# Patient Record
Sex: FEMALE | Race: WHITE | NOT HISPANIC OR LATINO | URBAN - NONMETROPOLITAN AREA
[De-identification: names, ages, dates, MRNs, and addresses within clinical notes are randomized per-mention and may not be internally consistent; named-entity substitution may affect disease eponyms.]

---

## 2017-10-09 ENCOUNTER — HOSPITAL ENCOUNTER (OUTPATIENT)
Dept: RADIOLOGY | Facility: HOSPITAL | Age: 62
Discharge: HOME | End: 2017-10-09
Payer: COMMERCIAL

## 2017-10-09 ENCOUNTER — TRANSCRIBE ORDERS (OUTPATIENT)
Dept: ADMINISTRATIVE | Facility: HOSPITAL | Age: 62
End: 2017-10-09

## 2017-10-09 DIAGNOSIS — M19.90 OSTEOARTHRITIS, UNSPECIFIED OSTEOARTHRITIS TYPE, UNSPECIFIED SITE: Primary | ICD-10-CM

## 2017-10-09 DIAGNOSIS — M19.90 OSTEOARTHRITIS, UNSPECIFIED OSTEOARTHRITIS TYPE, UNSPECIFIED SITE: ICD-10-CM

## 2017-10-09 PROCEDURE — 73630 X-RAY EXAM OF FOOT: CPT

## 2017-10-09 PROCEDURE — 73610 X-RAY EXAM OF ANKLE: CPT

## 2017-10-09 PROCEDURE — 73502 X-RAY EXAM HIP UNI 2-3 VIEWS: CPT

## 2017-12-12 ENCOUNTER — HOSPITAL ENCOUNTER (OUTPATIENT)
Dept: MAMMOGRAPHY | Facility: HOSPITAL | Age: 62
Discharge: HOME | End: 2017-12-12
Payer: COMMERCIAL

## 2017-12-12 ENCOUNTER — GENERIC CONVERSION - ENCOUNTER (OUTPATIENT)
Dept: OTHER | Facility: OTHER | Age: 62
End: 2017-12-12

## 2017-12-12 DIAGNOSIS — Z12.31 OTHER SCREENING MAMMOGRAM: ICD-10-CM

## 2017-12-12 PROCEDURE — G0202 SCR MAMMO BI INCL CAD: HCPCS

## 2017-12-12 PROCEDURE — 77063 BREAST TOMOSYNTHESIS BI: CPT

## 2018-09-11 ENCOUNTER — HOSPITAL ENCOUNTER (OUTPATIENT)
Dept: RADIOLOGY | Facility: HOSPITAL | Age: 63
Discharge: HOME | End: 2018-09-11
Payer: COMMERCIAL

## 2018-09-11 ENCOUNTER — TRANSCRIBE ORDERS (OUTPATIENT)
Dept: ADMINISTRATIVE | Facility: HOSPITAL | Age: 63
End: 2018-09-11

## 2018-09-11 DIAGNOSIS — G89.11 ACUTE TRAUMATIC PAIN: Primary | ICD-10-CM

## 2018-09-11 DIAGNOSIS — G89.11 ACUTE TRAUMATIC PAIN: ICD-10-CM

## 2018-09-11 PROCEDURE — 73630 X-RAY EXAM OF FOOT: CPT

## 2018-11-19 ENCOUNTER — TRANSCRIBE ORDERS (OUTPATIENT)
Dept: ADMINISTRATIVE | Facility: HOSPITAL | Age: 63
End: 2018-11-19

## 2018-11-19 DIAGNOSIS — Z12.39 SCREENING BREAST EXAMINATION: Primary | ICD-10-CM

## 2018-12-13 ENCOUNTER — HOSPITAL ENCOUNTER (OUTPATIENT)
Dept: MAMMOGRAPHY | Facility: HOSPITAL | Age: 63
Discharge: HOME | End: 2018-12-13
Payer: COMMERCIAL

## 2018-12-13 VITALS — HEIGHT: 65 IN | WEIGHT: 280 LBS | BODY MASS INDEX: 46.65 KG/M2

## 2018-12-13 DIAGNOSIS — Z12.39 SCREENING BREAST EXAMINATION: ICD-10-CM

## 2018-12-13 PROCEDURE — 77063 BREAST TOMOSYNTHESIS BI: CPT

## 2018-12-13 PROCEDURE — 77067 SCR MAMMO BI INCL CAD: CPT

## 2019-11-21 ENCOUNTER — TRANSCRIBE ORDERS (OUTPATIENT)
Dept: ADMINISTRATIVE | Facility: HOSPITAL | Age: 64
End: 2019-11-21

## 2019-11-21 DIAGNOSIS — Z12.31 ENCOUNTER FOR SCREENING MAMMOGRAM FOR MALIGNANT NEOPLASM OF BREAST: Primary | ICD-10-CM

## 2019-12-23 ENCOUNTER — HOSPITAL ENCOUNTER (OUTPATIENT)
Dept: MAMMOGRAPHY | Facility: HOSPITAL | Age: 64
Discharge: HOME | End: 2019-12-23
Payer: COMMERCIAL

## 2019-12-23 VITALS — BODY MASS INDEX: 46.65 KG/M2 | HEIGHT: 65 IN | WEIGHT: 280 LBS

## 2019-12-23 DIAGNOSIS — Z12.31 ENCOUNTER FOR SCREENING MAMMOGRAM FOR MALIGNANT NEOPLASM OF BREAST: ICD-10-CM

## 2019-12-23 PROCEDURE — 77063 BREAST TOMOSYNTHESIS BI: CPT

## 2019-12-23 PROCEDURE — 77067 SCR MAMMO BI INCL CAD: CPT

## 2020-07-11 DIAGNOSIS — E55.9 VITAMIN D DEFICIENCY: Primary | ICD-10-CM

## 2020-07-13 RX ORDER — ERGOCALCIFEROL 1.25 MG/1
CAPSULE ORAL
Qty: 12 CAPSULE | Refills: 0 | Status: SHIPPED | OUTPATIENT
Start: 2020-07-13 | End: 2020-09-17 | Stop reason: SDUPTHER

## 2020-09-09 LAB — HBA1C MFR BLD HPLC: 7.8 %

## 2020-09-09 PROCEDURE — 3051F HG A1C>EQUAL 7.0%<8.0%: CPT | Performed by: FAMILY MEDICINE

## 2020-09-17 ENCOUNTER — OFFICE VISIT (OUTPATIENT)
Dept: FAMILY MEDICINE CLINIC | Facility: CLINIC | Age: 65
End: 2020-09-17
Payer: COMMERCIAL

## 2020-09-17 VITALS
HEART RATE: 68 BPM | RESPIRATION RATE: 20 BRPM | HEIGHT: 64 IN | BODY MASS INDEX: 44.22 KG/M2 | DIASTOLIC BLOOD PRESSURE: 84 MMHG | SYSTOLIC BLOOD PRESSURE: 136 MMHG | WEIGHT: 259 LBS

## 2020-09-17 DIAGNOSIS — E78.5 DYSLIPIDEMIA: ICD-10-CM

## 2020-09-17 DIAGNOSIS — E03.9 ACQUIRED HYPOTHYROIDISM: ICD-10-CM

## 2020-09-17 DIAGNOSIS — M19.90 ARTHRITIS: ICD-10-CM

## 2020-09-17 DIAGNOSIS — E66.9 DIABETES MELLITUS TYPE 2 IN OBESE (HCC): ICD-10-CM

## 2020-09-17 DIAGNOSIS — E55.9 VITAMIN D DEFICIENCY: ICD-10-CM

## 2020-09-17 DIAGNOSIS — I10 ESSENTIAL HYPERTENSION: Primary | ICD-10-CM

## 2020-09-17 DIAGNOSIS — E11.69 DIABETES MELLITUS TYPE 2 IN OBESE (HCC): ICD-10-CM

## 2020-09-17 PROCEDURE — 99214 OFFICE O/P EST MOD 30 MIN: CPT | Performed by: FAMILY MEDICINE

## 2020-09-17 PROCEDURE — 1036F TOBACCO NON-USER: CPT | Performed by: FAMILY MEDICINE

## 2020-09-17 PROCEDURE — 3725F SCREEN DEPRESSION PERFORMED: CPT | Performed by: FAMILY MEDICINE

## 2020-09-17 PROCEDURE — 3079F DIAST BP 80-89 MM HG: CPT | Performed by: FAMILY MEDICINE

## 2020-09-17 PROCEDURE — 3075F SYST BP GE 130 - 139MM HG: CPT | Performed by: FAMILY MEDICINE

## 2020-09-17 RX ORDER — EZETIMIBE 10 MG/1
10 TABLET ORAL DAILY
COMMUNITY
Start: 2020-08-01 | End: 2020-09-17 | Stop reason: SDUPTHER

## 2020-09-17 RX ORDER — HYDROCHLOROTHIAZIDE 25 MG/1
25 TABLET ORAL DAILY
Qty: 90 TABLET | Refills: 1 | Status: SHIPPED | OUTPATIENT
Start: 2020-09-17 | End: 2021-06-29 | Stop reason: SDUPTHER

## 2020-09-17 RX ORDER — HYDROCHLOROTHIAZIDE 25 MG/1
25 TABLET ORAL DAILY
COMMUNITY
Start: 2020-07-27 | End: 2020-09-17 | Stop reason: SDUPTHER

## 2020-09-17 RX ORDER — AMLODIPINE BESYLATE 5 MG/1
5 TABLET ORAL DAILY
COMMUNITY
Start: 2020-07-17 | End: 2020-09-17 | Stop reason: SDUPTHER

## 2020-09-17 RX ORDER — LEVOTHYROXINE SODIUM 0.12 MG/1
125 TABLET ORAL DAILY
Qty: 90 TABLET | Refills: 1 | Status: SHIPPED | OUTPATIENT
Start: 2020-09-17 | End: 2021-03-29 | Stop reason: SDUPTHER

## 2020-09-17 RX ORDER — LEVOTHYROXINE SODIUM 0.12 MG/1
125 TABLET ORAL DAILY
COMMUNITY
Start: 2020-08-23 | End: 2020-09-17 | Stop reason: SDUPTHER

## 2020-09-17 RX ORDER — CARVEDILOL 6.25 MG/1
6.25 TABLET ORAL 2 TIMES DAILY
Qty: 180 TABLET | Refills: 1 | Status: SHIPPED | OUTPATIENT
Start: 2020-09-17 | End: 2021-03-29 | Stop reason: SDUPTHER

## 2020-09-17 RX ORDER — AMLODIPINE BESYLATE 5 MG/1
5 TABLET ORAL DAILY
Qty: 90 TABLET | Refills: 1 | Status: SHIPPED | OUTPATIENT
Start: 2020-09-17 | End: 2021-03-19

## 2020-09-17 RX ORDER — CARVEDILOL 6.25 MG/1
6.25 TABLET ORAL 2 TIMES DAILY
COMMUNITY
Start: 2020-08-01 | End: 2020-09-17 | Stop reason: SDUPTHER

## 2020-09-17 RX ORDER — EZETIMIBE 10 MG/1
10 TABLET ORAL DAILY
Qty: 90 TABLET | Refills: 1 | Status: SHIPPED | OUTPATIENT
Start: 2020-09-17 | End: 2021-03-29 | Stop reason: SDUPTHER

## 2020-09-17 RX ORDER — ERGOCALCIFEROL 1.25 MG/1
50000 CAPSULE ORAL WEEKLY
Qty: 12 CAPSULE | Refills: 2 | Status: SHIPPED | OUTPATIENT
Start: 2020-09-17

## 2020-09-17 NOTE — PROGRESS NOTES
Assessment/Plan:  Diabetes mellitus type 2 with fair control continue current therapy  Left carotid bruit plan carotid Doppler  Morbid obesity with hypertension and diabetes discussed diet and exercise  Hypothyroidism euthyroid on replacement    Problem List Items Addressed This Visit     None      Visit Diagnoses     Essential hypertension    -  Primary    Relevant Medications    amLODIPine (NORVASC) 5 mg tablet    carvedilol (COREG) 6 25 mg tablet    hydrochlorothiazide (HYDRODIURIL) 25 mg tablet    Other Relevant Orders    ECG with rhythm strip    CBC and differential    Comprehensive metabolic panel    Vitamin D deficiency        Relevant Medications    ergocalciferol (VITAMIN D2) 50,000 units    Dyslipidemia        Relevant Medications    ezetimibe (ZETIA) 10 mg tablet    Other Relevant Orders    Lipid Panel with Direct LDL reflex    Acquired hypothyroidism        Relevant Medications    carvedilol (COREG) 6 25 mg tablet    levothyroxine 125 mcg tablet    Diabetes mellitus type 2 in obese (HCC)        Relevant Medications    metFORMIN (GLUCOPHAGE) 500 mg tablet    Other Relevant Orders    Microalbumin / creatinine urine ratio    VAS carotid complete study    Arthritis        Relevant Orders    C-reactive protein           Diagnoses and all orders for this visit:    Essential hypertension  -     amLODIPine (NORVASC) 5 mg tablet; Take 1 tablet (5 mg total) by mouth daily  -     carvedilol (COREG) 6 25 mg tablet; Take 1 tablet (6 25 mg total) by mouth 2 (two) times a day  -     hydrochlorothiazide (HYDRODIURIL) 25 mg tablet; Take 1 tablet (25 mg total) by mouth daily  -     ECG with rhythm strip; Future  -     CBC and differential; Future  -     Comprehensive metabolic panel; Future    Vitamin D deficiency  -     ergocalciferol (VITAMIN D2) 50,000 units; Take 1 capsule (50,000 Units total) by mouth once a week    Dyslipidemia  -     ezetimibe (ZETIA) 10 mg tablet;  Take 1 tablet (10 mg total) by mouth daily  -     Lipid Panel with Direct LDL reflex; Future    Acquired hypothyroidism  -     levothyroxine 125 mcg tablet; Take 1 tablet (125 mcg total) by mouth daily    Diabetes mellitus type 2 in obese (HCC)  -     metFORMIN (GLUCOPHAGE) 500 mg tablet; Take 1 tablet (500 mg total) by mouth 2 (two) times a day  -     Microalbumin / creatinine urine ratio  -     VAS carotid complete study; Future    Arthritis  -     C-reactive protein; Future    Other orders  -     Discontinue: amLODIPine (NORVASC) 5 mg tablet; Take 5 mg by mouth daily  -     Discontinue: carvedilol (COREG) 6 25 mg tablet; Take 6 25 mg by mouth 2 (two) times a day  -     Discontinue: ezetimibe (ZETIA) 10 mg tablet; Take 10 mg by mouth daily  -     Discontinue: hydrochlorothiazide (HYDRODIURIL) 25 mg tablet; Take 25 mg by mouth daily  -     Discontinue: levothyroxine 125 mcg tablet; Take 125 mcg by mouth daily  -     Discontinue: metFORMIN (GLUCOPHAGE) 500 mg tablet; Take 500 mg by mouth 2 (two) times a day        No problem-specific Assessment & Plan notes found for this encounter  PHQ-9 Depression Screening    PHQ-9:    Frequency of the following problems over the past two weeks:       Little interest or pleasure in doing things:  0 - not at all  Feeling down, depressed, or hopeless:  0 - not at all  PHQ-2 Score:  0          Body mass index is 44 46 kg/m²  BMI Counseling: Body mass index is 44 46 kg/m²  The BMI is above normal  Nutrition recommendations include reducing portion sizes, decreasing overall calorie intake, 3-5 servings of fruits/vegetables daily, reducing fast food intake, consuming healthier snacks, decreasing soda and/or juice intake, moderation in carbohydrate intake, increasing intake of lean protein, reducing intake of saturated fat and trans fat and reducing intake of cholesterol  Subjective:      Patient ID: Sinan Ceja is a 60 y o  female      Presents for routine checkup      The following portions of the patient's history were reviewed and updated as appropriate:   She has no past medical history on file  ,  does not have a problem list on file  ,   has a past surgical history that includes Breast excisional biopsy (Left, 2001)  ,  family history includes Breast cancer in her maternal aunt, maternal aunt, and mother; Cancer in her paternal aunt, paternal aunt, paternal aunt, paternal grandfather, and paternal grandmother; Lung cancer in her paternal aunt; No Known Problems in her daughter, sister, sister, sister, sister, and sister  ,   reports that she has never smoked  She has never used smokeless tobacco  She reports previous alcohol use  She reports that she does not use drugs  ,  is allergic to aspirin and nsaids     Current Outpatient Medications   Medication Sig Dispense Refill    amLODIPine (NORVASC) 5 mg tablet Take 1 tablet (5 mg total) by mouth daily 90 tablet 1    carvedilol (COREG) 6 25 mg tablet Take 1 tablet (6 25 mg total) by mouth 2 (two) times a day 180 tablet 1    ergocalciferol (VITAMIN D2) 50,000 units Take 1 capsule (50,000 Units total) by mouth once a week 12 capsule 2    ezetimibe (ZETIA) 10 mg tablet Take 1 tablet (10 mg total) by mouth daily 90 tablet 1    hydrochlorothiazide (HYDRODIURIL) 25 mg tablet Take 1 tablet (25 mg total) by mouth daily 90 tablet 1    levothyroxine 125 mcg tablet Take 1 tablet (125 mcg total) by mouth daily 90 tablet 1    metFORMIN (GLUCOPHAGE) 500 mg tablet Take 1 tablet (500 mg total) by mouth 2 (two) times a day 180 tablet 1     No current facility-administered medications for this visit  Review of Systems   Constitutional: Negative  HENT: Negative  Eyes: Negative  Respiratory: Negative  Cardiovascular: Negative  Gastrointestinal: Negative  Endocrine: Negative  Genitourinary: Negative  Musculoskeletal: Positive for arthralgias  Skin: Negative  Allergic/Immunologic: Negative  Neurological: Negative      Hematological: Negative  Psychiatric/Behavioral: Negative  Objective:    /84   Pulse 68   Resp 20   Ht 5' 4" (1 626 m)   Wt 117 kg (259 lb)   BMI 44 46 kg/m²   Body mass index is 44 46 kg/m²  Physical Exam  Constitutional:       Appearance: She is well-developed  She is obese  HENT:      Head: Normocephalic  Eyes:      Pupils: Pupils are equal, round, and reactive to light  Neck:      Musculoskeletal: Normal range of motion  Vascular: Carotid bruit present  Comments: Left carotid bruit  Cardiovascular:      Rate and Rhythm: Normal rate and regular rhythm  Heart sounds: Normal heart sounds  Pulmonary:      Effort: Pulmonary effort is normal       Breath sounds: Normal breath sounds  Abdominal:      General: Bowel sounds are normal       Palpations: Abdomen is soft  Tenderness: There is no abdominal tenderness  Skin:     General: Skin is warm  Neurological:      Mental Status: She is alert and oriented to person, place, and time

## 2020-12-01 ENCOUNTER — TRANSCRIBE ORDERS (OUTPATIENT)
Dept: ADMINISTRATIVE | Facility: HOSPITAL | Age: 65
End: 2020-12-01

## 2020-12-01 DIAGNOSIS — Z12.31 ENCOUNTER FOR SCREENING MAMMOGRAM FOR MALIGNANT NEOPLASM OF BREAST: Primary | ICD-10-CM

## 2020-12-02 ENCOUNTER — HOSPITAL ENCOUNTER (OUTPATIENT)
Dept: NON INVASIVE DIAGNOSTICS | Facility: HOSPITAL | Age: 65
Discharge: HOME | End: 2020-12-02
Attending: FAMILY MEDICINE
Payer: COMMERCIAL

## 2020-12-02 ENCOUNTER — OFFICE VISIT (OUTPATIENT)
Dept: LAB | Facility: HOSPITAL | Age: 65
End: 2020-12-02
Attending: FAMILY MEDICINE
Payer: COMMERCIAL

## 2020-12-02 DIAGNOSIS — E66.9 DIABETES MELLITUS TYPE 2 IN OBESE (HCC): ICD-10-CM

## 2020-12-02 DIAGNOSIS — I10 ESSENTIAL HYPERTENSION: ICD-10-CM

## 2020-12-02 DIAGNOSIS — E11.69 DIABETES MELLITUS TYPE 2 IN OBESE (HCC): ICD-10-CM

## 2020-12-02 LAB
ATRIAL RATE: 62 BPM
P AXIS: 52 DEGREES
PR INTERVAL: 172 MS
QRS AXIS: -27 DEGREES
QRSD INTERVAL: 96 MS
QT INTERVAL: 438 MS
QTC INTERVAL: 444 MS
T WAVE AXIS: 4 DEGREES
VENTRICULAR RATE: 62 BPM

## 2020-12-02 PROCEDURE — 93880 EXTRACRANIAL BILAT STUDY: CPT | Performed by: SURGERY

## 2020-12-02 PROCEDURE — 93880 EXTRACRANIAL BILAT STUDY: CPT

## 2020-12-24 ENCOUNTER — HOSPITAL ENCOUNTER (OUTPATIENT)
Dept: MAMMOGRAPHY | Facility: HOSPITAL | Age: 65
Discharge: HOME | End: 2020-12-24
Payer: COMMERCIAL

## 2020-12-24 VITALS — WEIGHT: 259 LBS | BODY MASS INDEX: 44.22 KG/M2 | HEIGHT: 64 IN

## 2020-12-24 DIAGNOSIS — Z12.31 ENCOUNTER FOR SCREENING MAMMOGRAM FOR MALIGNANT NEOPLASM OF BREAST: ICD-10-CM

## 2020-12-24 PROCEDURE — 77063 BREAST TOMOSYNTHESIS BI: CPT

## 2020-12-24 PROCEDURE — 77067 SCR MAMMO BI INCL CAD: CPT

## 2020-12-28 ENCOUNTER — OFFICE VISIT (OUTPATIENT)
Dept: FAMILY MEDICINE CLINIC | Facility: CLINIC | Age: 65
End: 2020-12-28
Payer: COMMERCIAL

## 2020-12-28 VITALS
DIASTOLIC BLOOD PRESSURE: 68 MMHG | HEIGHT: 65 IN | TEMPERATURE: 97.2 F | SYSTOLIC BLOOD PRESSURE: 122 MMHG | RESPIRATION RATE: 20 BRPM | HEART RATE: 84 BPM | BODY MASS INDEX: 43.77 KG/M2

## 2020-12-28 DIAGNOSIS — I10 ESSENTIAL HYPERTENSION: ICD-10-CM

## 2020-12-28 DIAGNOSIS — E11.69 DIABETES MELLITUS TYPE 2 IN OBESE (HCC): Primary | ICD-10-CM

## 2020-12-28 DIAGNOSIS — E66.01 CLASS 3 SEVERE OBESITY DUE TO EXCESS CALORIES WITH SERIOUS COMORBIDITY AND BODY MASS INDEX (BMI) OF 40.0 TO 44.9 IN ADULT (HCC): ICD-10-CM

## 2020-12-28 DIAGNOSIS — E55.9 VITAMIN D DEFICIENCY: ICD-10-CM

## 2020-12-28 DIAGNOSIS — E66.9 DIABETES MELLITUS TYPE 2 IN OBESE (HCC): Primary | ICD-10-CM

## 2020-12-28 DIAGNOSIS — E78.5 DYSLIPIDEMIA: ICD-10-CM

## 2020-12-28 DIAGNOSIS — E03.9 ACQUIRED HYPOTHYROIDISM: ICD-10-CM

## 2020-12-28 PROCEDURE — 3074F SYST BP LT 130 MM HG: CPT | Performed by: FAMILY MEDICINE

## 2020-12-28 PROCEDURE — 99214 OFFICE O/P EST MOD 30 MIN: CPT | Performed by: FAMILY MEDICINE

## 2020-12-28 PROCEDURE — 1036F TOBACCO NON-USER: CPT | Performed by: FAMILY MEDICINE

## 2020-12-28 PROCEDURE — 3078F DIAST BP <80 MM HG: CPT | Performed by: FAMILY MEDICINE

## 2020-12-29 ENCOUNTER — TELEPHONE (OUTPATIENT)
Dept: ADMINISTRATIVE | Facility: OTHER | Age: 65
End: 2020-12-29

## 2021-03-01 ENCOUNTER — TELEPHONE (OUTPATIENT)
Dept: FAMILY MEDICINE CLINIC | Facility: CLINIC | Age: 66
End: 2021-03-01

## 2021-03-01 NOTE — TELEPHONE ENCOUNTER
Patient was told from pharmacy that she can get her covid vaccine 2 weeks after receiving her shingles vaccine - she is trying to schedule with [facility] for covid vaccine and they wanted the ok from her PCP - advised if pharmacist said ok we agree with them

## 2021-03-10 DIAGNOSIS — Z23 ENCOUNTER FOR IMMUNIZATION: ICD-10-CM

## 2021-03-10 LAB
CREAT ?TM UR-SCNC: 90 UMOL/L
HBA1C MFR BLD HPLC: 7.9 %

## 2021-03-10 PROCEDURE — 3051F HG A1C>EQUAL 7.0%<8.0%: CPT | Performed by: FAMILY MEDICINE

## 2021-03-19 DIAGNOSIS — I10 ESSENTIAL HYPERTENSION: ICD-10-CM

## 2021-03-19 RX ORDER — AMLODIPINE BESYLATE 5 MG/1
TABLET ORAL
Qty: 90 TABLET | Refills: 1 | Status: SHIPPED | OUTPATIENT
Start: 2021-03-19 | End: 2021-09-20

## 2021-03-29 ENCOUNTER — OFFICE VISIT (OUTPATIENT)
Dept: FAMILY MEDICINE CLINIC | Facility: CLINIC | Age: 66
End: 2021-03-29
Payer: COMMERCIAL

## 2021-03-29 VITALS
RESPIRATION RATE: 20 BRPM | HEART RATE: 84 BPM | DIASTOLIC BLOOD PRESSURE: 74 MMHG | SYSTOLIC BLOOD PRESSURE: 118 MMHG | TEMPERATURE: 97.9 F

## 2021-03-29 DIAGNOSIS — E03.9 ACQUIRED HYPOTHYROIDISM: ICD-10-CM

## 2021-03-29 DIAGNOSIS — L50.8 CHRONIC URTICARIA: ICD-10-CM

## 2021-03-29 DIAGNOSIS — I10 ESSENTIAL HYPERTENSION: ICD-10-CM

## 2021-03-29 DIAGNOSIS — E78.5 DYSLIPIDEMIA: ICD-10-CM

## 2021-03-29 DIAGNOSIS — E11.69 DIABETES MELLITUS TYPE 2 IN OBESE (HCC): Primary | ICD-10-CM

## 2021-03-29 DIAGNOSIS — E66.01 CLASS 3 SEVERE OBESITY DUE TO EXCESS CALORIES WITH SERIOUS COMORBIDITY AND BODY MASS INDEX (BMI) OF 40.0 TO 44.9 IN ADULT (HCC): ICD-10-CM

## 2021-03-29 DIAGNOSIS — E66.9 DIABETES MELLITUS TYPE 2 IN OBESE (HCC): Primary | ICD-10-CM

## 2021-03-29 PROCEDURE — 3074F SYST BP LT 130 MM HG: CPT | Performed by: FAMILY MEDICINE

## 2021-03-29 PROCEDURE — 1036F TOBACCO NON-USER: CPT | Performed by: FAMILY MEDICINE

## 2021-03-29 PROCEDURE — 99214 OFFICE O/P EST MOD 30 MIN: CPT | Performed by: FAMILY MEDICINE

## 2021-03-29 PROCEDURE — 3078F DIAST BP <80 MM HG: CPT | Performed by: FAMILY MEDICINE

## 2021-03-29 RX ORDER — EZETIMIBE 10 MG/1
10 TABLET ORAL DAILY
Qty: 90 TABLET | Refills: 1 | Status: SHIPPED | OUTPATIENT
Start: 2021-03-29 | End: 2021-09-30 | Stop reason: SDUPTHER

## 2021-03-29 RX ORDER — CARVEDILOL 6.25 MG/1
6.25 TABLET ORAL 2 TIMES DAILY
Qty: 180 TABLET | Refills: 1 | Status: SHIPPED | OUTPATIENT
Start: 2021-03-29

## 2021-03-29 RX ORDER — LEVOTHYROXINE SODIUM 0.12 MG/1
125 TABLET ORAL DAILY
Qty: 90 TABLET | Refills: 1 | Status: SHIPPED | OUTPATIENT
Start: 2021-03-29 | End: 2021-09-30 | Stop reason: SDUPTHER

## 2021-05-25 DIAGNOSIS — E55.9 VITAMIN D DEFICIENCY: ICD-10-CM

## 2021-05-25 RX ORDER — ERGOCALCIFEROL 1.25 MG/1
CAPSULE ORAL
Qty: 12 CAPSULE | Refills: 2 | OUTPATIENT
Start: 2021-05-25

## 2021-06-08 DIAGNOSIS — E11.69 DIABETES MELLITUS TYPE 2 IN OBESE (HCC): ICD-10-CM

## 2021-06-08 DIAGNOSIS — E66.9 DIABETES MELLITUS TYPE 2 IN OBESE (HCC): ICD-10-CM

## 2021-06-22 ENCOUNTER — RA CDI HCC (OUTPATIENT)
Dept: OTHER | Facility: HOSPITAL | Age: 66
End: 2021-06-22

## 2021-06-22 NOTE — PROGRESS NOTES
[facility]  coding opportunities          Chart reviewed, no opportunity found: CHART REVIEWED, NO OPPORTUNITY FOUND                     Patients insurance company:  [employer] [facility] (Medicare Advantage and Commercial)

## 2021-06-29 ENCOUNTER — OFFICE VISIT (OUTPATIENT)
Dept: FAMILY MEDICINE CLINIC | Facility: CLINIC | Age: 66
End: 2021-06-29
Payer: COMMERCIAL

## 2021-06-29 ENCOUNTER — TELEPHONE (OUTPATIENT)
Dept: FAMILY MEDICINE CLINIC | Facility: CLINIC | Age: 66
End: 2021-06-29

## 2021-06-29 VITALS
DIASTOLIC BLOOD PRESSURE: 68 MMHG | HEART RATE: 84 BPM | TEMPERATURE: 97.3 F | SYSTOLIC BLOOD PRESSURE: 134 MMHG | RESPIRATION RATE: 20 BRPM

## 2021-06-29 DIAGNOSIS — Z00.00 ANNUAL PHYSICAL EXAM: Primary | ICD-10-CM

## 2021-06-29 DIAGNOSIS — I10 ESSENTIAL HYPERTENSION: ICD-10-CM

## 2021-06-29 DIAGNOSIS — E55.9 VITAMIN D DEFICIENCY: ICD-10-CM

## 2021-06-29 DIAGNOSIS — E78.5 DYSLIPIDEMIA: ICD-10-CM

## 2021-06-29 DIAGNOSIS — L50.8 CHRONIC URTICARIA: ICD-10-CM

## 2021-06-29 DIAGNOSIS — R07.9 CHEST PAIN, UNSPECIFIED TYPE: ICD-10-CM

## 2021-06-29 DIAGNOSIS — E11.69 DIABETES MELLITUS TYPE 2 IN OBESE (HCC): ICD-10-CM

## 2021-06-29 DIAGNOSIS — E03.9 ACQUIRED HYPOTHYROIDISM: ICD-10-CM

## 2021-06-29 DIAGNOSIS — E66.01 CLASS 3 SEVERE OBESITY DUE TO EXCESS CALORIES WITH SERIOUS COMORBIDITY AND BODY MASS INDEX (BMI) OF 40.0 TO 44.9 IN ADULT (HCC): ICD-10-CM

## 2021-06-29 DIAGNOSIS — E66.9 DIABETES MELLITUS TYPE 2 IN OBESE (HCC): ICD-10-CM

## 2021-06-29 PROCEDURE — 99214 OFFICE O/P EST MOD 30 MIN: CPT | Performed by: FAMILY MEDICINE

## 2021-06-29 PROCEDURE — 3075F SYST BP GE 130 - 139MM HG: CPT | Performed by: FAMILY MEDICINE

## 2021-06-29 PROCEDURE — 1036F TOBACCO NON-USER: CPT | Performed by: FAMILY MEDICINE

## 2021-06-29 PROCEDURE — 99396 PREV VISIT EST AGE 40-64: CPT | Performed by: FAMILY MEDICINE

## 2021-06-29 PROCEDURE — 3078F DIAST BP <80 MM HG: CPT | Performed by: FAMILY MEDICINE

## 2021-06-29 RX ORDER — HYDROCHLOROTHIAZIDE 25 MG/1
25 TABLET ORAL DAILY
Qty: 90 TABLET | Refills: 1 | Status: SHIPPED | OUTPATIENT
Start: 2021-06-29 | End: 2021-09-30 | Stop reason: SDUPTHER

## 2021-06-29 NOTE — PATIENT INSTRUCTIONS

## 2021-06-29 NOTE — PROGRESS NOTES
Diabetic Foot Exam    Patient's shoes and socks removed  Right Foot/Ankle   Right Foot Inspection  Skin Exam: skin normal and skin intact no dry skin, no warmth, no callus, no erythema, no maceration, no abnormal color, no pre-ulcer, no ulcer and no callus                          Toe Exam: ROM and strength within normal limits  Sensory   Vibration: intact  Proprioception: intact   Monofilament testing: intact  Vascular  Capillary refills: < 3 seconds  The right DP pulse is 2+  The right PT pulse is 2+  Right Toe  - Comprehensive Exam  Ecchymosis: none  Arch: normal  Hammertoes: absent  Claw Toes: absent  Swelling: none   Tenderness: none         Left Foot/Ankle  Left Foot Inspection  Skin Exam: skin normal and skin intactno dry skin, no warmth, no erythema, no maceration, normal color, no pre-ulcer, no ulcer and no callus                         Toe Exam: ROM and strength within normal limits                   Sensory   Vibration: intact  Proprioception: intact  Monofilament: intact  Vascular  Capillary refills: < 3 seconds  The left DP pulse is 2+  The left PT pulse is 2+  Left Toe  - Comprehensive Exam  Ecchymosis: none  Arch: normal  Hammertoes: absent  Claw toes: absent  Swelling: none   Tenderness: none       Assign Risk Category:  No deformity present; No loss of protective sensation;  No weak pulses       Risk: 0

## 2021-06-29 NOTE — PROGRESS NOTES
Assessment/Plan: diabetes mellitus type 2 with a hemoglobin A1c at 7 2 on current regimen    Chest pain in adult with diabetes plan is to perform a nuclear stress test    Hypothyroidism euthyroid on current regimen    Essential hypertension with blood pressure controlled on current regimen    Dyslipidemia with a desirable cholesterol panel on current therapy    Class 3 severe obesity due to excess calories with serious comorbidity diet discussed        Problem List Items Addressed This Visit     None      Visit Diagnoses     Diabetes mellitus type 2 in obese ([place] [address] )    -  Primary    Relevant Orders    CBC and differential    Comprehensive metabolic panel    Hemoglobin A1c (w/out EAG)    Essential hypertension        Relevant Medications    hydrochlorothiazide (HYDRODIURIL) 25 mg tablet    Acquired hypothyroidism        Dyslipidemia        Relevant Orders    Lipid Panel with Direct LDL reflex    Class 3 severe obesity due to excess calories with serious comorbidity and body mass index (BMI) of 40 0 to 44 9 in [facility])        Chronic urticaria        Chest pain, unspecified type        Relevant Orders    X-ray chest 2 views    NM myocardial perfusion spect (rx stress and/or rest)    Vitamin D deficiency        Relevant Orders    Vitamin D 25 hydroxy           Diagnoses and all orders for this visit:    Diabetes mellitus type 2 in obese ([place] [address] )  -     CBC and differential; Future  -     Comprehensive metabolic panel; Future  -     Hemoglobin A1c (w/out EAG); Future    Essential hypertension  -     hydrochlorothiazide (HYDRODIURIL) 25 mg tablet; Take 1 tablet (25 mg total) by mouth daily    Acquired hypothyroidism    Dyslipidemia  -     Lipid Panel with Direct LDL reflex; Future    Class 3 severe obesity due to excess calories with serious comorbidity and body mass index (BMI) of 40 0 to 44 9 in [facility])    Chronic urticaria    Chest pain, unspecified type  -     X-ray chest 2 views;  Future  -     NM myocardial perfusion spect (rx stress and/or rest); Future    Vitamin D deficiency  -     Vitamin D 25 hydroxy; Future    Other orders  -     Cyanocobalamin (VITAMIN B-12 PO); Take by mouth        No problem-specific Assessment & Plan notes found for this encounter  PHQ-9 Depression Screening    PHQ-9:   Frequency of the following problems over the past two weeks: There is no height or weight on file to calculate BMI  BMI Counseling: There is no height or weight on file to calculate BMI  The BMI is above normal  Nutrition recommendations include reducing portion sizes, decreasing overall calorie intake, 3-5 servings of fruits/vegetables daily, reducing fast food intake, consuming healthier snacks, decreasing soda and/or juice intake, moderation in carbohydrate intake, increasing intake of lean protein, reducing intake of saturated fat and trans fat and reducing intake of cholesterol  Subjective:      Patient ID: Nova Powers is a 63 y o  female  Patient presents for four-month checkup for diabetes mellitus type 2, hypertension, hyperlipidemia, hypothyroidism  The following portions of the patient's history were reviewed and updated as appropriate:   She has a past medical history of Hyperlipidemia, Hypertension, Hypothyroidism, Morbid obesity ([address] ), Pancreatitis (09/1997), Sleep apnea, Type 2 diabetes mellitus ([facility] [address] ), and Vitamin D deficiency  ,  does not have a problem list on file  ,   has a past surgical history that includes Breast excisional biopsy (Left, 2001); Gastric bypass (05/2013); Cholecystectomy (04/2013); Total knee arthroplasty (Left, 2013); Breast excisional biopsy (Left, 07/2001); and Mammo (historical) (Bilateral, 12/2016)  ,  family history includes Brain cancer in her mother; Breast cancer in her maternal aunt, maternal aunt, and mother; Cancer in her paternal aunt, paternal aunt, paternal aunt, paternal grandfather, and paternal grandmother; Heart attack in her father; Lung cancer in her mother and paternal aunt; No Known Problems in her daughter, sister, sister, sister, sister, and sister  ,   reports that she has never smoked  She has never used smokeless tobacco  She reports previous alcohol use  She reports that she does not use drugs  ,  is allergic to aspirin and nsaids     Current Outpatient Medications   Medication Sig Dispense Refill    Cyanocobalamin (VITAMIN B-12 PO) Take by mouth      amLODIPine (NORVASC) 5 mg tablet take 1 tablet by mouth daily 90 tablet 1    carvedilol (COREG) 6 25 mg tablet Take 1 tablet (6 25 mg total) by mouth 2 (two) times a day 180 tablet 1    ergocalciferol (VITAMIN D2) 50,000 units Take 1 capsule (50,000 Units total) by mouth once a week 12 capsule 2    ezetimibe (ZETIA) 10 mg tablet Take 1 tablet (10 mg total) by mouth daily 90 tablet 1    hydrochlorothiazide (HYDRODIURIL) 25 mg tablet Take 1 tablet (25 mg total) by mouth daily 90 tablet 1    levothyroxine 125 mcg tablet Take 1 tablet (125 mcg total) by mouth daily 90 tablet 1    metFORMIN (GLUCOPHAGE) 1000 MG tablet Take 1 tablet (1,000 mg total) by mouth 2 (two) times a day 180 tablet 4     No current facility-administered medications for this visit  Review of Systems   Constitutional: Negative for chills and fever  HENT: Negative for ear pain and sore throat  Eyes: Negative for pain and visual disturbance  Respiratory: Negative for cough and shortness of breath  Cardiovascular: Positive for chest pain  Negative for palpitations  Intermittent chest pain lasting approximately 5 minutes feels like  A elephant is sitting on her chest   Gastrointestinal: Negative for abdominal pain and vomiting  Genitourinary: Negative for dysuria and hematuria  Musculoskeletal: Negative for arthralgias and back pain  Skin: Negative for color change and rash  Neurological: Negative for seizures and syncope  All other systems reviewed and are negative  Objective:    /68   Pulse 84   Temp (!) 97 3 °F (36 3 °C)   Resp 20   There is no height or weight on file to calculate BMI  Physical Exam  Constitutional:       Appearance: She is well-developed  She is obese  HENT:      Head: Normocephalic  Eyes:      Pupils: Pupils are equal, round, and reactive to light  Cardiovascular:      Rate and Rhythm: Normal rate and regular rhythm  Heart sounds: Normal heart sounds  Pulmonary:      Effort: Pulmonary effort is normal       Breath sounds: Normal breath sounds  Abdominal:      General: Bowel sounds are normal       Palpations: Abdomen is soft  Tenderness: There is no abdominal tenderness  Musculoskeletal:      Cervical back: Normal range of motion  Skin:     General: Skin is warm  Neurological:      Mental Status: She is alert and oriented to person, place, and time

## 2021-06-29 NOTE — PROGRESS NOTES
[id number]    NAME: Carlos Santiago  AGE: 63 y o  SEX: female  : 1955     DATE: 2021     Assessment and Plan:Chest pain in adult diabetic will be arranging a nuclear stress test    Essential hypertension with blood pressure well controlled on current regimen    Acquired hypothyroidism euthyroid on current regimen    Class 3 severe obesity due to excess calories with serious comorbidity a BMI of 43 77 diet discussed    Chronic urticaria antihistamines offer some relief     Problem List Items Addressed This Visit     None      Visit Diagnoses     Diabetes mellitus type 2 in obese ([address] )    -  Primary    Relevant Orders    CBC and differential    Comprehensive metabolic panel    Hemoglobin A1c (w/out EAG)    Essential hypertension        Relevant Medications    hydrochlorothiazide (HYDRODIURIL) 25 mg tablet    Acquired hypothyroidism        Dyslipidemia        Relevant Orders    Lipid Panel with Direct LDL reflex    Class 3 severe obesity due to excess calories with serious comorbidity and body mass index (BMI) of 40 0 to 44 9 in adult [facility])        Chronic urticaria        Chest pain, unspecified type        Relevant Orders    X-ray chest 2 views    NM myocardial perfusion spect (rx stress and/or rest)    Vitamin D deficiency        Relevant Orders    Vitamin D 25 hydroxy          Immunizations and preventive care screenings were discussed with patient today  Appropriate education was printed on patient's after visit summary  Counseling:  · Alcohol/drug use: discussed moderation in alcohol intake, the recommendations for healthy alcohol use, and avoidance of illicit drug use  No follow-ups on file  Chief Complaint:     Chief Complaint   Patient presents with    Annual Exam    Follow-up     3 month check up    Chest Pain     random  Patient states lasts no longer than 5 minutes   "feels like she feels like an elephant is sitting on her chest"  Patient request Cardiac Work up    Diabetes    Results     labs from [address] [place] [address]        History of Present Illness:     Adult Annual Physical   Patient here for a comprehensive physical exam  The patient reports no problems  Diet and Physical Activity  · Diet/Nutrition: diabetic diet  · Exercise: no formal exercise  Depression Screening  PHQ-9 Depression Screening    PHQ-9:   Frequency of the following problems over the past two weeks:           General Health  · Sleep: sleeps well  · Hearing: normal - bilateral   · Vision: no vision problems  · Dental: regular dental visits  /GYN Health  · Patient is: postmenopausal  · Last menstrual period: 20years  · Contraceptive method: none  Review of Systems:     Review of Systems   Constitutional: Negative for chills and fever  HENT: Negative for ear pain and sore throat  Eyes: Negative for pain and visual disturbance  Respiratory: Positive for chest tightness  Negative for cough and shortness of breath  Wheezing:  chestPain and chest tightness lasting approximately 5 minutes feels like an elephant is sitting on her chest     Cardiovascular: Positive for chest pain  Negative for palpitations  Gastrointestinal: Negative for abdominal pain and vomiting  Genitourinary: Negative for dysuria and hematuria  Musculoskeletal: Negative for arthralgias and back pain  Skin: Negative for color change and rash  Neurological: Negative for seizures and syncope  All other systems reviewed and are negative       Past Medical History:     Past Medical History:   Diagnosis Date    Hyperlipidemia     Hypertension     Hypothyroidism     Morbid obesity ([facility] )     Pancreatitis 09/1997    Sleep apnea     [facility]     Vitamin D deficiency       Past Surgical History:     Past Surgical History:   Procedure Laterality Date    BREAST EXCISIONAL BIOPSY Left 2001    benign     BREAST EXCISIONAL BIOPSY Left 07/2001    CHOLECYSTECTOMY  04/2013    GASTRIC BYPASS  05/2013    MAMMO (HISTORICAL) Bilateral 12/2016    [facility] [employer]    TOTAL KNEE ARTHROPLASTY Left 2013      Social History:     Social History     Socioeconomic History    Marital status: /Civil Union     Spouse name: None    Number of children: None    Years of education: None    Highest education level: None   Occupational History    None   Tobacco Use    Smoking status: Never Smoker    Smokeless tobacco: Never Used   Substance and Sexual Activity    Alcohol use: Not Currently    Drug use: Never    Sexual activity: None   Other Topics Concern    None   Social History Narrative    None     Social Determinants of Health     Financial Resource Strain:     Difficulty of Paying Living Expenses:    Food Insecurity:     Worried About Running Out of Food in the Last Year:     Ran Out of Food in the Last Year:    Transportation Needs:     Lack of Transportation (Medical):      Lack of Transportation (Non-Medical):    Physical Activity:     Days of Exercise per Week:     Minutes of Exercise per Session:    Stress:     Feeling of Stress :    Social Connections:     Frequency of Communication with Friends and Family:     Frequency of Social Gatherings with Friends and Family:     Attends [employer] Services:     Active Member of Clubs or Organizations:     Attends Club or Organization Meetings:     Marital Status:    Intimate Partner Violence:     Fear of Current or Ex-Partner:     Emotionally Abused:     Physically Abused:     Sexually Abused:       Family History:     Family History   Problem Relation Age of Onset    Breast cancer Mother     Lung cancer Mother     Brain cancer Mother     Breast cancer Maternal Aunt     Breast cancer Maternal Aunt     Heart attack Father     No Known Problems Sister     No Known Problems Daughter     Cancer Paternal Grandmother         unknown origin     Cancer Paternal Grandfather         unknown origin     No Known Problems Sister     No Known Problems Sister     No Known Problems Sister     No Known Problems Sister     Cancer Paternal Aunt         unknown origin    Cancer Paternal Aunt         unknown origin    Cancer Paternal Aunt         unknown origin    Lung cancer Paternal Aunt       Current Medications:     Current Outpatient Medications   Medication Sig Dispense Refill    Cyanocobalamin (VITAMIN B-12 PO) Take by mouth      amLODIPine (NORVASC) 5 mg tablet take 1 tablet by mouth daily 90 tablet 1    carvedilol (COREG) 6 25 mg tablet Take 1 tablet (6 25 mg total) by mouth 2 (two) times a day 180 tablet 1    ergocalciferol (VITAMIN D2) 50,000 units Take 1 capsule (50,000 Units total) by mouth once a week 12 capsule 2    ezetimibe (ZETIA) 10 mg tablet Take 1 tablet (10 mg total) by mouth daily 90 tablet 1    hydrochlorothiazide (HYDRODIURIL) 25 mg tablet Take 1 tablet (25 mg total) by mouth daily 90 tablet 1    levothyroxine 125 mcg tablet Take 1 tablet (125 mcg total) by mouth daily 90 tablet 1    metFORMIN (GLUCOPHAGE) 1000 MG tablet Take 1 tablet (1,000 mg total) by mouth 2 (two) times a day 180 tablet 4     No current facility-administered medications for this visit  Allergies: Allergies   Allergen Reactions    Aspirin     Nsaids       Physical Exam:     /68   Pulse 84   Temp (!) 97 3 °F (36 3 °C)   Resp 20     Physical Exam  Vitals and nursing note reviewed  Constitutional:       General: She is not in acute distress  Appearance: She is well-developed  She is obese  HENT:      Head: Normocephalic and atraumatic  Eyes:      Conjunctiva/sclera: Conjunctivae normal    Cardiovascular:      Rate and Rhythm: Normal rate and regular rhythm  Heart sounds: No murmur heard  Pulmonary:      Effort: Pulmonary effort is normal  No respiratory distress  Breath sounds: Normal breath sounds     Abdominal:      Palpations: Abdomen is soft  Tenderness: There is no abdominal tenderness  Musculoskeletal:      Cervical back: Neck supple  Skin:     General: Skin is warm and dry  Neurological:      Mental Status: She is alert            DO ESTRELLA Solorzano [facility]

## 2021-07-14 ENCOUNTER — HOSPITAL ENCOUNTER (OUTPATIENT)
Dept: NUCLEAR MEDICINE | Facility: HOSPITAL | Age: 66
Discharge: HOME | End: 2021-07-14
Attending: FAMILY MEDICINE
Payer: COMMERCIAL

## 2021-07-14 ENCOUNTER — TELEPHONE (OUTPATIENT)
Dept: FAMILY MEDICINE CLINIC | Facility: CLINIC | Age: 66
End: 2021-07-14

## 2021-07-14 ENCOUNTER — HOSPITAL ENCOUNTER (OUTPATIENT)
Dept: RADIOLOGY | Facility: HOSPITAL | Age: 66
Discharge: HOME | End: 2021-07-14
Attending: FAMILY MEDICINE
Payer: COMMERCIAL

## 2021-07-14 DIAGNOSIS — R07.9 CHEST PAIN, UNSPECIFIED TYPE: ICD-10-CM

## 2021-07-14 PROCEDURE — 78452 HT MUSCLE IMAGE SPECT MULT: CPT | Performed by: INTERNAL MEDICINE

## 2021-07-14 PROCEDURE — 71046 X-RAY EXAM CHEST 2 VIEWS: CPT

## 2021-07-14 PROCEDURE — 93018 CV STRESS TEST I&R ONLY: CPT | Performed by: INTERNAL MEDICINE

## 2021-07-14 PROCEDURE — 78452 HT MUSCLE IMAGE SPECT MULT: CPT

## 2021-07-14 PROCEDURE — A9502 TC99M TETROFOSMIN: HCPCS

## 2021-07-14 PROCEDURE — 93017 CV STRESS TEST TRACING ONLY: CPT

## 2021-07-14 PROCEDURE — G1004 CDSM NDSC: HCPCS

## 2021-07-14 PROCEDURE — 93016 CV STRESS TEST SUPVJ ONLY: CPT | Performed by: INTERNAL MEDICINE

## 2021-07-14 RX ADMIN — REGADENOSON 0.4 MG: 0.08 INJECTION, SOLUTION INTRAVENOUS at 09:44

## 2021-07-21 ENCOUNTER — TELEPHONE (OUTPATIENT)
Dept: FAMILY MEDICINE CLINIC | Facility: CLINIC | Age: 66
End: 2021-07-21

## 2021-07-21 NOTE — TELEPHONE ENCOUNTER
Patient states she has a UTI, urgency and frequency, lower abdomen pain is requesting an antibiotic GEORGES Escobedo

## 2021-09-20 DIAGNOSIS — I10 ESSENTIAL HYPERTENSION: ICD-10-CM

## 2021-09-20 RX ORDER — AMLODIPINE BESYLATE 5 MG/1
TABLET ORAL
Qty: 90 TABLET | Refills: 1 | Status: SHIPPED | OUTPATIENT
Start: 2021-09-20 | End: 2022-03-16

## 2021-09-21 ENCOUNTER — RA CDI HCC (OUTPATIENT)
Dept: OTHER | Facility: HOSPITAL | Age: 66
End: 2021-09-21

## 2021-09-21 NOTE — PROGRESS NOTES
[facility]  coding opportunities       Chart reviewed, no opportunity found: CHART REVIEWED, NO OPPORTUNITY FOUND                        Patients insurance company:  [employer] [facility] (Medicare Advantage and Commercial)

## 2021-09-30 ENCOUNTER — OFFICE VISIT (OUTPATIENT)
Dept: FAMILY MEDICINE CLINIC | Facility: CLINIC | Age: 66
End: 2021-09-30
Payer: COMMERCIAL

## 2021-09-30 VITALS
RESPIRATION RATE: 20 BRPM | SYSTOLIC BLOOD PRESSURE: 118 MMHG | DIASTOLIC BLOOD PRESSURE: 68 MMHG | HEART RATE: 84 BPM | TEMPERATURE: 97.8 F

## 2021-09-30 DIAGNOSIS — E78.5 DYSLIPIDEMIA: ICD-10-CM

## 2021-09-30 DIAGNOSIS — B86 SCABIES: ICD-10-CM

## 2021-09-30 DIAGNOSIS — E11.69 DIABETES MELLITUS TYPE 2 IN OBESE (HCC): Primary | ICD-10-CM

## 2021-09-30 DIAGNOSIS — I10 ESSENTIAL HYPERTENSION: ICD-10-CM

## 2021-09-30 DIAGNOSIS — E03.9 ACQUIRED HYPOTHYROIDISM: ICD-10-CM

## 2021-09-30 DIAGNOSIS — E66.9 DIABETES MELLITUS TYPE 2 IN OBESE (HCC): Primary | ICD-10-CM

## 2021-09-30 PROCEDURE — 3074F SYST BP LT 130 MM HG: CPT | Performed by: FAMILY MEDICINE

## 2021-09-30 PROCEDURE — 3725F SCREEN DEPRESSION PERFORMED: CPT | Performed by: FAMILY MEDICINE

## 2021-09-30 PROCEDURE — 1036F TOBACCO NON-USER: CPT | Performed by: FAMILY MEDICINE

## 2021-09-30 PROCEDURE — 3078F DIAST BP <80 MM HG: CPT | Performed by: FAMILY MEDICINE

## 2021-09-30 PROCEDURE — 99214 OFFICE O/P EST MOD 30 MIN: CPT | Performed by: FAMILY MEDICINE

## 2021-09-30 RX ORDER — HYDROCHLOROTHIAZIDE 25 MG/1
25 TABLET ORAL DAILY
Qty: 90 TABLET | Refills: 1 | Status: SHIPPED | OUTPATIENT
Start: 2021-09-30 | End: 2022-03-31 | Stop reason: SDUPTHER

## 2021-09-30 RX ORDER — PERMETHRIN 50 MG/G
CREAM TOPICAL ONCE
Qty: 60 G | Refills: 1 | Status: SHIPPED | OUTPATIENT
Start: 2021-09-30 | End: 2021-09-30

## 2021-09-30 RX ORDER — LEVOTHYROXINE SODIUM 0.12 MG/1
125 TABLET ORAL DAILY
Qty: 90 TABLET | Refills: 1 | Status: SHIPPED | OUTPATIENT
Start: 2021-09-30 | End: 2022-03-31 | Stop reason: SDUPTHER

## 2021-09-30 RX ORDER — EZETIMIBE 10 MG/1
10 TABLET ORAL DAILY
Qty: 90 TABLET | Refills: 1 | Status: SHIPPED | OUTPATIENT
Start: 2021-09-30 | End: 2022-03-31 | Stop reason: SDUPTHER

## 2021-09-30 NOTE — PROGRESS NOTES
Assessment/Plan: diabetes mellitus type 2 with a hemoglobin A1c at 8 will plan diabetic Education along with adding Januvia 50 mg to the current regimen    Apparent rash of scabies between the toes will add permethrin 5% cream    Hypothyroidism euthyroid on current regimen    Hypertensive cardiovascular disease with blood pressure controlled on the current regimen    Dyslipidemia currently on Zetia to control cholesterol patient defers statin therapy    Problem List Items Addressed This Visit     None      Visit Diagnoses     Diabetes mellitus type 2 in obese ([facility] 75 )    -  Primary    Relevant Orders    Hemoglobin A1C    Comprehensive metabolic panel    Dyslipidemia        Essential hypertension        Acquired hypothyroidism               Diagnoses and all orders for this visit:    Diabetes mellitus type 2 in obese ([facility] )  -     Hemoglobin A1C; Future  -     Comprehensive metabolic panel; Future    Dyslipidemia  -     ezetimibe (ZETIA) 10 mg tablet; Take 1 tablet (10 mg total) by mouth daily    Essential hypertension  -     hydrochlorothiazide (HYDRODIURIL) 25 mg tablet; Take 1 tablet (25 mg total) by mouth daily    Acquired hypothyroidism  -     levothyroxine 125 mcg tablet; Take 1 tablet (125 mcg total) by mouth daily        No problem-specific Assessment & Plan notes found for this encounter  PHQ-9 Depression Screening    PHQ-9:   Frequency of the following problems over the past two weeks:      Little interest or pleasure in doing things: 0 - not at all  Feeling down, depressed, or hopeless: 0 - not at all  PHQ-2 Score: 0          There is no height or weight on file to calculate BMI  BMI Counseling: There is no height or weight on file to calculate BMI   The BMI is above normal  Nutrition recommendations include reducing portion sizes, decreasing overall calorie intake, 3-5 servings of fruits/vegetables daily, reducing fast food intake, consuming healthier snacks, decreasing soda and/or juice intake, moderation in carbohydrate intake, increasing intake of lean protein, reducing intake of saturated fat and trans fat and reducing intake of cholesterol  Subjective:      Patient ID: Maria Esther Sesay is a 63 y o  female  Patient presents for 3 month checkup on diabetes mellitus states that her sugar has been running high even though she has lost weight and is watching her diet      The following portions of the patient's history were reviewed and updated as appropriate:   She has a past medical history of Hyperlipidemia, Hypertension, Hypothyroidism, Morbid obesity ([address] ), Pancreatitis (09/1997), Sleep apnea, Type 2 diabetes mellitus ([facility] 75 ), and Vitamin D deficiency  ,  does not have a problem list on file  ,   has a past surgical history that includes Breast excisional biopsy (Left, 2001); Gastric bypass (05/2013); Cholecystectomy (04/2013); Total knee arthroplasty (Left, 2013); Breast excisional biopsy (Left, 07/2001); and Mammo (historical) (Bilateral, 12/2016)  ,  family history includes Brain cancer in her mother; Breast cancer in her maternal aunt, maternal aunt, and mother; Cancer in her paternal aunt, paternal aunt, paternal aunt, paternal grandfather, and paternal grandmother; Heart attack in her father; Lung cancer in her mother and paternal aunt; No Known Problems in her daughter, sister, sister, sister, sister, and sister  ,   reports that she has never smoked  She has never used smokeless tobacco  She reports previous alcohol use  She reports that she does not use drugs  ,  is allergic to aspirin and nsaids     Current Outpatient Medications   Medication Sig Dispense Refill    amLODIPine (NORVASC) 5 mg tablet take 1 tablet by mouth daily 90 tablet 1    carvedilol (COREG) 6 25 mg tablet Take 1 tablet (6 25 mg total) by mouth 2 (two) times a day 180 tablet 1    Cyanocobalamin (VITAMIN B-12 PO) Take by mouth      ergocalciferol (VITAMIN D2) 50,000 units Take 1 capsule (50,000 Units total) by mouth once a week 12 capsule 2    ezetimibe (ZETIA) 10 mg tablet Take 1 tablet (10 mg total) by mouth daily 90 tablet 1    hydrochlorothiazide (HYDRODIURIL) 25 mg tablet Take 1 tablet (25 mg total) by mouth daily 90 tablet 1    levothyroxine 125 mcg tablet Take 1 tablet (125 mcg total) by mouth daily 90 tablet 1    metFORMIN (GLUCOPHAGE) 1000 MG tablet Take 1 tablet (1,000 mg total) by mouth 2 (two) times a day 180 tablet 4     No current facility-administered medications for this visit  Review of Systems   Constitutional: Negative for chills and fever  HENT: Negative for ear pain and sore throat  Eyes: Negative for pain and visual disturbance  Respiratory: Negative for cough and shortness of breath  Cardiovascular: Negative for chest pain and palpitations  Gastrointestinal: Negative for abdominal pain and vomiting  Genitourinary: Negative for dysuria and hematuria  Musculoskeletal: Negative for arthralgias and back pain  Skin: Negative for color change and rash  Think she has scabies from her nephew states that she is itchy between her toes   Neurological: Negative for seizures and syncope  All other systems reviewed and are negative  Objective:    /68   Pulse 84   Temp 97 8 °F (36 6 °C)   Resp 20   There is no height or weight on file to calculate BMI  Physical Exam  Constitutional:       Appearance: She is well-developed  She is obese  HENT:      Head: Normocephalic  Eyes:      Pupils: Pupils are equal, round, and reactive to light  Cardiovascular:      Rate and Rhythm: Normal rate and regular rhythm  Heart sounds: Normal heart sounds  Pulmonary:      Effort: Pulmonary effort is normal       Breath sounds: Normal breath sounds  Abdominal:      General: Bowel sounds are normal       Palpations: Abdomen is soft  Tenderness: There is no abdominal tenderness  Musculoskeletal:      Cervical back: Normal range of motion     Skin:     General: Skin is warm       Findings: Rash present  Comments: Apparent rash of scabies between toes   Neurological:      Mental Status: She is alert and oriented to person, place, and time

## 2021-10-08 ENCOUNTER — TELEPHONE (OUTPATIENT)
Dept: FAMILY MEDICINE CLINIC | Facility: CLINIC | Age: 66
End: 2021-10-08

## 2021-10-26 ENCOUNTER — VBI (OUTPATIENT)
Dept: ADMINISTRATIVE | Facility: OTHER | Age: 66
End: 2021-10-26

## 2021-12-23 ENCOUNTER — RA CDI HCC (OUTPATIENT)
Dept: OTHER | Facility: HOSPITAL | Age: 66
End: 2021-12-23

## 2021-12-27 ENCOUNTER — HOSPITAL ENCOUNTER (OUTPATIENT)
Dept: MAMMOGRAPHY | Facility: HOSPITAL | Age: 66
Discharge: HOME | End: 2021-12-27
Payer: COMMERCIAL

## 2021-12-27 VITALS — BODY MASS INDEX: 42.98 KG/M2 | WEIGHT: 257.94 LBS | HEIGHT: 65 IN

## 2021-12-27 DIAGNOSIS — Z12.31 OTHER SCREENING MAMMOGRAM: ICD-10-CM

## 2021-12-27 PROCEDURE — 77063 BREAST TOMOSYNTHESIS BI: CPT

## 2021-12-27 PROCEDURE — 77067 SCR MAMMO BI INCL CAD: CPT

## 2021-12-28 PROBLEM — E66.01 MORBID OBESITY (HCC): Status: ACTIVE | Noted: 2021-12-28

## 2021-12-30 ENCOUNTER — OFFICE VISIT (OUTPATIENT)
Dept: FAMILY MEDICINE CLINIC | Facility: CLINIC | Age: 66
End: 2021-12-30
Payer: COMMERCIAL

## 2021-12-30 VITALS
RESPIRATION RATE: 20 BRPM | TEMPERATURE: 97.8 F | DIASTOLIC BLOOD PRESSURE: 68 MMHG | SYSTOLIC BLOOD PRESSURE: 134 MMHG | HEART RATE: 84 BPM

## 2021-12-30 DIAGNOSIS — E66.9 DIABETES MELLITUS TYPE 2 IN OBESE (HCC): Primary | ICD-10-CM

## 2021-12-30 DIAGNOSIS — E11.69 DIABETES MELLITUS TYPE 2 IN OBESE (HCC): Primary | ICD-10-CM

## 2021-12-30 DIAGNOSIS — E55.9 VITAMIN D DEFICIENCY: ICD-10-CM

## 2021-12-30 DIAGNOSIS — E66.01 CLASS 3 SEVERE OBESITY DUE TO EXCESS CALORIES WITH SERIOUS COMORBIDITY AND BODY MASS INDEX (BMI) OF 40.0 TO 44.9 IN ADULT (HCC): ICD-10-CM

## 2021-12-30 DIAGNOSIS — E03.9 ACQUIRED HYPOTHYROIDISM: ICD-10-CM

## 2021-12-30 DIAGNOSIS — I10 ESSENTIAL HYPERTENSION: ICD-10-CM

## 2021-12-30 PROCEDURE — 1036F TOBACCO NON-USER: CPT | Performed by: FAMILY MEDICINE

## 2021-12-30 PROCEDURE — 3075F SYST BP GE 130 - 139MM HG: CPT | Performed by: FAMILY MEDICINE

## 2021-12-30 PROCEDURE — 3078F DIAST BP <80 MM HG: CPT | Performed by: FAMILY MEDICINE

## 2021-12-30 PROCEDURE — 99214 OFFICE O/P EST MOD 30 MIN: CPT | Performed by: FAMILY MEDICINE

## 2022-01-21 ENCOUNTER — VBI (OUTPATIENT)
Dept: ADMINISTRATIVE | Facility: OTHER | Age: 67
End: 2022-01-21

## 2022-01-26 ENCOUNTER — VBI (OUTPATIENT)
Dept: ADMINISTRATIVE | Facility: OTHER | Age: 67
End: 2022-01-26

## 2022-03-07 ENCOUNTER — TELEPHONE (OUTPATIENT)
Dept: FAMILY MEDICINE CLINIC | Facility: CLINIC | Age: 67
End: 2022-03-07

## 2022-03-07 DIAGNOSIS — N30.90 CYSTITIS: Primary | ICD-10-CM

## 2022-03-07 RX ORDER — SULFAMETHOXAZOLE AND TRIMETHOPRIM 800; 160 MG/1; MG/1
1 TABLET ORAL 2 TIMES DAILY
Qty: 6 TABLET | Refills: 0 | Status: SHIPPED | OUTPATIENT
Start: 2022-03-07 | End: 2022-03-10

## 2022-03-07 NOTE — TELEPHONE ENCOUNTER
Patient requests medication for a UTI, she states she has frequency to go with little output, some pressure, little burning

## 2022-03-16 DIAGNOSIS — I10 ESSENTIAL HYPERTENSION: ICD-10-CM

## 2022-03-16 RX ORDER — AMLODIPINE BESYLATE 5 MG/1
TABLET ORAL
Qty: 90 TABLET | Refills: 1 | Status: SHIPPED | OUTPATIENT
Start: 2022-03-16 | End: 2022-07-14 | Stop reason: SDUPTHER

## 2022-03-24 ENCOUNTER — RA CDI HCC (OUTPATIENT)
Dept: OTHER | Facility: HOSPITAL | Age: 67
End: 2022-03-24

## 2022-03-24 NOTE — PROGRESS NOTES
[facility] 75  coding opportunities          Chart Reviewed number of suggestions sent to Provider: 1       E66 01 Morbid obesity ([facility] 75 )  *BMI:43 59 last assessed in 2021     If this is correct, please document and assess at your next visit,3/31/2022    Patients Insurance        Commercial Insurance: [employer]

## 2022-03-31 ENCOUNTER — OFFICE VISIT (OUTPATIENT)
Dept: FAMILY MEDICINE CLINIC | Facility: CLINIC | Age: 67
End: 2022-03-31
Payer: COMMERCIAL

## 2022-03-31 VITALS
RESPIRATION RATE: 20 BRPM | TEMPERATURE: 98.2 F | HEART RATE: 84 BPM | DIASTOLIC BLOOD PRESSURE: 74 MMHG | SYSTOLIC BLOOD PRESSURE: 126 MMHG

## 2022-03-31 DIAGNOSIS — Z53.20 SCREENING FOR HEPATITIS C DECLINED: ICD-10-CM

## 2022-03-31 DIAGNOSIS — E03.9 ACQUIRED HYPOTHYROIDISM: ICD-10-CM

## 2022-03-31 DIAGNOSIS — I10 ESSENTIAL HYPERTENSION: ICD-10-CM

## 2022-03-31 DIAGNOSIS — E66.9 DIABETES MELLITUS TYPE 2 IN OBESE (HCC): Primary | ICD-10-CM

## 2022-03-31 DIAGNOSIS — E11.69 DIABETES MELLITUS TYPE 2 IN OBESE (HCC): Primary | ICD-10-CM

## 2022-03-31 DIAGNOSIS — E78.5 DYSLIPIDEMIA: ICD-10-CM

## 2022-03-31 DIAGNOSIS — E66.01 CLASS 3 SEVERE OBESITY DUE TO EXCESS CALORIES WITH SERIOUS COMORBIDITY AND BODY MASS INDEX (BMI) OF 40.0 TO 44.9 IN ADULT (HCC): ICD-10-CM

## 2022-03-31 PROCEDURE — 99214 OFFICE O/P EST MOD 30 MIN: CPT | Performed by: FAMILY MEDICINE

## 2022-03-31 PROCEDURE — 3078F DIAST BP <80 MM HG: CPT | Performed by: FAMILY MEDICINE

## 2022-03-31 PROCEDURE — 3725F SCREEN DEPRESSION PERFORMED: CPT | Performed by: FAMILY MEDICINE

## 2022-03-31 PROCEDURE — 3288F FALL RISK ASSESSMENT DOCD: CPT | Performed by: FAMILY MEDICINE

## 2022-03-31 PROCEDURE — 1036F TOBACCO NON-USER: CPT | Performed by: FAMILY MEDICINE

## 2022-03-31 PROCEDURE — 1101F PT FALLS ASSESS-DOCD LE1/YR: CPT | Performed by: FAMILY MEDICINE

## 2022-03-31 PROCEDURE — 3074F SYST BP LT 130 MM HG: CPT | Performed by: FAMILY MEDICINE

## 2022-03-31 RX ORDER — LEVOTHYROXINE SODIUM 0.12 MG/1
125 TABLET ORAL DAILY
Qty: 90 TABLET | Refills: 1 | Status: SHIPPED | OUTPATIENT
Start: 2022-03-31 | End: 2022-07-14 | Stop reason: SDUPTHER

## 2022-03-31 RX ORDER — HYDROCHLOROTHIAZIDE 25 MG/1
25 TABLET ORAL DAILY
Qty: 90 TABLET | Refills: 1 | Status: SHIPPED | OUTPATIENT
Start: 2022-03-31 | End: 2022-07-14 | Stop reason: SDUPTHER

## 2022-03-31 RX ORDER — EZETIMIBE 10 MG/1
10 TABLET ORAL DAILY
Qty: 90 TABLET | Refills: 1 | Status: SHIPPED | OUTPATIENT
Start: 2022-03-31

## 2022-03-31 NOTE — PROGRESS NOTES
Assessment/Plan:  Diabetes mellitus under excellent control hemoglobin A1c of 6 4 since the addition of Januvia    Hypothyroidism euthyroid on current regimen of levothyroxine 125 mcg daily    Dyslipidemia on Zetia 10 mg lipid panel is pending    Essential hypertension with blood pressure controlled on the current regimen    Class 3 obesity with serious comorbidity and a basal metabolic index of 79 86    Problem List Items Addressed This Visit     None      Visit Diagnoses     Dyslipidemia        Acquired hypothyroidism        Essential hypertension               Diagnoses and all orders for this visit:    Dyslipidemia    Acquired hypothyroidism    Essential hypertension        No problem-specific Assessment & Plan notes found for this encounter  PHQ-2/9 Depression Screening    Little interest or pleasure in doing things: 0 - not at all  Feeling down, depressed, or hopeless: 0 - not at all  PHQ-2 Score: 0  PHQ-2 Interpretation: Negative depression screen          There is no height or weight on file to calculate BMI  BMI Counseling: There is no height or weight on file to calculate BMI  The BMI     Subjective:      Patient ID: Michael Franco is a 61 y o  female  HPI    The following portions of the patient's history were reviewed and updated as appropriate:   She has a past medical history of Hyperlipidemia, Hypertension, Hypothyroidism, Morbid obesity ([address] ), Pancreatitis (09/1997), Sleep apnea, Type 2 diabetes mellitus ([facility] ), and Vitamin D deficiency  ,  does not have any pertinent problems on file  ,   has a past surgical history that includes Gastric bypass (05/2013); Cholecystectomy (04/2013); Total knee arthroplasty (Left, 2013); Mammo (historical) (Bilateral, 12/2016); Breast excisional biopsy (Left, 2001); and Breast excisional biopsy (Left, 07/2001)  ,  family history includes Brain cancer in her mother; Breast cancer in her maternal aunt, maternal aunt, and mother; Cancer in her paternal aunt, paternal aunt, paternal aunt, paternal grandfather, and paternal grandmother; Heart attack in her father; Lung cancer in her mother and paternal aunt; No Known Problems in her daughter, sister, sister, sister, and sister; Stroke in her sister  ,   reports that she has never smoked  She has never used smokeless tobacco  She reports previous alcohol use  She reports that she does not use drugs  ,  is allergic to aspirin and nsaids     Current Outpatient Medications   Medication Sig Dispense Refill    amLODIPine (NORVASC) 5 mg tablet take 1 tablet by mouth once daily 90 tablet 1    carvedilol (COREG) 6 25 mg tablet Take 1 tablet (6 25 mg total) by mouth 2 (two) times a day 180 tablet 1    Cyanocobalamin (VITAMIN B-12 PO) Take by mouth      ergocalciferol (VITAMIN D2) 50,000 units Take 1 capsule (50,000 Units total) by mouth once a week 12 capsule 2    ezetimibe (ZETIA) 10 mg tablet Take 1 tablet (10 mg total) by mouth daily 90 tablet 1    hydrochlorothiazide (HYDRODIURIL) 25 mg tablet Take 1 tablet (25 mg total) by mouth daily 90 tablet 1    levothyroxine 125 mcg tablet Take 1 tablet (125 mcg total) by mouth daily 90 tablet 1    metFORMIN (GLUCOPHAGE) 1000 MG tablet Take 1 tablet (1,000 mg total) by mouth 2 (two) times a day 180 tablet 4    sitaGLIPtin (JANUVIA) 50 mg tablet Take 1 tablet (50 mg total) by mouth daily 90 tablet 3     No current facility-administered medications for this visit  Review of Systems   Constitutional: Negative for chills and fever  HENT: Negative for ear pain and sore throat  Eyes: Negative for pain and visual disturbance  Respiratory: Negative for cough and shortness of breath  Cardiovascular: Negative for chest pain and palpitations  Gastrointestinal: Negative for abdominal pain and vomiting  Genitourinary: Negative for dysuria and hematuria  Musculoskeletal: Negative for arthralgias and back pain  Skin: Negative for color change and rash     Neurological: Negative for seizures and syncope  All other systems reviewed and are negative  Objective:    /74   Pulse 84   Temp 98 2 °F (36 8 °C)   Resp 20   There is no height or weight on file to calculate BMI  Physical Exam  Constitutional:       Appearance: She is well-developed  She is obese  HENT:      Head: Normocephalic  Eyes:      Pupils: Pupils are equal, round, and reactive to light  Cardiovascular:      Rate and Rhythm: Normal rate and regular rhythm  Heart sounds: Normal heart sounds  Pulmonary:      Effort: Pulmonary effort is normal       Breath sounds: Normal breath sounds  Abdominal:      General: Bowel sounds are normal       Palpations: Abdomen is soft  Tenderness: There is no abdominal tenderness  Musculoskeletal:      Cervical back: Normal range of motion  Skin:     General: Skin is warm  Neurological:      Mental Status: She is alert and oriented to person, place, and time

## 2022-04-21 ENCOUNTER — VBI (OUTPATIENT)
Dept: ADMINISTRATIVE | Facility: OTHER | Age: 67
End: 2022-04-21

## 2022-04-22 LAB
LEFT EYE DIABETIC RETINOPATHY: NORMAL
RIGHT EYE DIABETIC RETINOPATHY: NORMAL
SEVERITY (EYE EXAM): NORMAL

## 2022-05-02 ENCOUNTER — NURSE TRIAGE (OUTPATIENT)
Dept: OTHER | Facility: OTHER | Age: 67
End: 2022-05-02

## 2022-05-02 ENCOUNTER — OFFICE VISIT (OUTPATIENT)
Dept: FAMILY MEDICINE CLINIC | Facility: CLINIC | Age: 67
End: 2022-05-02
Payer: COMMERCIAL

## 2022-05-02 ENCOUNTER — APPOINTMENT (OUTPATIENT)
Dept: LAB | Facility: HOSPITAL | Age: 67
End: 2022-05-02
Attending: FAMILY MEDICINE
Payer: COMMERCIAL

## 2022-05-02 VITALS
RESPIRATION RATE: 20 BRPM | DIASTOLIC BLOOD PRESSURE: 84 MMHG | SYSTOLIC BLOOD PRESSURE: 134 MMHG | HEIGHT: 65 IN | BODY MASS INDEX: 36.99 KG/M2 | WEIGHT: 222 LBS | HEART RATE: 84 BPM | TEMPERATURE: 97.6 F

## 2022-05-02 DIAGNOSIS — E55.9 VITAMIN D DEFICIENCY: ICD-10-CM

## 2022-05-02 DIAGNOSIS — E66.01 CLASS 3 SEVERE OBESITY DUE TO EXCESS CALORIES WITH SERIOUS COMORBIDITY AND BODY MASS INDEX (BMI) OF 40.0 TO 44.9 IN ADULT (HCC): ICD-10-CM

## 2022-05-02 DIAGNOSIS — E11.69 DIABETES MELLITUS TYPE 2 IN OBESE (HCC): ICD-10-CM

## 2022-05-02 DIAGNOSIS — K52.9 GASTROENTERITIS: Primary | ICD-10-CM

## 2022-05-02 DIAGNOSIS — E66.9 DIABETES MELLITUS TYPE 2 IN OBESE (HCC): ICD-10-CM

## 2022-05-02 DIAGNOSIS — E03.9 ACQUIRED HYPOTHYROIDISM: ICD-10-CM

## 2022-05-02 DIAGNOSIS — I10 ESSENTIAL HYPERTENSION: ICD-10-CM

## 2022-05-02 DIAGNOSIS — Z53.20 SCREENING FOR HEPATITIS C DECLINED: ICD-10-CM

## 2022-05-02 LAB
25(OH)D3 SERPL-MCNC: 47.2 NG/ML (ref 30–100)
CHOLEST SERPL-MCNC: 154 MG/DL
HCV AB SER QL: NORMAL
HDLC SERPL-MCNC: 55 MG/DL
LDLC SERPL CALC-MCNC: 72 MG/DL (ref 0–100)
TRIGL SERPL-MCNC: 135 MG/DL
TSH SERPL DL<=0.05 MIU/L-ACNC: 1.85 UIU/ML (ref 0.45–4.5)

## 2022-05-02 PROCEDURE — 1036F TOBACCO NON-USER: CPT | Performed by: FAMILY MEDICINE

## 2022-05-02 PROCEDURE — 3075F SYST BP GE 130 - 139MM HG: CPT | Performed by: FAMILY MEDICINE

## 2022-05-02 PROCEDURE — 36415 COLL VENOUS BLD VENIPUNCTURE: CPT

## 2022-05-02 PROCEDURE — 80061 LIPID PANEL: CPT

## 2022-05-02 PROCEDURE — 84443 ASSAY THYROID STIM HORMONE: CPT

## 2022-05-02 PROCEDURE — 82306 VITAMIN D 25 HYDROXY: CPT

## 2022-05-02 PROCEDURE — 99214 OFFICE O/P EST MOD 30 MIN: CPT | Performed by: FAMILY MEDICINE

## 2022-05-02 PROCEDURE — 86803 HEPATITIS C AB TEST: CPT

## 2022-05-02 PROCEDURE — 3079F DIAST BP 80-89 MM HG: CPT | Performed by: FAMILY MEDICINE

## 2022-05-02 PROCEDURE — 3008F BODY MASS INDEX DOCD: CPT | Performed by: FAMILY MEDICINE

## 2022-05-02 RX ORDER — SACCHAROMYCES BOULARDII 250 MG
250 CAPSULE ORAL 2 TIMES DAILY
Qty: 60 CAPSULE | Refills: 1 | Status: SHIPPED | OUTPATIENT
Start: 2022-05-02

## 2022-05-02 RX ORDER — BLOOD SUGAR DIAGNOSTIC
STRIP MISCELLANEOUS
Qty: 100 EACH | Refills: 3 | Status: SHIPPED | OUTPATIENT
Start: 2022-05-02 | End: 2022-05-31

## 2022-05-02 RX ORDER — BLOOD-GLUCOSE METER
KIT MISCELLANEOUS
Qty: 1 KIT | Refills: 0 | Status: SHIPPED | OUTPATIENT
Start: 2022-05-02 | End: 2022-05-05

## 2022-05-02 RX ORDER — METFORMIN HYDROCHLORIDE 500 MG/1
2000 TABLET, EXTENDED RELEASE ORAL
Qty: 360 TABLET | Refills: 1 | Status: SHIPPED | OUTPATIENT
Start: 2022-05-02 | End: 2022-10-29

## 2022-05-02 RX ORDER — DIPHENOXYLATE HYDROCHLORIDE AND ATROPINE SULFATE 2.5; .025 MG/1; MG/1
1 TABLET ORAL 4 TIMES DAILY PRN
Qty: 30 TABLET | Refills: 0 | Status: SHIPPED | OUTPATIENT
Start: 2022-05-02

## 2022-05-02 RX ORDER — LANCETS 33 GAUGE
EACH MISCELLANEOUS
Qty: 100 EACH | Refills: 3 | Status: SHIPPED | OUTPATIENT
Start: 2022-05-02 | End: 2022-05-31

## 2022-05-02 NOTE — TELEPHONE ENCOUNTER
Regarding: Diarrhea  ----- Message from Kennedy Kwan sent at 5/2/2022  9:40 AM EDT -----  "I've had diarrhea since last Tuesday "

## 2022-05-02 NOTE — PROGRESS NOTES
Assessment/Plan:  Gastroenteritis in a diabetic  The patient was told to discontinue metformin to check her glucose daily and to stay with a bland diet of bananas applesauce toast yogurt and bouillon to work her way to quit vegetables, took nothing fried fatty or greasy for 5 days  Patient was informed that laboratory has been ordered along with a stool culture and a urinalysis  The patient will begin a probiotic twice daily and will treat the diarrhea symptomatically with Lomotil  The patient has not been testing her blood glucose because of lack of a machine a machine kit has been ordered  The patient will have metformin on hold metformin XR has been ordered if we need to return to metformin due to elevations of the glucose  Hypothyroidism euthyroid on current regimen of levothyroxine 125 mcg    Hypertensive cardiovascular disease on Norvasc 5 mg and Coreg 6 25 b i d  Diuresed with HydroDIURIL 25 mg    Problem List Items Addressed This Visit        Endocrine    Diabetes mellitus type 2 in obese (HCC)    Relevant Medications    metFORMIN (GLUCOPHAGE-XR) 500 mg 24 hr tablet    Blood Glucose Monitoring Suppl (OneTouch Verio Reflect) w/Device KIT    glucose blood (OneTouch Verio) test strip    OneTouch Delica Lancets 09D MISC    Other Relevant Orders    Comprehensive metabolic panel    CBC and Platelet    Lipid Panel with Direct LDL reflex           Diagnoses and all orders for this visit:    Diabetes mellitus type 2 in obese (HCC)  -     metFORMIN (GLUCOPHAGE-XR) 500 mg 24 hr tablet; Take 4 tablets (2,000 mg total) by mouth daily with breakfast  -     Comprehensive metabolic panel; Future  -     CBC and Platelet; Future  -     Lipid Panel with Direct LDL reflex; Future  -     Blood Glucose Monitoring Suppl (OneTouch Verio Reflect) w/Device KIT; Check blood sugars once daily  Please substitute with appropriate alternative as covered by patient's insurance   Dx: E11 65  -     glucose blood (OneTouch Verio) test strip; Check blood sugars once daily  Please substitute with appropriate alternative as covered by patient's insurance  Dx: E11 65  -     OneTouch Delica Lancets 50B MISC; Check blood sugars once daily  Please substitute with appropriate alternative as covered by patient's insurance  Dx: E11 65        No problem-specific Assessment & Plan notes found for this encounter  PHQ-2/9 Depression Screening            Body mass index is 37 52 kg/m²  BMI Counseling: Body mass index is 37 52 kg/m²  The BMI   Subjective:      Patient ID: Elisa Fletcher is a 61 y o  female  Patient presents with a chief complaint of 6 days of diarrhea      The following portions of the patient's history were reviewed and updated as appropriate:   She has a past medical history of Hyperlipidemia, Hypertension, Hypothyroidism, Morbid obesity ([address] ), Pancreatitis (09/1997), Sleep apnea, Type 2 diabetes mellitus ([facility] ), and Vitamin D deficiency  ,  does not have any pertinent problems on file  ,   has a past surgical history that includes Gastric bypass (05/2013); Cholecystectomy (04/2013); Total knee arthroplasty (Left, 2013); Mammo (historical) (Bilateral, 12/2016); Breast excisional biopsy (Left, 2001); and Breast excisional biopsy (Left, 07/2001)  ,  family history includes Brain cancer in her mother; Breast cancer in her maternal aunt, maternal aunt, and mother; Cancer in her paternal aunt, paternal aunt, paternal aunt, paternal grandfather, and paternal grandmother; Heart attack in her father; Lung cancer in her mother and paternal aunt; No Known Problems in her daughter, sister, sister, sister, and sister; Stroke in her sister  ,   reports that she has never smoked  She has never used smokeless tobacco  She reports previous alcohol use  She reports that she does not use drugs  ,  is allergic to aspirin and nsaids     Current Outpatient Medications   Medication Sig Dispense Refill    amLODIPine (NORVASC) 5 mg tablet take 1 tablet by mouth once daily 90 tablet 1    Blood Glucose Monitoring Suppl (OneTouch Verio Reflect) w/Device KIT Check blood sugars once daily  Please substitute with appropriate alternative as covered by patient's insurance  Dx: E11 65 1 kit 0    carvedilol (COREG) 6 25 mg tablet Take 1 tablet (6 25 mg total) by mouth 2 (two) times a day 180 tablet 1    Cyanocobalamin (VITAMIN B-12 PO) Take by mouth      ergocalciferol (VITAMIN D2) 50,000 units Take 1 capsule (50,000 Units total) by mouth once a week 12 capsule 2    ezetimibe (ZETIA) 10 mg tablet Take 1 tablet (10 mg total) by mouth daily 90 tablet 1    glucose blood (OneTouch Verio) test strip Check blood sugars once daily  Please substitute with appropriate alternative as covered by patient's insurance  Dx: E11 65 100 each 3    hydrochlorothiazide (HYDRODIURIL) 25 mg tablet Take 1 tablet (25 mg total) by mouth daily 90 tablet 1    levothyroxine 125 mcg tablet Take 1 tablet (125 mcg total) by mouth daily 90 tablet 1    metFORMIN (GLUCOPHAGE-XR) 500 mg 24 hr tablet Take 4 tablets (2,000 mg total) by mouth daily with breakfast 360 tablet 1    OneTouch Delica Lancets 34R MISC Check blood sugars once daily  Please substitute with appropriate alternative as covered by patient's insurance  Dx: E11 65 100 each 3    sitaGLIPtin (JANUVIA) 50 mg tablet Take 1 tablet (50 mg total) by mouth daily 90 tablet 3     No current facility-administered medications for this visit  Review of Systems   Constitutional: Negative for chills and fever  HENT: Negative for ear pain and sore throat  Eyes: Negative for pain and visual disturbance  Respiratory: Negative for cough and shortness of breath  Cardiovascular: Negative for chest pain and palpitations  Gastrointestinal: Positive for diarrhea  Negative for abdominal pain and vomiting  Genitourinary: Negative for dysuria and hematuria  Musculoskeletal: Negative for arthralgias and back pain     Skin: Negative for color change and rash  Neurological: Negative for seizures and syncope  All other systems reviewed and are negative  Objective:    /84   Pulse 84   Temp 97 6 °F (36 4 °C)   Resp 20   Ht 5' 4 5" (1 638 m)   Wt 101 kg (222 lb)   BMI 37 52 kg/m²   Body mass index is 37 52 kg/m²  Physical Exam  Constitutional:       Appearance: She is well-developed  She is obese  HENT:      Head: Normocephalic  Eyes:      Pupils: Pupils are equal, round, and reactive to light  Cardiovascular:      Rate and Rhythm: Normal rate and regular rhythm  Heart sounds: Normal heart sounds  Pulmonary:      Effort: Pulmonary effort is normal       Breath sounds: Normal breath sounds  Abdominal:      General: Bowel sounds are normal       Palpations: Abdomen is soft  Tenderness: There is no abdominal tenderness  Musculoskeletal:      Cervical back: Normal range of motion  Skin:     General: Skin is warm  Neurological:      Mental Status: She is alert and oriented to person, place, and time

## 2022-05-02 NOTE — TELEPHONE ENCOUNTER
Reason for Disposition   SEVERE diarrhea (e g , 7 or more times / day more than normal) and present > 24 hours (1 day)    Answer Assessment - Initial Assessment Questions  1  DIARRHEA SEVERITY: "How bad is the diarrhea?" "How many extra stools have you had in the past 24 hours than normal?"     - NO DIARRHEA (SCALE 0)    - MILD (SCALE 1-3): Few loose or mushy BMs; increase of 1-3 stools over normal daily number of stools; mild increase in ostomy output  -  MODERATE (SCALE 4-7): Increase of 4-6 stools daily over normal; moderate increase in ostomy output  * SEVERE (SCALE 8-10; OR 'WORST POSSIBLE'): Increase of 7 or more stools daily over normal; moderate increase in ostomy output; incontinence  denies  2  ONSET: "When did the diarrhea begin?"       Tuesday last week  3  BM CONSISTENCY: "How loose or watery is the diarrhea?"       watery  4  VOMITING: "Are you also vomiting?" If Yes, ask: "How many times in the past 24 hours?"       denies  5  ABDOMINAL PAIN: "Are you having any abdominal pain?" If Yes, ask: "What does it feel like?" (e g , crampy, dull, intermittent, constant)       denies  6  ABDOMINAL PAIN SEVERITY: If present, ask: "How bad is the pain?"  (e g , Scale 1-10; mild, moderate, or severe)    - MILD (1-3): doesn't interfere with normal activities, abdomen soft and not tender to touch     - MODERATE (4-7): interferes with normal activities or awakens from sleep, tender to touch     - SEVERE (8-10): excruciating pain, doubled over, unable to do any normal activities        yes  7  ORAL INTAKE: If vomiting, "Have you been able to drink liquids?" "How much fluids have you had in the past 24 hours?"      yes  8  HYDRATION: "Any signs of dehydration?" (e g , dry mouth [not just dry lips], too weak to stand, dizziness, new weight loss) "When did you last urinate?"      denies  9   EXPOSURE: "Have you traveled to a foreign country recently?" "Have you been exposed to anyone with diarrhea?" "Could you have eaten any food that was spoiled?"      denies  10  ANTIBIOTIC USE: "Are you taking antibiotics now or have you taken antibiotics in the past 2 months?"        denies  11   OTHER SYMPTOMS: "Do you have any other symptoms?" (e g , fever, blood in stool)        denies    Protocols used: DIARRHEA-ADULT-OH

## 2022-05-03 ENCOUNTER — APPOINTMENT (OUTPATIENT)
Dept: LAB | Facility: HOSPITAL | Age: 67
End: 2022-05-03
Attending: FAMILY MEDICINE
Payer: COMMERCIAL

## 2022-05-03 DIAGNOSIS — K52.9 GASTROENTERITIS: ICD-10-CM

## 2022-05-03 LAB
CREAT UR-MCNC: 245 MG/DL
MICROALBUMIN UR-MCNC: 17.9 MG/L (ref 0–20)
MICROALBUMIN/CREAT 24H UR: 7 MG/G CREATININE (ref 0–30)

## 2022-05-03 PROCEDURE — 3061F NEG MICROALBUMINURIA REV: CPT | Performed by: FAMILY MEDICINE

## 2022-05-03 PROCEDURE — 82570 ASSAY OF URINE CREATININE: CPT

## 2022-05-03 PROCEDURE — 87505 NFCT AGENT DETECTION GI: CPT

## 2022-05-03 PROCEDURE — 82043 UR ALBUMIN QUANTITATIVE: CPT

## 2022-05-03 PROCEDURE — 87186 SC STD MICRODIL/AGAR DIL: CPT

## 2022-05-04 LAB
CAMPYLOBACTER DNA SPEC NAA+PROBE: ABNORMAL
SALMONELLA DNA SPEC QL NAA+PROBE: DETECTED
SHIGA TOXIN STX GENE SPEC NAA+PROBE: ABNORMAL
SHIGELLA DNA SPEC QL NAA+PROBE: ABNORMAL

## 2022-05-05 ENCOUNTER — TELEPHONE (OUTPATIENT)
Dept: FAMILY MEDICINE CLINIC | Facility: CLINIC | Age: 67
End: 2022-05-05

## 2022-05-05 RX ORDER — BLOOD-GLUCOSE METER
KIT MISCELLANEOUS
Qty: 1 KIT | Refills: 0 | Status: SHIPPED | OUTPATIENT
Start: 2022-05-05 | End: 2022-05-31

## 2022-05-05 RX ORDER — BLOOD-GLUCOSE METER
KIT MISCELLANEOUS
Qty: 1 KIT | Refills: 0 | Status: SHIPPED | OUTPATIENT
Start: 2022-05-05 | End: 2022-05-05

## 2022-05-05 NOTE — TELEPHONE ENCOUNTER
Informed the patient that she has salmonella informed her that the treatment is hydration antidiarrheals  Also informed her that antibiotics are not used unless there is considered sepsis      Also told patient she needs a glucometer to keep an eye on her sugar

## 2022-05-06 ENCOUNTER — TELEPHONE (OUTPATIENT)
Dept: FAMILY MEDICINE CLINIC | Facility: CLINIC | Age: 67
End: 2022-05-06

## 2022-05-06 DIAGNOSIS — E66.9 DIABETES MELLITUS TYPE 2 IN OBESE (HCC): Primary | ICD-10-CM

## 2022-05-06 DIAGNOSIS — E11.69 DIABETES MELLITUS TYPE 2 IN OBESE (HCC): Primary | ICD-10-CM

## 2022-05-06 NOTE — TELEPHONE ENCOUNTER
Called to confirm receipt of salmonella positive test - advised doctor spoke to patient yesterday to keep hydrated and use antidiarrheals

## 2022-05-13 LAB — CULTURE ID FROM ENTERIC PCR: ABNORMAL

## 2022-05-31 RX ORDER — BLOOD-GLUCOSE METER
EACH MISCELLANEOUS DAILY
Start: 2022-05-31 | End: 2022-06-09

## 2022-05-31 RX ORDER — LANCETS 30 GAUGE
EACH MISCELLANEOUS DAILY
Qty: 100 EACH | Refills: 1 | Status: SHIPPED | OUTPATIENT
Start: 2022-05-31 | End: 2022-06-09

## 2022-05-31 RX ORDER — LANCETS 30 GAUGE
EACH MISCELLANEOUS DAILY
Qty: 100 EACH | Refills: 1 | Status: SHIPPED | OUTPATIENT
Start: 2022-05-31 | End: 2022-05-31

## 2022-05-31 NOTE — TELEPHONE ENCOUNTER
Dr Leila Quintero,   please sign the pending prescriptions to be faxed into patient's DME supplier  Thank you

## 2022-06-09 RX ORDER — BLOOD-GLUCOSE METER
EACH MISCELLANEOUS DAILY
Qty: 1 EACH | Refills: 0 | Status: SHIPPED | OUTPATIENT
Start: 2022-06-09

## 2022-06-09 RX ORDER — LANCETS 30 GAUGE
EACH MISCELLANEOUS DAILY
Qty: 100 EACH | Refills: 1 | Status: SHIPPED | OUTPATIENT
Start: 2022-06-09 | End: 2022-06-17

## 2022-06-17 DIAGNOSIS — E11.69 DIABETES MELLITUS TYPE 2 IN OBESE (HCC): ICD-10-CM

## 2022-06-17 DIAGNOSIS — E66.9 DIABETES MELLITUS TYPE 2 IN OBESE (HCC): ICD-10-CM

## 2022-06-17 RX ORDER — LANCETS 30 GAUGE
EACH MISCELLANEOUS DAILY
Qty: 100 EACH | Refills: 11 | Status: SHIPPED | OUTPATIENT
Start: 2022-06-17

## 2022-06-17 NOTE — TELEPHONE ENCOUNTER
Patient states she has not received any of her glucose supplies - was given wrong fax # - would like to have the orders refaxed to [phone]    Please resign orders

## 2022-06-20 NOTE — TELEPHONE ENCOUNTER
Got correct fax from Lebron Masters at [employer] and [employer] [facility] - [phone] - faxed

## 2022-06-27 DIAGNOSIS — E11.69 DIABETES MELLITUS TYPE 2 IN OBESE (HCC): Primary | ICD-10-CM

## 2022-06-27 DIAGNOSIS — E66.9 DIABETES MELLITUS TYPE 2 IN OBESE (HCC): Primary | ICD-10-CM

## 2022-06-27 NOTE — PROGRESS NOTES
Patient called, requesting Labs for upcoming DM appointment; post increase of Metformin at last appointment

## 2022-07-06 LAB — HBA1C MFR BLD HPLC: 6.6 %

## 2022-07-06 PROCEDURE — 3044F HG A1C LEVEL LT 7.0%: CPT | Performed by: FAMILY MEDICINE

## 2022-07-14 ENCOUNTER — OFFICE VISIT (OUTPATIENT)
Dept: FAMILY MEDICINE CLINIC | Facility: CLINIC | Age: 67
End: 2022-07-14
Payer: COMMERCIAL

## 2022-07-14 ENCOUNTER — TELEPHONE (OUTPATIENT)
Dept: ADMINISTRATIVE | Facility: OTHER | Age: 67
End: 2022-07-14

## 2022-07-14 VITALS
HEART RATE: 84 BPM | HEIGHT: 65 IN | BODY MASS INDEX: 37.32 KG/M2 | DIASTOLIC BLOOD PRESSURE: 80 MMHG | TEMPERATURE: 98.3 F | RESPIRATION RATE: 20 BRPM | SYSTOLIC BLOOD PRESSURE: 132 MMHG | WEIGHT: 224 LBS

## 2022-07-14 DIAGNOSIS — E66.9 DIABETES MELLITUS TYPE 2 IN OBESE (HCC): ICD-10-CM

## 2022-07-14 DIAGNOSIS — E03.9 ACQUIRED HYPOTHYROIDISM: ICD-10-CM

## 2022-07-14 DIAGNOSIS — E11.69 DIABETES MELLITUS TYPE 2 IN OBESE (HCC): ICD-10-CM

## 2022-07-14 DIAGNOSIS — Z00.00 ANNUAL PHYSICAL EXAM: Primary | ICD-10-CM

## 2022-07-14 DIAGNOSIS — I10 ESSENTIAL HYPERTENSION: ICD-10-CM

## 2022-07-14 DIAGNOSIS — N18.2 STAGE 2 CHRONIC KIDNEY DISEASE: ICD-10-CM

## 2022-07-14 DIAGNOSIS — E66.9 OBESITY (BMI 30-39.9): ICD-10-CM

## 2022-07-14 PROCEDURE — 99214 OFFICE O/P EST MOD 30 MIN: CPT | Performed by: FAMILY MEDICINE

## 2022-07-14 PROCEDURE — 3079F DIAST BP 80-89 MM HG: CPT | Performed by: FAMILY MEDICINE

## 2022-07-14 PROCEDURE — 3066F NEPHROPATHY DOC TX: CPT | Performed by: FAMILY MEDICINE

## 2022-07-14 PROCEDURE — 3075F SYST BP GE 130 - 139MM HG: CPT | Performed by: FAMILY MEDICINE

## 2022-07-14 PROCEDURE — 99396 PREV VISIT EST AGE 40-64: CPT | Performed by: FAMILY MEDICINE

## 2022-07-14 RX ORDER — HYDROCHLOROTHIAZIDE 25 MG/1
25 TABLET ORAL DAILY
Qty: 90 TABLET | Refills: 1 | Status: SHIPPED | OUTPATIENT
Start: 2022-07-14

## 2022-07-14 RX ORDER — AMLODIPINE BESYLATE 5 MG/1
5 TABLET ORAL DAILY
Qty: 90 TABLET | Refills: 1 | Status: SHIPPED | OUTPATIENT
Start: 2022-07-14 | End: 2022-10-20 | Stop reason: SDUPTHER

## 2022-07-14 RX ORDER — LEVOTHYROXINE SODIUM 0.12 MG/1
125 TABLET ORAL DAILY
Qty: 90 TABLET | Refills: 1 | Status: SHIPPED | OUTPATIENT
Start: 2022-07-14

## 2022-07-14 NOTE — TELEPHONE ENCOUNTER
Upon review of the In Basket request and the patient's chart, initial outreach has been made via fax, please see Contacts section for details        x1 eye    Thank you  [place]

## 2022-07-14 NOTE — PROGRESS NOTES
[address] PRIMARY CARE    NAME: Shonna Santiago  AGE: 61 y o  SEX: female  : 1955     DATE: 2022     Assessment and Plan:  Diabetes mellitus under excellent control with a hemoglobin A1c at 6 4    Hypothyroidism euthyroid on current dose of levothyroxine 125 mcg daily    Dyslipidemia treated with Zetia with a total cholesterol of 154 triglycerides of 135 HDL of 55 and LDL of 72 all desirable    Hypertensive cardiovascular disease with blood pressure controlled on the current regimen    Vitamin-D of 47 2 post supplementation    Morbid obesity with BMI of 37 86 diet has been discussed    Chronic kidney disease stage 2 mild    Had colonoscopy in 2017 is due for colonoscopy in      Problem List Items Addressed This Visit        Endocrine    Diabetes mellitus type 2 in obese ([address] )    Relevant Orders    Hemoglobin A1C    Comprehensive metabolic panel    CBC and differential    TSH, 3rd generation with Free T4 reflex    Lipid Panel with Direct LDL reflex      Other Visit Diagnoses     Annual physical exam    -  Primary    Essential hypertension        Relevant Medications    amLODIPine (NORVASC) 5 mg tablet    hydrochlorothiazide (HYDRODIURIL) 25 mg tablet    Stage 2 chronic kidney disease        Relevant Medications    hydrochlorothiazide (HYDRODIURIL) 25 mg tablet    Acquired hypothyroidism        Relevant Medications    levothyroxine 125 mcg tablet    Obesity (BMI 30-39  9)              Immunizations and preventive care screenings were discussed with patient today  Appropriate education was printed on patient's after visit summary  Counseling:   Alcohol/drug use: discussed moderation in alcohol intake, the recommendations for healthy alcohol use, and avoidance of illicit drug use   Dental Health: discussed importance of regular tooth brushing, flossing, and dental visits     Injury prevention: discussed safety/seat belts, safety helmets, smoke detectors, carbon dioxide detectors, and smoking near bedding or upholstery   Sexual health: discussed sexually transmitted diseases, partner selection, use of condoms, avoidance of unintended pregnancy, and contraceptive alternatives  · Exercise: the importance of regular exercise/physical activity was discussed  Recommend exercise 3-5 times per week for at least 30 minutes  BMI Counseling: Body mass index is 37 86 kg/m²  The BMI is above normal  Nutrition recommendations include decreasing portion sizes, encouraging healthy choices of fruits and vegetables, decreasing fast food intake, consuming healthier snacks, limiting drinks that contain sugar, moderation in carbohydrate intake, increasing intake of lean protein, reducing intake of saturated and trans fat and reducing intake of cholesterol  Exercise recommendations include moderate physical activity 150 minutes/week  Rationale for BMI follow-up plan is due to patient being overweight or obese  Return in about 4 months (around 11/14/2022)  Chief Complaint:     Chief Complaint   Patient presents with    Annual Exam    Follow-up     3 month check up       History of Present Illness:     Adult Annual Physical   Patient here for a comprehensive physical exam  The patient reports no problems  Diet and Physical Activity  · Diet/Nutrition: well balanced diet  · Exercise: walking  Depression Screening  PHQ-2/9 Depression Screening         General Health  · Sleep: sleeps well  · Hearing: normal - bilateral   · Vision: wears glasses  · Dental: regular dental visits, brushes teeth twice daily and flosses teeth occasionally  /GYN Health  · Patient is: postmenopausal  · Last menstrual period: yrs  · Contraceptive method: Menopause  Review of Systems:     Review of Systems   Constitutional: Negative for chills and fever  HENT: Negative for ear pain and sore throat      Eyes: Negative for pain and visual disturbance  Respiratory: Negative for cough and shortness of breath  Cardiovascular: Negative for chest pain and palpitations  Gastrointestinal: Negative for abdominal pain and vomiting  Genitourinary: Negative for dysuria and hematuria  Musculoskeletal: Negative for arthralgias and back pain  Skin: Negative for color change and rash  Neurological: Negative for seizures and syncope  All other systems reviewed and are negative       Past Medical History:     Past Medical History:   Diagnosis Date    Hyperlipidemia     Hypertension     Hypothyroidism     Morbid obesity ([facility] )     Pancreatitis 09/1997    Sleep apnea     Type 2 diabetes mellitus ([facility] )     Vitamin D deficiency       Past Surgical History:     Past Surgical History:   Procedure Laterality Date    BREAST EXCISIONAL BIOPSY Left 2001    benign     BREAST EXCISIONAL BIOPSY Left 07/2001    CHOLECYSTECTOMY  04/2013    GASTRIC BYPASS  05/2013    MAMMO (HISTORICAL) Bilateral 12/2016    [facility] [employer]    TOTAL KNEE ARTHROPLASTY Left 2013      Social History:     Social History     Socioeconomic History    Marital status: /Civil Union     Spouse name: None    Number of children: None    Years of education: None    Highest education level: None   Occupational History    None   Tobacco Use    Smoking status: Never Smoker    Smokeless tobacco: Never Used   Vaping Use    Vaping Use: Never used   Substance and Sexual Activity    Alcohol use: Not Currently    Drug use: Never    Sexual activity: None   Other Topics Concern    None   Social History Narrative    None     Social Determinants of Health     Financial Resource Strain: Not on file   Food Insecurity: Not on file   Transportation Needs: Not on file   Physical Activity: Not on file   Stress: Not on file   Social Connections: Not on file   Intimate Partner Violence: Not on file   Housing Stability: Not on file      Family History:     Family History   Problem Relation Age of Onset    Breast cancer Mother     Lung cancer Mother     Brain cancer Mother     Breast cancer Maternal Aunt     Breast cancer Maternal Aunt     Heart attack Father     Stroke Sister     No Known Problems Daughter     Cancer Paternal Grandmother         unknown origin     Cancer Paternal Grandfather         unknown origin     No Known Problems Sister     No Known Problems Sister     No Known Problems Sister     No Known Problems Sister     Cancer Paternal Aunt         unknown origin    Cancer Paternal Aunt         unknown origin    Cancer Paternal Aunt         unknown origin    Lung cancer Paternal Aunt       Current Medications:     Current Outpatient Medications   Medication Sig Dispense Refill    amLODIPine (NORVASC) 5 mg tablet Take 1 tablet (5 mg total) by mouth daily 90 tablet 1    hydrochlorothiazide (HYDRODIURIL) 25 mg tablet Take 1 tablet (25 mg total) by mouth daily 90 tablet 1    levothyroxine 125 mcg tablet Take 1 tablet (125 mcg total) by mouth daily 90 tablet 1    Blood Glucose Monitoring Suppl ([employer] Blood Glucose Monitor) CARLOS Use in the morning 1 each 0    carvedilol (COREG) 6 25 mg tablet Take 1 tablet (6 25 mg total) by mouth 2 (two) times a day 180 tablet 1    Cyanocobalamin (VITAMIN B-12 PO) Take by mouth      diphenoxylate-atropine (LOMOTIL) 2 5-0 025 mg per tablet Take 1 tablet by mouth 4 (four) times a day as needed for diarrhea 30 tablet 0    ergocalciferol (VITAMIN D2) 50,000 units Take 1 capsule (50,000 Units total) by mouth once a week 12 capsule 2    ezetimibe (ZETIA) 10 mg tablet Take 1 tablet (10 mg total) by mouth daily 90 tablet 1    glucose blood test strip Use 1 each daily Use as instructed 100 strip 11    Lancets MISC Use daily 100 each 11    metFORMIN (GLUCOPHAGE-XR) 500 mg 24 hr tablet Take 4 tablets (2,000 mg total) by mouth daily with breakfast 360 tablet 1    saccharomyces boulardii (FLORASTOR) 250 mg capsule Take 1 capsule (250 mg total) by mouth 2 (two) times a day 60 capsule 1    sitaGLIPtin (JANUVIA) 50 mg tablet Take 1 tablet (50 mg total) by mouth daily 90 tablet 3     No current facility-administered medications for this visit  Allergies: Allergies   Allergen Reactions    Aspirin     Nsaids       Physical Exam:     /80   Pulse 84   Temp 98 3 °F (36 8 °C)   Resp 20   Ht 5' 4 5" (1 638 m)   Wt 102 kg (224 lb) Comment: patient reported  BMI 37 86 kg/m²     Physical Exam  Vitals and nursing note reviewed  Constitutional:       General: She is not in acute distress  Appearance: She is well-developed  She is obese  HENT:      Head: Normocephalic and atraumatic  Eyes:      Conjunctiva/sclera: Conjunctivae normal    Cardiovascular:      Rate and Rhythm: Normal rate and regular rhythm  Heart sounds: No murmur heard  Pulmonary:      Effort: Pulmonary effort is normal  No respiratory distress  Breath sounds: Normal breath sounds  Abdominal:      Palpations: Abdomen is soft  Tenderness: There is no abdominal tenderness  Musculoskeletal:      Cervical back: Neck supple  Skin:     General: Skin is warm and dry  Neurological:      Mental Status: She is alert            Nita Palomo, DO  [facility] PRIMARY CARE

## 2022-07-14 NOTE — TELEPHONE ENCOUNTER
----- Message from Amandeep Aquino sent at 7/14/2022 10:06 AM EDT -----  Regarding: Care Gaps Request  07/14/22 10:06 AM    Hello, our patient Fabrice Tapia has had Diabetic Eye Exam completed/performed  Please assist in updating the patient chart by making an External outreach to [facility] facility located in [place] [address]  The date of service is 2022      Thank you,  Amandeep Aquino  [facility]

## 2022-07-14 NOTE — LETTER
Diabetic Eye Exam Form    Date Requested: 22  Patient: Damaris Johnston  Patient : 1955   Referring Provider: Guillermo Crowell DO    DIABETIC Eye Exam Date _______________________________    Type of Exam MUST be documented for Diabetic Eye Exams  Please CHECK ONE  Retinal Exam       Dilated Retinal Exam       OCT       Optomap-Iris Exam      Fundus Photography     Left Eye - Please check Retinopathy AND Type or No Retinopathy      Exam did show retinopathy    Exam did not show retinopathy         Mild     Proliferative           Moderate    Severe            None         Right Eye - Please check Retinopathy AND Type or No Retinopathy     Exam did show retinopathy    Exam did not show retinopathy         Mild     Proliferative        Moderate    Severe        None       Comments __________________________________________________________    Practice Providing Exam ______________________________________________    Exam Performed By (print name) _______________________________________      Provider Signature ___________________________________________________    These reports are needed for  compliance  Please fax this completed form and a copy of the Diabetic Eye Exam report to our office located at [place] [address] as soon as possible via [phone] melodie [place]: Phone [phone]  We thank you for your assistance in treating our mutual patient

## 2022-07-14 NOTE — PATIENT INSTRUCTIONS

## 2022-07-15 LAB
EXTERNAL HIV SCREEN: NORMAL
HCV AB SER-ACNC: 0.1

## 2022-07-15 NOTE — TELEPHONE ENCOUNTER
Upon review of the In Basket request we were able to locate, review, and update the patient chart as requested for Diabetic Eye Exam     Any additional questions or concerns should be emailed to the Practice Liaisons via [email or web address] email, please do not reply via In Basket      Thank you  Para Staff

## 2022-07-29 ENCOUNTER — TELEPHONE (OUTPATIENT)
Dept: FAMILY MEDICINE CLINIC | Facility: CLINIC | Age: 67
End: 2022-07-29

## 2022-07-29 DIAGNOSIS — N39.0 URINARY TRACT INFECTION WITHOUT HEMATURIA, SITE UNSPECIFIED: Primary | ICD-10-CM

## 2022-07-29 RX ORDER — CIPROFLOXACIN 250 MG/1
250 TABLET, FILM COATED ORAL EVERY 12 HOURS SCHEDULED
Qty: 10 TABLET | Refills: 0 | Status: SHIPPED | OUTPATIENT
Start: 2022-07-29 | End: 2022-08-03

## 2022-08-19 ENCOUNTER — TELEPHONE (OUTPATIENT)
Dept: FAMILY MEDICINE CLINIC | Facility: CLINIC | Age: 67
End: 2022-08-19

## 2022-08-19 DIAGNOSIS — K52.9 GASTROENTERITIS: ICD-10-CM

## 2022-08-19 RX ORDER — DIPHENOXYLATE HYDROCHLORIDE AND ATROPINE SULFATE 2.5; .025 MG/1; MG/1
1 TABLET ORAL 4 TIMES DAILY PRN
Qty: 30 TABLET | Refills: 0 | Status: SHIPPED | OUTPATIENT
Start: 2022-08-19 | End: 2022-09-06 | Stop reason: SDUPTHER

## 2022-08-19 NOTE — TELEPHONE ENCOUNTER
C/O watery diarrhea again x 3 days - not getting any better - unable to come in - unable to leave toilet - not eating or drinking - runs right through her - tried probiotics but made no better - defers ER - had salmonella back in May - no fever - negative covid test    Please advise

## 2022-08-19 NOTE — TELEPHONE ENCOUNTER
Spoke to patient - states she did take a lomotil and it didn't help - asked if she took it 4 x a day - said no - advised take 4 x a day - patient understood

## 2022-08-20 ENCOUNTER — APPOINTMENT (OUTPATIENT)
Dept: LAB | Facility: HOSPITAL | Age: 67
End: 2022-08-20
Attending: FAMILY MEDICINE
Payer: COMMERCIAL

## 2022-08-20 DIAGNOSIS — K52.9 GASTROENTERITIS: ICD-10-CM

## 2022-08-20 PROCEDURE — 87505 NFCT AGENT DETECTION GI: CPT

## 2022-08-21 LAB
CAMPYLOBACTER DNA SPEC NAA+PROBE: NORMAL
SALMONELLA DNA SPEC QL NAA+PROBE: NORMAL
SHIGA TOXIN STX GENE SPEC NAA+PROBE: NORMAL
SHIGELLA DNA SPEC QL NAA+PROBE: NORMAL

## 2022-08-23 ENCOUNTER — TELEPHONE (OUTPATIENT)
Dept: FAMILY MEDICINE CLINIC | Facility: CLINIC | Age: 67
End: 2022-08-23

## 2022-08-23 NOTE — TELEPHONE ENCOUNTER
Advised patient that stool culture is negative - has not been eating but is keeping hydrated - is taking the lomotil 4 x daily and seems to be helping - told patient contact us if no better and to ER if needed - patient understood

## 2022-09-06 DIAGNOSIS — K52.9 GASTROENTERITIS: ICD-10-CM

## 2022-09-06 RX ORDER — DIPHENOXYLATE HYDROCHLORIDE AND ATROPINE SULFATE 2.5; .025 MG/1; MG/1
1 TABLET ORAL 4 TIMES DAILY PRN
Qty: 30 TABLET | Refills: 2 | Status: SHIPPED | OUTPATIENT
Start: 2022-09-06

## 2022-09-12 DIAGNOSIS — E66.9 DIABETES MELLITUS TYPE 2 IN OBESE (HCC): ICD-10-CM

## 2022-09-12 DIAGNOSIS — E11.69 DIABETES MELLITUS TYPE 2 IN OBESE (HCC): ICD-10-CM

## 2022-09-14 RX ORDER — SITAGLIPTIN 50 MG/1
TABLET, FILM COATED ORAL
Qty: 90 TABLET | Refills: 3 | Status: SHIPPED | OUTPATIENT
Start: 2022-09-14

## 2022-10-20 DIAGNOSIS — E66.9 DIABETES MELLITUS TYPE 2 IN OBESE (HCC): ICD-10-CM

## 2022-10-20 DIAGNOSIS — I10 ESSENTIAL HYPERTENSION: ICD-10-CM

## 2022-10-20 DIAGNOSIS — E11.69 DIABETES MELLITUS TYPE 2 IN OBESE (HCC): ICD-10-CM

## 2022-10-20 RX ORDER — AMLODIPINE BESYLATE 5 MG/1
5 TABLET ORAL DAILY
Qty: 90 TABLET | Refills: 1 | Status: SHIPPED | OUTPATIENT
Start: 2022-10-20

## 2022-10-20 RX ORDER — METFORMIN HYDROCHLORIDE 500 MG/1
2000 TABLET, EXTENDED RELEASE ORAL
Qty: 360 TABLET | Refills: 1 | Status: SHIPPED | OUTPATIENT
Start: 2022-10-20 | End: 2023-04-18

## 2022-10-20 RX ORDER — CARVEDILOL 6.25 MG/1
6.25 TABLET ORAL 2 TIMES DAILY
Qty: 180 TABLET | Refills: 1 | Status: SHIPPED | OUTPATIENT
Start: 2022-10-20

## 2022-10-24 ENCOUNTER — APPOINTMENT (OUTPATIENT)
Dept: LAB | Facility: HOSPITAL | Age: 67
End: 2022-10-24
Attending: FAMILY MEDICINE
Payer: COMMERCIAL

## 2022-10-24 ENCOUNTER — APPOINTMENT (OUTPATIENT)
Dept: LAB | Facility: HOSPITAL | Age: 67
End: 2022-10-24
Payer: COMMERCIAL

## 2022-10-24 DIAGNOSIS — E11.69 DIABETES MELLITUS TYPE 2 IN OBESE (HCC): ICD-10-CM

## 2022-10-24 DIAGNOSIS — Z01.812 PRE-OPERATIVE LABORATORY EXAMINATION: ICD-10-CM

## 2022-10-24 DIAGNOSIS — E66.9 DIABETES MELLITUS TYPE 2 IN OBESE (HCC): ICD-10-CM

## 2022-10-24 LAB
ALBUMIN SERPL BCP-MCNC: 3.2 G/DL (ref 3.5–5)
ALP SERPL-CCNC: 80 U/L (ref 46–116)
ALT SERPL W P-5'-P-CCNC: 21 U/L (ref 12–78)
ANION GAP SERPL CALCULATED.3IONS-SCNC: 8 MMOL/L (ref 4–13)
APTT PPP: 29 SECONDS (ref 23–37)
AST SERPL W P-5'-P-CCNC: 18 U/L (ref 5–45)
ATRIAL RATE: 62 BPM
BASOPHILS # BLD AUTO: 0.01 THOUSANDS/ÂΜL (ref 0–0.1)
BASOPHILS NFR BLD AUTO: 0 % (ref 0–1)
BILIRUB SERPL-MCNC: 1 MG/DL (ref 0.2–1)
BILIRUB UR QL STRIP: NEGATIVE
BUN SERPL-MCNC: 12 MG/DL (ref 5–25)
CALCIUM ALBUM COR SERPL-MCNC: 9.8 MG/DL (ref 8.3–10.1)
CALCIUM SERPL-MCNC: 9.2 MG/DL (ref 8.3–10.1)
CHLORIDE SERPL-SCNC: 103 MMOL/L (ref 96–108)
CHOLEST SERPL-MCNC: 171 MG/DL
CLARITY UR: CLEAR
CO2 SERPL-SCNC: 27 MMOL/L (ref 21–32)
COLOR UR: NORMAL
CREAT SERPL-MCNC: 0.94 MG/DL (ref 0.6–1.3)
EOSINOPHIL # BLD AUTO: 0.1 THOUSAND/ÂΜL (ref 0–0.61)
EOSINOPHIL NFR BLD AUTO: 1 % (ref 0–6)
ERYTHROCYTE [DISTWIDTH] IN BLOOD BY AUTOMATED COUNT: 20.1 % (ref 11.6–15.1)
EST. AVERAGE GLUCOSE BLD GHB EST-MCNC: 137 MG/DL
GFR SERPL CREATININE-BSD FRML MDRD: 64 ML/MIN/1.73SQ M
GLUCOSE P FAST SERPL-MCNC: 129 MG/DL (ref 65–99)
GLUCOSE UR STRIP-MCNC: NEGATIVE MG/DL
HBA1C MFR BLD: 6.4 %
HCT VFR BLD AUTO: 28.6 % (ref 34.8–46.1)
HDLC SERPL-MCNC: 61 MG/DL
HGB BLD-MCNC: 8 G/DL (ref 11.5–15.4)
HGB UR QL STRIP.AUTO: NEGATIVE
IMM GRANULOCYTES # BLD AUTO: 0.02 THOUSAND/UL (ref 0–0.2)
IMM GRANULOCYTES NFR BLD AUTO: 0 % (ref 0–2)
INR PPP: 0.97 (ref 0.84–1.19)
KETONES UR STRIP-MCNC: NEGATIVE MG/DL
LDLC SERPL CALC-MCNC: 89 MG/DL (ref 0–100)
LEUKOCYTE ESTERASE UR QL STRIP: NEGATIVE
LYMPHOCYTES # BLD AUTO: 1.54 THOUSANDS/ÂΜL (ref 0.6–4.47)
LYMPHOCYTES NFR BLD AUTO: 20 % (ref 14–44)
MCH RBC QN AUTO: 19.6 PG (ref 26.8–34.3)
MCHC RBC AUTO-ENTMCNC: 28 G/DL (ref 31.4–37.4)
MCV RBC AUTO: 70 FL (ref 82–98)
MONOCYTES # BLD AUTO: 0.53 THOUSAND/ÂΜL (ref 0.17–1.22)
MONOCYTES NFR BLD AUTO: 7 % (ref 4–12)
NEUTROPHILS # BLD AUTO: 5.49 THOUSANDS/ÂΜL (ref 1.85–7.62)
NEUTS SEG NFR BLD AUTO: 72 % (ref 43–75)
NITRITE UR QL STRIP: NEGATIVE
NRBC BLD AUTO-RTO: 0 /100 WBCS
P AXIS: 53 DEGREES
PH UR STRIP.AUTO: 7 [PH]
PLATELET # BLD AUTO: 453 THOUSANDS/UL (ref 149–390)
PMV BLD AUTO: 9.3 FL (ref 8.9–12.7)
POTASSIUM SERPL-SCNC: 3.8 MMOL/L (ref 3.5–5.3)
PR INTERVAL: 154 MS
PROT SERPL-MCNC: 7.3 G/DL (ref 6.4–8.4)
PROT UR STRIP-MCNC: NEGATIVE MG/DL
PROTHROMBIN TIME: 13.1 SECONDS (ref 11.6–14.5)
QRS AXIS: -20 DEGREES
QRSD INTERVAL: 90 MS
QT INTERVAL: 440 MS
QTC INTERVAL: 446 MS
RBC # BLD AUTO: 4.09 MILLION/UL (ref 3.81–5.12)
SODIUM SERPL-SCNC: 138 MMOL/L (ref 135–147)
SP GR UR STRIP.AUTO: 1.01 (ref 1–1.03)
T WAVE AXIS: 28 DEGREES
TRIGL SERPL-MCNC: 106 MG/DL
TSH SERPL DL<=0.05 MIU/L-ACNC: 2.05 UIU/ML (ref 0.45–4.5)
UROBILINOGEN UR QL STRIP.AUTO: 1 E.U./DL
VENTRICULAR RATE: 62 BPM
WBC # BLD AUTO: 7.69 THOUSAND/UL (ref 4.31–10.16)

## 2022-10-24 PROCEDURE — 83036 HEMOGLOBIN GLYCOSYLATED A1C: CPT

## 2022-10-24 PROCEDURE — 85610 PROTHROMBIN TIME: CPT

## 2022-10-24 PROCEDURE — 85025 COMPLETE CBC W/AUTO DIFF WBC: CPT

## 2022-10-24 PROCEDURE — 84443 ASSAY THYROID STIM HORMONE: CPT

## 2022-10-24 PROCEDURE — 80053 COMPREHEN METABOLIC PANEL: CPT

## 2022-10-24 PROCEDURE — 36415 COLL VENOUS BLD VENIPUNCTURE: CPT

## 2022-10-24 PROCEDURE — 80061 LIPID PANEL: CPT

## 2022-10-24 PROCEDURE — 85730 THROMBOPLASTIN TIME PARTIAL: CPT

## 2022-10-26 ENCOUNTER — ANESTHESIA EVENT (OUTPATIENT)
Dept: PERIOP | Facility: HOSPITAL | Age: 67
End: 2022-10-26

## 2022-11-01 ENCOUNTER — OFFICE VISIT (OUTPATIENT)
Dept: FAMILY MEDICINE CLINIC | Facility: CLINIC | Age: 67
End: 2022-11-01

## 2022-11-01 VITALS
HEART RATE: 68 BPM | OXYGEN SATURATION: 96 % | DIASTOLIC BLOOD PRESSURE: 70 MMHG | SYSTOLIC BLOOD PRESSURE: 122 MMHG | BODY MASS INDEX: 37.18 KG/M2 | WEIGHT: 220 LBS

## 2022-11-01 DIAGNOSIS — E66.9 DIABETES MELLITUS TYPE 2 IN OBESE (HCC): Primary | ICD-10-CM

## 2022-11-01 DIAGNOSIS — Z78.9 STATIN INTOLERANCE: ICD-10-CM

## 2022-11-01 DIAGNOSIS — E03.9 ACQUIRED HYPOTHYROIDISM: ICD-10-CM

## 2022-11-01 DIAGNOSIS — I10 ESSENTIAL HYPERTENSION: ICD-10-CM

## 2022-11-01 DIAGNOSIS — E66.9 OBESITY (BMI 30-39.9): ICD-10-CM

## 2022-11-01 DIAGNOSIS — E11.69 DIABETES MELLITUS TYPE 2 IN OBESE (HCC): Primary | ICD-10-CM

## 2022-11-01 DIAGNOSIS — E78.5 DYSLIPIDEMIA: ICD-10-CM

## 2022-11-01 RX ORDER — LEVOTHYROXINE SODIUM 0.12 MG/1
125 TABLET ORAL DAILY
Qty: 90 TABLET | Refills: 1 | Status: SHIPPED | OUTPATIENT
Start: 2022-11-01

## 2022-11-01 RX ORDER — HYDROCHLOROTHIAZIDE 25 MG/1
25 TABLET ORAL DAILY
Qty: 90 TABLET | Refills: 1 | Status: SHIPPED | OUTPATIENT
Start: 2022-11-01

## 2022-11-01 RX ORDER — EZETIMIBE 10 MG/1
10 TABLET ORAL DAILY
Qty: 90 TABLET | Refills: 1 | Status: SHIPPED | OUTPATIENT
Start: 2022-11-01

## 2022-11-01 NOTE — PROGRESS NOTES
Assessment/Plan:  Post gastric bypass with iron deficiency anemia and thrombocytosis with a hemoglobin of 8    Diabetes mellitus well controlled with hemoglobin A1c of 5 4    Current cholesterol is 171 triglycerides are 106 HDL is 61 and LDL calculated at 89 post gastric bypass on Zetia 10 mg daily the patient states he is allergic to statins    Acquired hypothyroidism on levothyroxine 125 mcg euthyroid on current therapy    Problem List Items Addressed This Visit        Endocrine    Diabetes mellitus type 2 in obese ([facility] ) - Primary    Relevant Orders    Hemoglobin A1C    Comprehensive metabolic panel    CBC and differential      Other Visit Diagnoses     Acquired hypothyroidism        Relevant Medications    levothyroxine 125 mcg tablet    Other Relevant Orders    TSH, 3rd generation with Free T4 reflex    Essential hypertension        Relevant Medications    hydrochlorothiazide (HYDRODIURIL) 25 mg tablet    Dyslipidemia        Relevant Medications    ezetimibe (ZETIA) 10 mg tablet    Obesity (BMI 30-39  9)        Statin intolerance               Diagnoses and all orders for this visit:    Diabetes mellitus type 2 in obese ([facility] )  -     Hemoglobin A1C; Future  -     Comprehensive metabolic panel; Future  -     CBC and differential; Future    Acquired hypothyroidism  -     levothyroxine 125 mcg tablet; Take 1 tablet (125 mcg total) by mouth daily  -     TSH, 3rd generation with Free T4 reflex; Future    Essential hypertension  -     hydrochlorothiazide (HYDRODIURIL) 25 mg tablet; Take 1 tablet (25 mg total) by mouth daily    Dyslipidemia  -     ezetimibe (ZETIA) 10 mg tablet; Take 1 tablet (10 mg total) by mouth daily    Obesity (BMI 30-39  9)    Statin intolerance      No problem-specific Assessment & Plan notes found for this encounter  PHQ-2/9 Depression Screening            Body mass index is 37 18 kg/m²  BMI Counseling: Body mass index is 37 18 kg/m²   The BMI     Subjective:      Patient ID: Corey  Dora MIO [place] Patient Age: 79 year old  MESSAGE: Interpreting service used: No      IM/FP- Appointment - Memory Clinic     Kurt, spouse has verbal states pt is scheduled on 7/10/20 and they need to cancel this appointment as pt is in Rehab.  Kurt states he is not allowed to go into the facility and there is no way pt will be able to do a video visit    Message confirmed with caller  Message routed to [facility]          WEIGHT AND HEIGHT:   Wt Readings from Last 1 Encounters:   06/18/20 61.1 kg (134 lb 11.2 oz)     Ht Readings from Last 1 Encounters:   06/18/20 5' 2\" (1.575 m)     BMI Readings from Last 1 Encounters:   06/18/20 24.64 kg/m²       ALLERGIES:  Sulfa antibiotics and Cephalexin  Current Outpatient Medications   Medication   • lovastatin (MEVACOR) 20 MG tablet   • lovastatin (MEVACOR) 10 MG tablet   • methylpheniDATE (RITALIN) 10 MG tablet   • venlafaxine XR (EFFEXOR XR) 75 MG 24 hr capsule   • docusate sodium (COLACE) 100 MG capsule   • acetaminophen (TYLENOL) 325 MG tablet   • Calcium 600-200 MG-UNIT Tab   • levothyroxine (SYNTHROID, LEVOTHROID) 50 MCG tablet     No current facility-administered medications for this visit.      PHARMACY to use:            Pharmacy preference(s) on file:   [id number] - [place], [place] - [address]  [address] [place] [id number]  Phone: [phone] Fax: [phone]      CALL BACK INFO: Ok to leave response (including medical information) on answering machine  ROUTING: Patient's physician/staff        PCP: Zahida Morocho MD         INS: Payor: [facility] MEDICARE ADVANTAGE / Plan: [facility] MEDICARE ADVANTAGE [id number] / Product Type: MEDADV   PATIENT ADDRESS:  [address] [place] [id number]       Arin Machado is a 61 y o  female  Patient presents for four-month checkup on diabetes mellitus      The following portions of the patient's history were reviewed and updated as appropriate:   She has a past medical history of Hyperlipidemia, Hypertension, Hypothyroidism, Morbid obesity ([address] ), Pancreatitis (09/1997), Sleep apnea, Type 2 diabetes mellitus ([facility] ), and Vitamin D deficiency  ,  does not have any pertinent problems on file  ,   has a past surgical history that includes Gastric bypass (05/2013); Cholecystectomy (04/2013); Total knee arthroplasty (Left, 2013); Mammo (historical) (Bilateral, 12/2016); Breast excisional biopsy (Left, 2001); and Breast excisional biopsy (Left, 07/2001)  ,  family history includes Brain cancer in her mother; Breast cancer in her maternal aunt, maternal aunt, and mother; Cancer in her paternal aunt, paternal aunt, paternal aunt, paternal grandfather, and paternal grandmother; Heart attack in her father; Lung cancer in her mother and paternal aunt; No Known Problems in her daughter, sister, sister, sister, and sister; Stroke in her sister  ,   reports that she has never smoked  She has never used smokeless tobacco  She reports previous alcohol use  She reports that she does not use drugs  ,  is allergic to aspirin and nsaids     Current Outpatient Medications   Medication Sig Dispense Refill   • amLODIPine (NORVASC) 5 mg tablet Take 1 tablet (5 mg total) by mouth daily 90 tablet 1   • carvedilol (COREG) 6 25 mg tablet Take 1 tablet (6 25 mg total) by mouth 2 (two) times a day 180 tablet 1   • Cyanocobalamin (VITAMIN B-12 PO) Take by mouth     • ergocalciferol (VITAMIN D2) 50,000 units Take 1 capsule (50,000 Units total) by mouth once a week 12 capsule 2   • ezetimibe (ZETIA) 10 mg tablet Take 1 tablet (10 mg total) by mouth daily 90 tablet 1   • hydrochlorothiazide (HYDRODIURIL) 25 mg tablet Take 1 tablet (25 mg total) by mouth daily 90 tablet 1   • Januvia 50 MG tablet take 1 tablet by mouth once daily 90 tablet 3   • levothyroxine 125 mcg tablet Take 1 tablet (125 mcg total) by mouth daily 90 tablet 1   • metFORMIN (GLUCOPHAGE-XR) 500 mg 24 hr tablet Take 4 tablets (2,000 mg total) by mouth daily with breakfast 360 tablet 1   • Blood Glucose Monitoring Suppl ([employer] Blood Glucose Monitor) CARLOS Use in the morning 1 each 0   • diphenoxylate-atropine (LOMOTIL) 2 5-0 025 mg per tablet Take 1 tablet by mouth 4 (four) times a day as needed for diarrhea 30 tablet 2   • glucose blood test strip Use 1 each daily Use as instructed 100 strip 11   • Lancets MISC Use daily 100 each 11   • saccharomyces boulardii (FLORASTOR) 250 mg capsule Take 1 capsule (250 mg total) by mouth 2 (two) times a day 60 capsule 1     No current facility-administered medications for this visit  Review of Systems   Constitutional: Positive for fatigue  Negative for chills and fever  HENT: Negative for ear pain and sore throat  Eyes: Negative for pain and visual disturbance  Respiratory: Negative for cough and shortness of breath  Cardiovascular: Negative for chest pain and palpitations  Gastrointestinal: Negative for abdominal pain and vomiting  Genitourinary: Negative for dysuria and hematuria  Musculoskeletal: Negative for arthralgias and back pain  Skin: Negative for color change and rash  Neurological: Negative for seizures and syncope  All other systems reviewed and are negative  Objective:    /70 (BP Location: Left arm, Patient Position: Sitting, Cuff Size: Standard) Comment: lower arm  Pulse 68   Wt 99 8 kg (220 lb) Comment: at home per patient  SpO2 96%   BMI 37 18 kg/m²   Body mass index is 37 18 kg/m²  Physical Exam  Constitutional:       Appearance: She is well-developed  HENT:      Head: Normocephalic  Eyes:      Pupils: Pupils are equal, round, and reactive to light  Cardiovascular:      Rate and Rhythm: Normal rate and regular rhythm        Pulses: no weak pulses Dorsalis pedis pulses are 2+ on the right side and 2+ on the left side  Posterior tibial pulses are 2+ on the right side and 2+ on the left side  Heart sounds: Normal heart sounds  Pulmonary:      Effort: Pulmonary effort is normal       Breath sounds: Normal breath sounds  Abdominal:      General: Bowel sounds are normal       Palpations: Abdomen is soft  Tenderness: There is no abdominal tenderness  Musculoskeletal:      Cervical back: Normal range of motion  Feet:      Right foot:      Skin integrity: No ulcer, skin breakdown, erythema, warmth, callus or dry skin  Left foot:      Skin integrity: No ulcer, skin breakdown, erythema, warmth, callus or dry skin  Skin:     General: Skin is warm  Neurological:      Mental Status: She is alert and oriented to person, place, and time  Diabetic Foot Exam    Patient's shoes and socks removed  Right Foot/Ankle   Right Foot Inspection  Skin Exam: skin normal and skin intact  No dry skin, no warmth, no callus, no erythema, no maceration, no abnormal color, no pre-ulcer, no ulcer and no callus  Toe Exam: ROM and strength within normal limits  Sensory   Vibration: intact  Proprioception: intact  Monofilament testing: intact    Vascular  Capillary refills: < 3 seconds  The right DP pulse is 2+  The right PT pulse is 2+  Left Foot/Ankle  Left Foot Inspection  Skin Exam: skin normal and skin intact  No dry skin, no warmth, no erythema, no maceration, normal color, no pre-ulcer, no ulcer and no callus  Toe Exam: ROM and strength within normal limits  Sensory   Vibration: intact  Proprioception: intact  Monofilament testing: intact    Vascular  Capillary refills: < 3 seconds  The left DP pulse is 2+  The left PT pulse is 2+       Assign Risk Category  No deformity present  No loss of protective sensation  No weak pulses  Risk: 0

## 2022-11-03 RX ORDER — VITAMIN B COMPLEX
1 CAPSULE ORAL DAILY
COMMUNITY

## 2022-11-03 RX ORDER — MULTIVITAMIN
1 TABLET ORAL DAILY
COMMUNITY

## 2022-11-03 NOTE — PRE-PROCEDURE INSTRUCTIONS
Pre-Surgery Instructions:   Medication Instructions   • amLODIPine (NORVASC) 5 mg tablet Take day of surgery  • b complex vitamins capsule Hold day of surgery  • Blood Glucose Monitoring Suppl ([employer] Blood Glucose Monitor) CARLOS      • carvedilol (COREG) 6 25 mg tablet Take day of surgery  • Cyanocobalamin (VITAMIN B-12 PO) Uses PRN- OK to take day of surgery   • diphenoxylate-atropine (LOMOTIL) 2 5-0 025 mg per tablet Stop taking 7 days prior to surgery  • ergocalciferol (VITAMIN D2) 50,000 units Take day of surgery  • ezetimibe (ZETIA) 10 mg tablet Hold day of surgery  • hydrochlorothiazide (HYDRODIURIL) 25 mg tablet Hold day of surgery  • Januvia 50 MG tablet Hold day of surgery  • levothyroxine 125 mcg tablet Take day of surgery  • metFORMIN (GLUCOPHAGE-XR) 500 mg 24 hr tablet Hold day of surgery  • Multiple Vitamin (multivitamin) tablet Stop taking 7 days prior to surgery  INSTR ON PAKO CALL,  REPORT LOC , BRING PHOTO ID/MED LIST/INS  INFO ,SHOWER REV , STOP ASA/NSAID/VIT 7 DAY PREOP, PT VERBALIZES UNDERSTANDING W/ NO FURTHER QUESTIONS

## 2022-11-08 PROBLEM — E03.9 HYPOTHYROIDISM: Status: ACTIVE | Noted: 2022-11-08

## 2022-11-08 PROBLEM — I10 HTN (HYPERTENSION): Status: ACTIVE | Noted: 2022-11-08

## 2022-11-08 PROBLEM — E78.5 HYPERLIPIDEMIA: Status: ACTIVE | Noted: 2022-11-08

## 2022-11-08 PROBLEM — G47.30 SLEEP APNEA: Status: ACTIVE | Noted: 2022-11-08

## 2022-11-08 NOTE — H&P
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    Plan:  Circumferential abdominal plasty    History of Present Illness   This is a 27-year-old female who has maximum weight was approximately 320 lb had gastric bypass surgery approximately 10 years ago and lost approximately 100 lb patient has multiple folds of redundant skin the fat that hang down over the suprapubic area there is a chronic intertriginous rash in the suprapubic fold  Patient has a large abdominal pannus the pulls on her lower back and patient experiences for back pain  This is ameliorated by elevating pannus  Review of Systems   All other systems reviewed and are negative        Historical Information   Past Medical History:   Diagnosis Date   • Hyperlipidemia    • Hypertension    • Hypothyroidism    • Morbid obesity ([facility] [address] )    • Pancreatitis 09/1997   • Sleep apnea     no cpap machine used   • Type 2 diabetes mellitus ([email or web address] )    • Vitamin D deficiency      Past Surgical History:   Procedure Laterality Date   • BREAST EXCISIONAL BIOPSY Left 2001    benign    • BREAST EXCISIONAL BIOPSY Left 07/2001   • CHOLECYSTECTOMY  04/2013   • COLONOSCOPY     • EGD     • GASTRIC BYPASS  05/2013   • MAMMO (HISTORICAL) Bilateral 12/2016    [facility]   • TONSILLECTOMY     • TOTAL KNEE ARTHROPLASTY Left 2013     Social History   Social History     Substance and Sexual Activity   Alcohol Use Yes    Comment: social     Social History     Substance and Sexual Activity   Drug Use Never     Social History     Tobacco Use   Smoking Status Never Smoker   Smokeless Tobacco Never Used     E-Cigarette Use: Never User     E-Cigarette/Vaping Substances   • Nicotine No    • THC No    • CBD No    • Flavoring No    • Other No    • Unknown No        Family History: non-contributory    Meds/Allergies   all medications and allergies reviewed  Allergies Allergen Reactions   • Aspirin Angioedema   • Codeine Angioedema   • Nsaids Angioedema   • Statins Other (See Comments) and Angioedema     Patient states allergic to statins   • Wound Dressing Adhesive Rash       Objective   First Vitals:        Current Vitals:        No intake or output data in the 24 hours ending 11/08/22 1722    Invasive Devices  Report    None                 Physical Exam examination head ears eyes nose and throat is within normal limits heart sounds S1-S2 heard no murmurs or gallops lungs clear abdomen soft extremities with normal limits abdomen is large pendulous there is a area of skin breakdown in the suprapubic fold      Lab Results:   Imaging:   EKG, Pathology, and Other Studies:     Code Status: No Order  Advance Directive and Living Will:      Power of :    POLST:      Counseling / Coordination of Care:   None

## 2022-11-09 ENCOUNTER — ANESTHESIA (OUTPATIENT)
Dept: PERIOP | Facility: HOSPITAL | Age: 67
End: 2022-11-09

## 2022-11-09 ENCOUNTER — HOSPITAL ENCOUNTER (OUTPATIENT)
Facility: HOSPITAL | Age: 67
Setting detail: OUTPATIENT SURGERY
Discharge: HOME | End: 2022-11-09
Attending: PLASTIC SURGERY | Admitting: PLASTIC SURGERY

## 2022-11-09 VITALS
RESPIRATION RATE: 18 BRPM | WEIGHT: 217 LBS | HEART RATE: 70 BPM | TEMPERATURE: 96 F | HEIGHT: 65 IN | BODY MASS INDEX: 36.15 KG/M2 | DIASTOLIC BLOOD PRESSURE: 71 MMHG | OXYGEN SATURATION: 96 % | SYSTOLIC BLOOD PRESSURE: 120 MMHG

## 2022-11-09 PROBLEM — E88.1 LIPODYSTROPHY: Status: ACTIVE | Noted: 2022-11-09

## 2022-11-09 LAB
BASE EXCESS BLDA CALC-SCNC: 3 MMOL/L (ref -2–3)
CA-I BLD-SCNC: 1.29 MMOL/L (ref 1.12–1.32)
GLUCOSE SERPL-MCNC: 135 MG/DL (ref 65–140)
GLUCOSE SERPL-MCNC: 138 MG/DL (ref 65–140)
GLUCOSE SERPL-MCNC: 147 MG/DL (ref 65–140)
HCO3 BLDA-SCNC: 26.7 MMOL/L (ref 24–30)
HCT VFR BLD AUTO: 25.9 % (ref 34.8–46.1)
HCT VFR BLD CALC: 27 % (ref 34.8–46.1)
HGB BLD-MCNC: 7.2 G/DL (ref 11.5–15.4)
HGB BLDA-MCNC: 9.2 G/DL (ref 11.5–15.4)
PCO2 BLD: 28 MMOL/L (ref 21–32)
PCO2 BLD: 38.4 MM HG (ref 42–50)
PH BLD: 7.45 [PH] (ref 7.3–7.4)
PO2 BLD: 23 MM HG (ref 35–45)
POTASSIUM BLD-SCNC: 4.2 MMOL/L (ref 3.5–5.3)
SAO2 % BLD FROM PO2: 43 % (ref 60–85)
SODIUM BLD-SCNC: 139 MMOL/L (ref 136–145)
SPECIMEN SOURCE: ABNORMAL

## 2022-11-09 RX ORDER — ONDANSETRON 2 MG/ML
4 INJECTION INTRAMUSCULAR; INTRAVENOUS ONCE AS NEEDED
Status: DISCONTINUED | OUTPATIENT
Start: 2022-11-09 | End: 2022-11-09 | Stop reason: HOSPADM

## 2022-11-09 RX ORDER — MIDAZOLAM HYDROCHLORIDE 2 MG/2ML
INJECTION, SOLUTION INTRAMUSCULAR; INTRAVENOUS AS NEEDED
Status: DISCONTINUED | OUTPATIENT
Start: 2022-11-09 | End: 2022-11-09

## 2022-11-09 RX ORDER — PROPOFOL 10 MG/ML
INJECTION, EMULSION INTRAVENOUS AS NEEDED
Status: DISCONTINUED | OUTPATIENT
Start: 2022-11-09 | End: 2022-11-09

## 2022-11-09 RX ORDER — KETAMINE HCL IN NACL, ISO-OSM 100MG/10ML
SYRINGE (ML) INJECTION AS NEEDED
Status: DISCONTINUED | OUTPATIENT
Start: 2022-11-09 | End: 2022-11-09

## 2022-11-09 RX ORDER — SODIUM CHLORIDE, SODIUM LACTATE, POTASSIUM CHLORIDE, CALCIUM CHLORIDE 600; 310; 30; 20 MG/100ML; MG/100ML; MG/100ML; MG/100ML
INJECTION, SOLUTION INTRAVENOUS CONTINUOUS PRN
Status: DISCONTINUED | OUTPATIENT
Start: 2022-11-09 | End: 2022-11-09

## 2022-11-09 RX ORDER — ROCURONIUM BROMIDE 10 MG/ML
INJECTION, SOLUTION INTRAVENOUS AS NEEDED
Status: DISCONTINUED | OUTPATIENT
Start: 2022-11-09 | End: 2022-11-09

## 2022-11-09 RX ORDER — ONDANSETRON 2 MG/ML
INJECTION INTRAMUSCULAR; INTRAVENOUS AS NEEDED
Status: DISCONTINUED | OUTPATIENT
Start: 2022-11-09 | End: 2022-11-09

## 2022-11-09 RX ORDER — LIDOCAINE HYDROCHLORIDE 10 MG/ML
INJECTION, SOLUTION EPIDURAL; INFILTRATION; INTRACAUDAL; PERINEURAL AS NEEDED
Status: DISCONTINUED | OUTPATIENT
Start: 2022-11-09 | End: 2022-11-09

## 2022-11-09 RX ORDER — DEXMEDETOMIDINE HYDROCHLORIDE 100 UG/ML
INJECTION, SOLUTION INTRAVENOUS AS NEEDED
Status: DISCONTINUED | OUTPATIENT
Start: 2022-11-09 | End: 2022-11-09

## 2022-11-09 RX ORDER — FENTANYL CITRATE/PF 50 MCG/ML
25 SYRINGE (ML) INJECTION
Status: DISCONTINUED | OUTPATIENT
Start: 2022-11-09 | End: 2022-11-09 | Stop reason: HOSPADM

## 2022-11-09 RX ORDER — PROPOFOL 10 MG/ML
INJECTION, EMULSION INTRAVENOUS CONTINUOUS PRN
Status: DISCONTINUED | OUTPATIENT
Start: 2022-11-09 | End: 2022-11-09

## 2022-11-09 RX ORDER — SCOLOPAMINE TRANSDERMAL SYSTEM 1 MG/1
1 PATCH, EXTENDED RELEASE TRANSDERMAL
Status: DISCONTINUED | OUTPATIENT
Start: 2022-11-09 | End: 2022-11-09 | Stop reason: HOSPADM

## 2022-11-09 RX ORDER — CEFAZOLIN SODIUM 2 G/50ML
2000 SOLUTION INTRAVENOUS ONCE
Status: COMPLETED | OUTPATIENT
Start: 2022-11-09 | End: 2022-11-09

## 2022-11-09 RX ADMIN — SODIUM CHLORIDE, SODIUM LACTATE, POTASSIUM CHLORIDE, AND CALCIUM CHLORIDE: .6; .31; .03; .02 INJECTION, SOLUTION INTRAVENOUS at 08:58

## 2022-11-09 RX ADMIN — CEFAZOLIN SODIUM 2000 MG: 2 SOLUTION INTRAVENOUS at 09:59

## 2022-11-09 RX ADMIN — Medication 30 MG: at 10:19

## 2022-11-09 RX ADMIN — ROCURONIUM BROMIDE 50 MG: 10 INJECTION INTRAVENOUS at 09:54

## 2022-11-09 RX ADMIN — SCOPALAMINE 1 PATCH: 1 PATCH, EXTENDED RELEASE TRANSDERMAL at 08:19

## 2022-11-09 RX ADMIN — ONDANSETRON 4 MG: 2 INJECTION INTRAMUSCULAR; INTRAVENOUS at 10:37

## 2022-11-09 RX ADMIN — LIDOCAINE HYDROCHLORIDE 50 MG: 10 INJECTION, SOLUTION EPIDURAL; INFILTRATION; INTRACAUDAL; PERINEURAL at 09:54

## 2022-11-09 RX ADMIN — SODIUM CHLORIDE, SODIUM LACTATE, POTASSIUM CHLORIDE, AND CALCIUM CHLORIDE: .6; .31; .03; .02 INJECTION, SOLUTION INTRAVENOUS at 10:27

## 2022-11-09 RX ADMIN — SUGAMMADEX 400 MG: 100 INJECTION, SOLUTION INTRAVENOUS at 10:42

## 2022-11-09 RX ADMIN — PROPOFOL 50 MCG/KG/MIN: 10 INJECTION, EMULSION INTRAVENOUS at 10:19

## 2022-11-09 RX ADMIN — PROPOFOL 200 MG: 10 INJECTION, EMULSION INTRAVENOUS at 09:54

## 2022-11-09 RX ADMIN — MIDAZOLAM 1 MG: 1 INJECTION INTRAMUSCULAR; INTRAVENOUS at 09:50

## 2022-11-09 RX ADMIN — SODIUM CHLORIDE, SODIUM LACTATE, POTASSIUM CHLORIDE, CALCIUM CHLORIDE: 600; 310; 30; 20 INJECTION, SOLUTION INTRAVENOUS at 10:10

## 2022-11-09 RX ADMIN — MIDAZOLAM 1 MG: 1 INJECTION INTRAMUSCULAR; INTRAVENOUS at 09:45

## 2022-11-09 RX ADMIN — DEXMEDETOMIDINE HYDROCHLORIDE 8 MCG: 100 INJECTION, SOLUTION INTRAVENOUS at 10:26

## 2022-11-09 NOTE — ANESTHESIA POSTPROCEDURE EVALUATION
Post-Op Assessment Note    CV Status:  Stable  Pain Score: 0    Pain management: adequate     Mental Status:  Alert and awake   Hydration Status:  Euvolemic   PONV Controlled:  Controlled   Airway Patency:  Patent      Post Op Vitals Reviewed: Yes      Staff: CRNA         No complications documented      /75 (11/09/22 1054)    Temp (!) 97 °F (36 1 °C) (11/09/22 1054)    Pulse 65 (11/09/22 1054)   Resp 16 (11/09/22 1054)    SpO2 100 % (11/09/22 1054)

## 2022-11-09 NOTE — DISCHARGE INSTRUCTIONS
Patient to follow-up with her PCP regarding low hemoglobin and chronic anemia  Patient will be followed by me and office once her hemoglobin is corrected

## 2022-11-09 NOTE — OP NOTE
OPERATIVE REPORT  PATIENT NAME: Mirlande Hay    :  1955  MRN: [id number]  Pt Location:  OR ROOM 07    SURGERY DATE: 2022    Surgeon(s) and Role:     * Lenny Uribe MD - Primary    Preop Diagnosis:  Encounter for cosmetic surgery [Z41 1]  Lipodystrophy [E88 1]    Post-Op Diagnosis Codes:     * Encounter for cosmetic surgery [Z41 1]     * Lipodystrophy [E88 1]    Procedure(s) (LRB):  BODY-LIFT (N/A)    Specimen(s):  * No specimens in log *    Estimated Blood Loss:   Minimal    Drains:  [REMOVED] Urethral Catheter Latex 14 Fr   (Removed)   Number of days: 0       Anesthesia Type:   General    Operative Indications:  Encounter for cosmetic surgery [Z41 1]  Lipodystrophy [E88 1]      Operative Findings:  No surgery performed or started    Complications:   None  No surgery performed or startedProcedure and Technique:   I was present for the entire procedure    Patient Disposition:  PACU         SIGNATURE: Lenny Uribe MD  DATE: 2022  TIME: 10:51 AM

## 2022-11-09 NOTE — PROGRESS NOTES
Progress Note - Kasey Santiago 61 y o  female MRN: [id number]    Unit/Bed#: OR POOL Encounter: [id number]      Assessment:  Lipodystrophy of the abdomen, anemia    Plan:  No surgery performed    Subjective:   No complaints    Objective:  Hemoglobin today 7 2 hematocrit today 25  Vitals: Blood pressure 146/75, pulse 65, temperature (!) 97 °F (36 1 °C), resp  rate 16, height 5' 4 5" (1 638 m), weight 98 4 kg (217 lb), SpO2 100 %  ,Body mass index is 36 67 kg/m²  Intake/Output Summary (Last 24 hours) at 11/9/2022 1106  Last data filed at 11/9/2022 1038  Gross per 24 hour   Intake 1000 ml   Output 150 ml   Net 850 ml       Physical Exam:,  examination of the head ears eyes nose and throat is within normal limits heart sounds S1-S2 heard no murmurs or gallops lungs clear abdomen soft extremities within normal limits  Invasive Devices  Report    Peripheral Intravenous Line  Duration           Peripheral IV 11/09/22 Left;Ventral (anterior) Forearm <1 day    Peripheral IV 11/09/22 Right Hand <1 day              Patient was brought to the operating room after being marked in the holding area  The patient's hemoglobin that was drawn on 10/24 22 was 8, a fingerstick done in the holding area was 9 2  Patient was brought to the operating room draped and prepped in normal sterile fashion after general endotracheal anesthesia  A hemoglobin was drawn and sent to lab prior to starting the surgery  This revealed hemoglobin 7 2  Surgery was not started and the case was terminated  There were no  Complications patient's vital signs remained stable  Patient will be sent to her PCP for follow-up and evaluation of most likely a chronic anemia secondary to Hima-en-Y gastric bypass in 2013  Of note the patient has never had an iron infusion, and her hemoglobin remained stable at approximately 12 for years after her gastric bypass surgery  Patient currently is on AB complex with iron    Lab, Imaging and other studies: I have personally reviewed pertinent reports      VTE Pharmacologic Prophylaxis: Sequential compression device (Venodyne)   VTE Mechanical Prophylaxis: sequential compression device

## 2022-11-09 NOTE — ANESTHESIA PREPROCEDURE EVALUATION
Procedure:  BODY-LIFT (N/A Abdomen)    Relevant Problems   CARDIO   (+) HTN (hypertension)   (+) Hyperlipidemia      ENDO   (+) Diabetes mellitus type 2 in obese (HCC)   (+) Hypothyroidism      PULMONARY   (+) Sleep apnea      Other   (+) Morbid obesity (HCC)        Physical Exam    Airway    Mallampati score: II  TM Distance: >3 FB  Neck ROM: full     Dental       Cardiovascular  Cardiovascular exam normal    Pulmonary  Pulmonary exam normal     Other Findings        Anesthesia Plan  ASA Score- 2     Anesthesia Type- general with ASA Monitors  Additional Monitors:   Airway Plan: ETT  Plan Factors-Exercise tolerance (METS): >4 METS  Chart reviewed  EKG reviewed  Existing labs reviewed  Patient summary reviewed  Patient is not a current smoker  Patient not instructed to abstain from smoking on day of procedure  Patient did not smoke on day of surgery  Induction- intravenous  Postoperative Plan- Plan for postoperative opioid use  Informed Consent- Anesthetic plan and risks discussed with patient and spouse  I personally reviewed this patient with the CRNA  Discussed and agreed on the Anesthesia Plan with the CRNA             Lab Results   Component Value Date    HGBA1C 6 4 (H) 10/24/2022       Lab Results   Component Value Date     12/19/2015    K 3 8 10/24/2022     10/24/2022    CO2 27 10/24/2022    ANIONGAP 9 12/19/2015    BUN 12 10/24/2022    CREATININE 0 94 10/24/2022    GLUCOSE 144 (H) 12/19/2015    GLUF 129 (H) 10/24/2022    CALCIUM 9 2 10/24/2022    CORRECTEDCA 9 8 10/24/2022    AST 18 10/24/2022    ALT 21 10/24/2022    ALKPHOS 80 10/24/2022    PROT 7 1 12/19/2015    BILITOT 1 24 (H) 12/19/2015    EGFR 64 10/24/2022       Lab Results   Component Value Date    WBC 7 69 10/24/2022    HGB 8 0 (L) 10/24/2022    HCT 28 6 (L) 10/24/2022    MCV 70 (L) 10/24/2022     (H) 10/24/2022   Normal sinus rhythm  Possible Left atrial enlargement  Low voltage QRS  Borderline ECG  When compared with ECG of 02-DEC-2020 13:31,  No significant change was found  Confirmed by Alysia Mariee ([id number]) on 10/24/2022 9:16:17 AM

## 2022-11-11 ENCOUNTER — OFFICE VISIT (OUTPATIENT)
Dept: FAMILY MEDICINE CLINIC | Facility: CLINIC | Age: 67
End: 2022-11-11

## 2022-11-11 ENCOUNTER — APPOINTMENT (OUTPATIENT)
Dept: LAB | Facility: HOSPITAL | Age: 67
End: 2022-11-11
Attending: FAMILY MEDICINE

## 2022-11-11 VITALS — OXYGEN SATURATION: 98 % | HEART RATE: 81 BPM | DIASTOLIC BLOOD PRESSURE: 60 MMHG | SYSTOLIC BLOOD PRESSURE: 124 MMHG

## 2022-11-11 DIAGNOSIS — D50.9 IRON DEFICIENCY ANEMIA, UNSPECIFIED IRON DEFICIENCY ANEMIA TYPE: Primary | ICD-10-CM

## 2022-11-11 DIAGNOSIS — D50.9 IRON DEFICIENCY ANEMIA, UNSPECIFIED IRON DEFICIENCY ANEMIA TYPE: ICD-10-CM

## 2022-11-11 LAB
FERRITIN SERPL-MCNC: 3 NG/ML (ref 8–388)
IRON SATN MFR SERPL: 3 % (ref 15–50)
IRON SERPL-MCNC: 14 UG/DL (ref 50–170)
TIBC SERPL-MCNC: 449 UG/DL (ref 250–450)

## 2022-11-11 NOTE — PROGRESS NOTES
Assessment/Plan:  Iron deficiency anemia possibly due to malabsorption post gastric bypass with a hemoglobin of 7 2  Will refer to hematology oncology  The patient has had upper endoscopy approximately 2 months ago  the patient states that nothing was found the were no abnormalities  The patient had colonoscopy in 2017 was told she did need repeat colonoscopy for 10 years  Hypothyroidism on thyroid replacement a levothyroxine 125 mcg daily    Diabetes mellitus type 2 on metformin XR 2000 mg daily and Januvia 50 mg daily hemoglobin A1c  was 6 4    Hyperlipidemia treated with Zetia 10 mg    Essential hypertension treated with HydroDIURIL 25 mg Coreg 6 25 in [address]  [facility]  Outside Information             SURG    Component 1 mo ago Comments   Surgical Pathology    [facility] [address]   [address]   [place], [address]        PATHOLOGY REPORT       Patient: Gray Gonzalez  Provider:     /Sex: 1955 (Age: 61)   Cheryl Johnson Address: [address] [place] [address] [address] [facility] [address] Phone: [phone]    Acct/ MRN: [de-identified] /   [de-identified] Location: [id number] [address] [place] [address] [address]   Date: 2022 Receive Date: 2022   Copy to: Jordan Coyle     [id number]     DIAGNOSIS :     A  JEJUNAL BIOPSY:   No significant histopathologic abnormalities  Diagnostic features of celiac disease are not identified  Best Block for Additional Studies:  [N/A]         Electronically Released by LATHA Casillas  for XA Pathologist      [place]  10/12/2022   Finalized:  10/12/2022   Billing Fee Codes:A: [facility]:[id number]     Clinical History & Preoperative Diagnosis:   Pre-Op: Gastroesophageal reflux disease without esophagitis (K21 9); Iron deficiency anemia, unspecified (D50 9)     Post-Op:  TREVOR-EN     Specimen dictation/rule out's: Rule out sprue     GROSS DESCRIPTION:   A   The specimen is submitted in a container labeled with the patient's   name and date of birth and identified as jejunum biopsy on the   accompanying protocol and jejunum biopsy on the specimen container   The   specimen is submitted in formalin and consists of three tan, irregular   soft tissue fragments ranging from 0 3-0 6 cm in greatest dimension  The entire specimen is submitted in one cassette   Cold Ischemia Time:   No formalin time provided  [id number]  9/28/2022   LF     Electronically Released at [address] [facility], [address] [facility], [address] [facility]   : Ham Lanza MD   Ordering Provider: Jah Mauricio   Report Copied to : Terry Valera   Resulting Agency [facility] ([facility])    Specimen Collected: 09/27/22 12:00 AM Last Resulted: 10/12/22 10:12 PM   Received From: [address] [place] [address]  Result Received: 10/24/22  9:09 AM    View Encounter           Problem List Items Addressed This Visit    None     Visit Diagnoses     Iron deficiency anemia, unspecified iron deficiency anemia type    -  Primary    Relevant Orders    Ambulatory Referral to Hematology / Oncology           Diagnoses and all orders for this visit:    Iron deficiency anemia, unspecified iron deficiency anemia type  -     Cancel: Ambulatory Referral to Hematology / Oncology; Future  -     Ambulatory Referral to Hematology / Oncology; Future        No problem-specific Assessment & Plan notes found for this encounter  PHQ-2/9 Depression Screening            There is no height or weight on file to calculate BMI  BMI Counseling: There is no height or weight on file to calculate BMI  The BMI     Subjective:      Patient ID: Oseas Ugarte is a 61 y o  female  Patient presents with anemia  Has a history of a gastric bypass in 2013 was about to undergo surgery when it was noted that her hemoglobin had dropped to 7 2 with the surgery was canceled        The following portions of the patient's history were reviewed and updated as appropriate:   She has a past medical history of Hyperlipidemia, Hypertension, Hypothyroidism, Morbid obesity ([facility] [address] ), Pancreatitis (09/1997), Sleep apnea, Type 2 diabetes mellitus ([facility] [address] ), and Vitamin D deficiency  ,  does not have any pertinent problems on file  ,   has a past surgical history that includes Gastric bypass (05/2013); Cholecystectomy (04/2013); Total knee arthroplasty (Left, 2013); Mammo (historical) (Bilateral, 12/2016); Breast excisional biopsy (Left, 2001); Breast excisional biopsy (Left, 07/2001); Colonoscopy; EGD; and Tonsillectomy  ,  family history includes Brain cancer in her mother; Breast cancer in her maternal aunt, maternal aunt, and mother; Cancer in her paternal aunt, paternal aunt, paternal aunt, paternal grandfather, and paternal grandmother; Heart attack in her father; Lung cancer in her mother and paternal aunt; No Known Problems in her daughter, sister, sister, sister, and sister; Stroke in her sister  ,   reports that she has never smoked  She has never used smokeless tobacco  She reports current alcohol use  She reports that she does not use drugs  ,  is allergic to aspirin, codeine, nsaids, statins, and wound dressing adhesive     Current Outpatient Medications   Medication Sig Dispense Refill   • amLODIPine (NORVASC) 5 mg tablet Take 1 tablet (5 mg total) by mouth daily 90 tablet 1   • b complex vitamins capsule Take 1 capsule by mouth daily     • Blood Glucose Monitoring Suppl (Beroomersace Blood Glucose Monitor) CARLOS Use in the morning 1 each 0   • carvedilol (COREG) 6 25 mg tablet Take 1 tablet (6 25 mg total) by mouth 2 (two) times a day 180 tablet 1   • Cyanocobalamin (VITAMIN B-12 PO) Take 1 capsule by mouth daily     • diphenoxylate-atropine (LOMOTIL) 2 5-0 025 mg per tablet Take 1 tablet by mouth 4 (four) times a day as needed for diarrhea 30 tablet 2   • ergocalciferol (VITAMIN D2) 50,000 units Take 1 capsule (50,000 Units total) by mouth once a week 12 capsule 2   • ezetimibe (ZETIA) 10 mg tablet Take 1 tablet (10 mg total) by mouth daily 90 tablet 1   • glucose blood test strip Use 1 each daily Use as instructed 100 strip 11   • hydrochlorothiazide (HYDRODIURIL) 25 mg tablet Take 1 tablet (25 mg total) by mouth daily 90 tablet 1   • Januvia 50 MG tablet take 1 tablet by mouth once daily 90 tablet 3   • Lancets MISC Use daily 100 each 11   • levothyroxine 125 mcg tablet Take 1 tablet (125 mcg total) by mouth daily 90 tablet 1   • metFORMIN (GLUCOPHAGE-XR) 500 mg 24 hr tablet Take 4 tablets (2,000 mg total) by mouth daily with breakfast 360 tablet 1   • Multiple Vitamin (multivitamin) tablet Take 1 tablet by mouth daily       No current facility-administered medications for this visit  Review of Systems   Constitutional: Negative for chills and fever  HENT: Negative for ear pain and sore throat  Eyes: Negative for pain and visual disturbance  Respiratory: Negative for cough and shortness of breath  Cardiovascular: Negative for chest pain and palpitations  Gastrointestinal: Negative for abdominal pain and vomiting  Genitourinary: Negative for dysuria and hematuria  Musculoskeletal: Negative for arthralgias and back pain  Skin: Negative for color change and rash  Neurological: Negative for seizures and syncope  All other systems reviewed and are negative  Objective:    /60   Pulse 81   SpO2 98%   There is no height or weight on file to calculate BMI  Physical Exam  Constitutional:       Appearance: She is well-developed  She is obese  HENT:      Head: Normocephalic  Eyes:      Pupils: Pupils are equal, round, and reactive to light  Cardiovascular:      Rate and Rhythm: Normal rate and regular rhythm  Heart sounds: Normal heart sounds  Pulmonary:      Effort: Pulmonary effort is normal       Breath sounds: Normal breath sounds  Abdominal:      General: Bowel sounds are normal       Palpations: Abdomen is soft        Tenderness: There is no abdominal tenderness  Musculoskeletal:      Cervical back: Normal range of motion  Skin:     General: Skin is warm  Neurological:      Mental Status: She is alert and oriented to person, place, and time

## 2022-11-15 ENCOUNTER — TELEPHONE (OUTPATIENT)
Dept: HEMATOLOGY ONCOLOGY | Facility: CLINIC | Age: 67
End: 2022-11-15

## 2022-11-15 ENCOUNTER — CONSULT (OUTPATIENT)
Dept: HEMATOLOGY ONCOLOGY | Facility: CLINIC | Age: 67
End: 2022-11-15

## 2022-11-15 ENCOUNTER — APPOINTMENT (OUTPATIENT)
Dept: LAB | Facility: HOSPITAL | Age: 67
End: 2022-11-15

## 2022-11-15 VITALS
HEIGHT: 65 IN | WEIGHT: 220 LBS | RESPIRATION RATE: 16 BRPM | OXYGEN SATURATION: 97 % | BODY MASS INDEX: 36.65 KG/M2 | SYSTOLIC BLOOD PRESSURE: 140 MMHG | DIASTOLIC BLOOD PRESSURE: 70 MMHG | TEMPERATURE: 97.4 F | HEART RATE: 70 BPM

## 2022-11-15 DIAGNOSIS — D50.9 IRON DEFICIENCY ANEMIA, UNSPECIFIED IRON DEFICIENCY ANEMIA TYPE: Primary | ICD-10-CM

## 2022-11-15 DIAGNOSIS — D50.9 IRON DEFICIENCY ANEMIA, UNSPECIFIED IRON DEFICIENCY ANEMIA TYPE: ICD-10-CM

## 2022-11-15 DIAGNOSIS — E66.01 MORBID OBESITY (HCC): ICD-10-CM

## 2022-11-15 DIAGNOSIS — K90.9 MALABSORPTION SYNDROME: ICD-10-CM

## 2022-11-15 DIAGNOSIS — Z98.84 HISTORY OF ROUX-EN-Y GASTRIC BYPASS: ICD-10-CM

## 2022-11-15 LAB — VIT B12 SERPL-MCNC: 914 PG/ML (ref 100–900)

## 2022-11-15 RX ORDER — SODIUM CHLORIDE 9 MG/ML
20 INJECTION, SOLUTION INTRAVENOUS ONCE
Status: CANCELLED | OUTPATIENT
Start: 2022-11-30

## 2022-11-15 RX ORDER — SODIUM CHLORIDE 9 MG/ML
20 INJECTION, SOLUTION INTRAVENOUS ONCE
Status: CANCELLED | OUTPATIENT
Start: 2022-11-17

## 2022-11-15 NOTE — TELEPHONE ENCOUNTER
While we try to accommodate patient requests, our priority is to schedule treatment according to Doctor's orders and site availability  1  Does the Provider use the intake sheet or checkout note?no  2  What would be a preferred day of the week that would work best for your infusion appointment? Any  3  Do you prefer mornings or afternoons for your appointments? Any  4  Are there any days or dates that do not work for your schedule, including any upcoming vacations? No  5  We are going to try our best to schedule you at the infusion center closest to your home  In the event that we are unable to what would be your next preferred infusion site or sites? [facility]      6  Do you have transportation to take you to all of your appointments? yes  7  Would you like the infusion center to draw labs from your port? (disregard if patient doesn't have a port or need labs for infusion appointment) No     Pt   Needs to be scheduled this week due to needing surgery

## 2022-11-15 NOTE — TELEPHONE ENCOUNTER
Please schedule treatment for any day any time, provider states it must be this week due to needing surgery  Thank you!

## 2022-11-15 NOTE — PROGRESS NOTES
22 [facility] HEMATOLOGY ONCOLOGY [address] [facility]   [address] [facility] [address] [phone]  Hematology Ambulatory Consult  Lisa, 1955, [id number]  11/15/2022      Assessment and Plan   1  Iron deficiency anemia, unspecified iron deficiency anemia type  2  History of Hima-en-Y gastric bypass  3  Morbid obesity ([address] )  4  Malabsorption syndrome   Patient is a 62-year-old female with a history of hyperlipidemia, hypertension, hypothyroid, type 2 diabetes, morbid obesity status post Hima-en-Y gastric bypass surgery in 2013  She was expected to have abdominoplasty on 11/09/2022  CBC on 10/24/2022 showed hemoglobin of 8 0 with an MCV of 70, platelets 595 otherwise stable CBC and differential   Patient was referred to her PCP and instructed to take oral supplementation with hopes to improve hemoglobin prior to surgery  Patient was brought to the OR prep for surgery and intubated with start of general anesthesia  A fingerstick H&H was drawn prior to starting surgery hemoglobin was found to be 7 2  Surgery was ultimately canceled I just wanted  She was referred was created back to her PCP who referred her to our practice for further management of iron infusions  Per chart review patient has hemoglobin was stable around 12 6 until 2016  Patient states she had been having blood work done at [facility] I do not have access to all those results  In September 2021 it appears patient had a hemoglobin of 9 1 as noted by her PCP and in July 2022 hemoglobin dropped to 7 3 with an MCV of 66, platelets 517  I do not have an iron panel however serum iron in 2014 was 83  Most recent iron saturation was 3% with a ferritin level of 3 indicating she is iron deficient  Previous B12 level was done in 2014 was low at 224  We will make arrangements for an iron infusion with Feraheme 510 mg IV times 2 doses    I reviewed indications and adverse reactions including chest heaviness, shortness of breath, muscle cramping, headache and itching  I advised her to contact our office if she has any of these symptoms   Patient questioned when she can have the surgery  I reviewed that she is a surgical risk with a hemoglobin of 7 2 g per dL  We will make arrangements for the infusion to be done as soon as possible and request a CBC in 1 month  I would not recommend any surgical procedure unless hemoglobin is 10 5 or greater preferably closer to 12  Ultimately the decision is up to the surgeon  We will plan to see her in 3 months with repeat blood work  Patient verbalized understanding and is in agreement with plan  - Ambulatory Referral to Hematology / Oncology  - Vitamin B12; Future  - CBC; Future  - sodium chloride 0 9 % infusion  - ferumoxytol (FERAHEME) 510 mg in sodium chloride 0 9 % 100 mL IVPB    Barrier(s) to care: None  The patient is  able to self care  [address] [place] [address]    Subjective     Chief Complaint   Patient presents with   • Consult       Referring provider    Meghann Patel DO  [address] [facility]  [address]  [facility],  [address] [facility]    History of present illness:   Patient is a 68-year-old female with a history of hyperlipidemia, hypertension, hypothyroid, type 2 diabetes, morbid obesity status post Hima-en-Y gastric bypass surgery in 2013  She was expected to have abdominoplasty on 11/09/2022  CBC on 10/24/2022 showed hemoglobin of 8 0 with an MCV of 70, platelets 940 otherwise stable CBC and differential   Patient was referred to her PCP and instructed to take oral supplementation with hopes to improve hemoglobin prior to surgery  Patient was brought to the OR prep for surgery and intubated with start of general anesthesia  A fingerstick H&H was drawn prior to starting surgery hemoglobin was found to be 7 2  Surgery was ultimately canceled I just wanted    She was referred was created back to her PCP who referred her to our practice for further management of iron infusions  11/15/22: clinically stable    Review of Systems   Constitutional: Negative for activity change, appetite change, fatigue, fever and unexpected weight change  Respiratory: Negative for cough and shortness of breath  Cardiovascular: Negative for chest pain and leg swelling  Gastrointestinal: Negative for abdominal pain, constipation, diarrhea and nausea  Endocrine: Negative for cold intolerance and heat intolerance  Musculoskeletal: Negative for arthralgias and myalgias  Skin: Negative  itchy   Neurological: Negative for dizziness, weakness and headaches  Hematological: Negative for adenopathy  Does not bruise/bleed easily  Psychiatric/Behavioral: Positive for sleep disturbance       Past Medical History:   Diagnosis Date   • Hyperlipidemia    • Hypertension    • Hypothyroidism    • Morbid obesity ([facility] [address] )    • Pancreatitis 09/1997   • Sleep apnea     no cpap machine used   • Type [facility] mellitus [facility] )    • Vitamin D deficiency      Past Surgical History:   Procedure Laterality Date   • BREAST EXCISIONAL BIOPSY Left 2001    benign    • BREAST EXCISIONAL BIOPSY Left 07/2001   • CHOLECYSTECTOMY  04/2013   • COLONOSCOPY     • EGD     • GASTRIC BYPASS  05/2013   • MAMMO (HISTORICAL) Bilateral 12/2016    [facility]   • TONSILLECTOMY     • TOTAL KNEE ARTHROPLASTY Left 2013     Family History   Problem Relation Age of Onset   • Breast cancer Mother    • Lung cancer Mother    • Brain cancer Mother    • Breast cancer Maternal Aunt    • Breast cancer Maternal Aunt    • Heart attack Father    • Stroke Sister    • No Known Problems Daughter    • Cancer Paternal Grandmother         unknown origin    • Cancer Paternal Grandfather         unknown origin    • No Known Problems Sister    • No Known Problems Sister    • No Known Problems Sister    • No Known Problems Sister    • Cancer Paternal Aunt         unknown origin   • Cancer Paternal Aunt         unknown origin   • Cancer Paternal Aunt         unknown origin   • Lung cancer Paternal Aunt      Social History     Socioeconomic History   • Marital status: /Civil Union     Spouse name: None   • Number of children: None   • Years of education: None   • Highest education level: None   Occupational History   • None   Tobacco Use   • Smoking status: Never Smoker   • Smokeless tobacco: Never Used   Vaping Use   • Vaping Use: Never used   Substance and Sexual Activity   • Alcohol use: Yes     Comment: social   • Drug use: Never   • Sexual activity: None   Other Topics Concern   • None   Social History Narrative   • None     Social Determinants of Health     Financial Resource Strain: Not on file   Food Insecurity: Not on file   Transportation Needs: Not on file   Physical Activity: Not on file   Stress: Not on file   Social Connections: Not on file   Intimate Partner Violence: Not on file   Housing Stability: Not on file       Current Outpatient Medications:   •  amLODIPine (NORVASC) 5 mg tablet, Take 1 tablet (5 mg total) by mouth daily, Disp: 90 tablet, Rfl: 1  •  b complex vitamins capsule, Take 1 capsule by mouth daily, Disp: , Rfl:   •  Blood Glucose Monitoring Suppl ([employer] Blood Glucose Monitor) CARLOS, Use in the morning, Disp: 1 each, Rfl: 0  •  carvedilol (COREG) 6 25 mg tablet, Take 1 tablet (6 25 mg total) by mouth 2 (two) times a day, Disp: 180 tablet, Rfl: 1  •  Cyanocobalamin (VITAMIN B-12 PO), Take 1 capsule by mouth daily, Disp: , Rfl:   •  ergocalciferol (VITAMIN D2) 50,000 units, Take 1 capsule (50,000 Units total) by mouth once a week, Disp: 12 capsule, Rfl: 2  •  ezetimibe (ZETIA) 10 mg tablet, Take 1 tablet (10 mg total) by mouth daily, Disp: 90 tablet, Rfl: 1  •  glucose blood test strip, Use 1 each daily Use as instructed, Disp: 100 strip, Rfl: 11  •  hydrochlorothiazide (HYDRODIURIL) 25 mg tablet, Take 1 tablet (25 mg total) by mouth daily, Disp: 90 tablet, Rfl: 1  •  Januvia 50 MG tablet, take 1 tablet by mouth once daily, Disp: 90 tablet, Rfl: 3  •  Lancets MISC, Use daily, Disp: 100 each, Rfl: 11  •  levothyroxine 125 mcg tablet, Take 1 tablet (125 mcg total) by mouth daily, Disp: 90 tablet, Rfl: 1  •  metFORMIN (GLUCOPHAGE-XR) 500 mg 24 hr tablet, Take 4 tablets (2,000 mg total) by mouth daily with breakfast, Disp: 360 tablet, Rfl: 1  •  Multiple Vitamin (multivitamin) tablet, Take 1 tablet by mouth daily, Disp: , Rfl:   Allergies   Allergen Reactions   • Aspirin Angioedema   • Codeine Angioedema   • Nsaids Angioedema   • Statins Other (See Comments) and Angioedema     Patient states allergic to statins   • Wound Dressing Adhesive Rash     Objective   /70 (BP Location: Right arm, Patient Position: Sitting, Cuff Size: Extra-Large)   Pulse 70   Temp (!) 97 4 °F (36 3 °C) (Tympanic)   Resp 16   Ht 5' 4 5" (1 638 m)   Wt 99 8 kg (220 lb)   SpO2 97%   BMI 37 18 kg/m²   Physical Exam  Constitutional:       Appearance: Normal appearance  She is well-developed  She is obese  HENT:      Head: Normocephalic and atraumatic  Eyes:      Conjunctiva/sclera: Conjunctivae normal       Pupils: Pupils are equal, round, and reactive to light  Cardiovascular:      Rate and Rhythm: Normal rate and regular rhythm  Pulses: Normal pulses  Heart sounds: Normal heart sounds  No murmur heard  Pulmonary:      Effort: Pulmonary effort is normal  No respiratory distress  Breath sounds: Normal breath sounds  Abdominal:      General: Bowel sounds are normal       Palpations: Abdomen is soft  Musculoskeletal:         General: Normal range of motion  Cervical back: Normal range of motion and neck supple  Lymphadenopathy:      Cervical: No cervical adenopathy  Skin:     General: Skin is warm and dry  Capillary Refill: Capillary refill takes less than 2 seconds  Neurological:      Mental Status: She is alert and oriented to person, place, and time  Psychiatric:         Behavior: Behavior normal      Result Review  Labs:  Appointment on 11/11/2022   Component Date Value Ref Range Status   • Iron Saturation 11/11/2022 3 (A) 15 - 50 % Final   • TIBC 11/11/2022 449  250 - 450 ug/dL Final   • Iron 11/11/2022 14 (A) 50 - 170 ug/dL Final    Patients treated with metal-binding drugs (ie  Deferoxamine) may have depressed iron values     • Ferritin 11/11/2022 3 (A) 8 - 388 ng/mL Final   Admission on 11/09/2022, Discharged on 11/09/2022   Component Date Value Ref Range Status   • POC Glucose 11/09/2022 147 (A) 65 - 140 mg/dl Final   • Hemoglobin 11/09/2022 7 2 (A) 11 5 - 15 4 g/dL Final   • Hematocrit 11/09/2022 25 9 (A) 34 8 - 46 1 % Final   • POC Glucose 11/09/2022 138  65 - 140 mg/dl Final   • ph, Nagi ISTAT 11/09/2022 7 450 (A) 7 300 - 7 400 Final   • pCO2, Nagi i-STAT 11/09/2022 38 4 (A) 42 0 - 50 0 mm HG Final   • pO2, Nagi i-STAT 11/09/2022 23 0 (A) 35 0 - 45 0 mm HG Final   • BE, i-STAT 11/09/2022 3  -2 - 3 mmol/L Final   • HCO3, Nagi i-STAT 11/09/2022 26 7  24 0 - 30 0 mmol/L Final   • CO2, i-STAT 11/09/2022 28  21 - 32 mmol/L Final   • O2 Sat, i-STAT 11/09/2022 43 (A) 60 - 85 % Final   • SODIUM, I-STAT 11/09/2022 139  136 - 145 mmol/l Final   • Potassium, i-STAT 11/09/2022 4 2  3 5 - 5 3 mmol/L Final   • Calcium, Ionized i-STAT 11/09/2022 1 29  1 12 - 1 32 mmol/L Final   • Hct, i-STAT 11/09/2022 27 (A) 34 8 - 46 1 % Final   • Hgb, i-STAT 11/09/2022 9 2 (A) 11 5 - 15 4 g/dl Final   • Glucose, i-STAT 11/09/2022 135  65 - 140 mg/dl Final   • Specimen Type 11/09/2022 VENOUS   Final   Appointment on 10/24/2022   Component Date Value Ref Range Status   • TSH 3RD GENERATON 10/24/2022 2 053  0 450 - 4 500 uIU/mL Final    The recommended reference ranges for TSH during pregnancy are as follows:   First trimester 0 1 to 2 5 uIU/mL   Second trimester  0 2 to 3 0 uIU/mL   Third trimester 0 3 to 3 0 uIU/m    Note: Normal ranges may not apply to patients who are transgender, non-binary, or whose legal sex, sex at birth, and gender identity differ  Adult TSH (3rd generation) reference range follows the recommended guidelines of the American Thyroid Association, January, 2020  • WBC 10/24/2022 7 69  4 31 - 10 16 Thousand/uL Final   • RBC 10/24/2022 4 09  3 81 - 5 12 Million/uL Final   • Hemoglobin 10/24/2022 8 0 (A) 11 5 - 15 4 g/dL Final   • Hematocrit 10/24/2022 28 6 (A) 34 8 - 46 1 % Final   • MCV 10/24/2022 70 (A) 82 - 98 fL Final   • MCH 10/24/2022 19 6 (A) 26 8 - 34 3 pg Final   • MCHC 10/24/2022 28 0 (A) 31 4 - 37 4 g/dL Final   • RDW 10/24/2022 20 1 (A) 11 6 - 15 1 % Final   • MPV 10/24/2022 9 3  8 9 - 12 7 fL Final   • Platelets 01/31/4773 453 (A) 149 - 390 Thousands/uL Final   • nRBC 10/24/2022 0  /100 WBCs Final   • Neutrophils Relative 10/24/2022 72  43 - 75 % Final   • Immat GRANS % 10/24/2022 0  0 - 2 % Final   • Lymphocytes Relative 10/24/2022 20  14 - 44 % Final   • Monocytes Relative 10/24/2022 7  4 - 12 % Final   • Eosinophils Relative 10/24/2022 1  0 - 6 % Final   • Basophils Relative 10/24/2022 0  0 - 1 % Final   • Neutrophils Absolute 10/24/2022 5 49  1 85 - 7 62 Thousands/µL Final   • Immature Grans Absolute 10/24/2022 0 02  0 00 - 0 20 Thousand/uL Final   • Lymphocytes Absolute 10/24/2022 1 54  0 60 - 4 47 Thousands/µL Final   • Monocytes Absolute 10/24/2022 0 53  0 17 - 1 22 Thousand/µL Final   • Eosinophils Absolute 10/24/2022 0 10  0 00 - 0 61 Thousand/µL Final   • Basophils Absolute 10/24/2022 0 01  0 00 - 0 10 Thousands/µL Final   • Hemoglobin A1C 10/24/2022 6 4 (A) Normal 3 8-5 6%; PreDiabetic 5 7-6 4%;  Diabetic >=6 5%; Glycemic control for adults with diabetes <7 0% % Final   • EAG 10/24/2022 137  mg/dl Final   • Sodium 10/24/2022 138  135 - 147 mmol/L Final   • Potassium 10/24/2022 3 8  3 5 - 5 3 mmol/L Final   • Chloride 10/24/2022 103  96 - 108 mmol/L Final   • CO2 10/24/2022 27  21 - 32 mmol/L Final   • ANION GAP 10/24/2022 8  4 - 13 mmol/L Final   • BUN 10/24/2022 12  5 - 25 mg/dL Final   • Creatinine 10/24/2022 0 94  0 60 - 1 30 mg/dL Final    Standardized to IDMS reference method   • Glucose, Fasting 10/24/2022 129 (A) 65 - 99 mg/dL Final    Specimen collection should occur prior to Sulfasalazine administration due to the potential for falsely depressed results  Specimen collection should occur prior to Sulfapyridine administration due to the potential for falsely elevated results  • Calcium 10/24/2022 9 2  8 3 - 10 1 mg/dL Final   • Corrected Calcium 10/24/2022 9 8  8 3 - 10 1 mg/dL Final   • AST 10/24/2022 18  5 - 45 U/L Final    Specimen collection should occur prior to Sulfasalazine administration due to the potential for falsely depressed results  • ALT 10/24/2022 21  12 - 78 U/L Final    Specimen collection should occur prior to Sulfasalazine administration due to the potential for falsely depressed results  • Alkaline Phosphatase 10/24/2022 80  46 - 116 U/L Final   • Total Protein 10/24/2022 7 3  6 4 - 8 4 g/dL Final   • Albumin 10/24/2022 3 2 (A) 3 5 - 5 0 g/dL Final   • Total Bilirubin 10/24/2022 1 00  0 20 - 1 00 mg/dL Final    Use of this assay is not recommended for patients undergoing treatment with eltrombopag due to the potential for falsely elevated results     • eGFR 10/24/2022 64  ml/min/1 73sq m Final   • Cholesterol 10/24/2022 171  See Comment mg/dL Final    Cholesterol:         Pediatric <18 Years        Desirable          <170 mg/dL      Borderline High    170-199 mg/dL      High               >=200 mg/dL        Adult >=18 Years            Desirable         <200 mg/dL      Borderline High   200-239 mg/dL      High              >239 mg/dL     • Triglycerides 10/24/2022 106  See Comment mg/dL Final    Triglyceride:     0-9Y            <75mg/dL     10Y-17Y         <90 mg/dL       >=18Y     Normal          <150 mg/dL     Borderline High 150-199 mg/dL     High            200-499 mg/dL        Very High       >499 mg/dL    Specimen collection should occur prior to N-Acetylcysteine or Metamizole administration due to the potential for falsely depressed results  • HDL, Direct 10/24/2022 61  >=50 mg/dL Final    Specimen collection should occur prior to Metamizole administration due to the potential for falsley depressed results  • LDL Calculated 10/24/2022 89  0 - 100 mg/dL Final    LDL Cholesterol:     Optimal           <100 mg/dl     Near Optimal      100-129 mg/dl     Above Optimal       Borderline High 130-159 mg/dl       High            160-189 mg/dl       Very High       >189 mg/dl         This screening LDL is a calculated result  It does not have the accuracy of the Direct Measured LDL in the monitoring of patients with hyperlipidemia and/or statin therapy  Direct Measure LDL (KFH224) must be ordered separately in these patients  • Protime 10/24/2022 13 1  11 6 - 14 5 seconds Final   • INR 10/24/2022 0 97  0 84 - 1 19 Final   • PTT 10/24/2022 29  23 - 37 seconds Final    Therapeutic Heparin Range =  60-90 seconds   Appointment on 10/24/2022   Component Date Value Ref Range Status   • Ventricular Rate 10/24/2022 62  BPM Final   • Atrial Rate 10/24/2022 62  BPM Final   • CT Interval 10/24/2022 154  ms Final   • QRSD Interval 10/24/2022 90  ms Final   • QT Interval 10/24/2022 440  ms Final   • QTC Interval 10/24/2022 446  ms Final   • P Axis 10/24/2022 53  degrees Final   • QRS Axis 10/24/2022 -20  degrees Final   • T Wave Axis 10/24/2022 28  degrees Final     Please note: This report has been generated by a voice recognition software system  Therefore there may be syntax, spelling, and/or grammatical errors  Please call if you have any questions

## 2022-11-16 DIAGNOSIS — D50.9 IRON DEFICIENCY ANEMIA, UNSPECIFIED IRON DEFICIENCY ANEMIA TYPE: ICD-10-CM

## 2022-11-16 DIAGNOSIS — Z98.84 HISTORY OF ROUX-EN-Y GASTRIC BYPASS: Primary | ICD-10-CM

## 2022-11-16 DIAGNOSIS — E66.01 MORBID OBESITY (HCC): ICD-10-CM

## 2022-11-16 DIAGNOSIS — K90.9 MALABSORPTION SYNDROME: ICD-10-CM

## 2022-11-17 ENCOUNTER — HOSPITAL ENCOUNTER (OUTPATIENT)
Dept: INFUSION CENTER | Facility: HOSPITAL | Age: 67
Discharge: HOME | End: 2022-11-17
Attending: INTERNAL MEDICINE

## 2022-11-17 VITALS
DIASTOLIC BLOOD PRESSURE: 64 MMHG | TEMPERATURE: 97.4 F | OXYGEN SATURATION: 98 % | SYSTOLIC BLOOD PRESSURE: 137 MMHG | HEART RATE: 79 BPM | RESPIRATION RATE: 16 BRPM

## 2022-11-17 DIAGNOSIS — Z98.84 HISTORY OF ROUX-EN-Y GASTRIC BYPASS: ICD-10-CM

## 2022-11-17 DIAGNOSIS — D50.9 IRON DEFICIENCY ANEMIA, UNSPECIFIED IRON DEFICIENCY ANEMIA TYPE: ICD-10-CM

## 2022-11-17 DIAGNOSIS — K90.9 MALABSORPTION SYNDROME: ICD-10-CM

## 2022-11-17 DIAGNOSIS — E66.01 MORBID OBESITY (HCC): Primary | ICD-10-CM

## 2022-11-17 RX ORDER — SODIUM CHLORIDE 9 MG/ML
20 INJECTION, SOLUTION INTRAVENOUS ONCE
Status: COMPLETED | OUTPATIENT
Start: 2022-11-17 | End: 2022-11-17

## 2022-11-17 RX ORDER — SODIUM CHLORIDE 9 MG/ML
20 INJECTION, SOLUTION INTRAVENOUS ONCE
Status: CANCELLED | OUTPATIENT
Start: 2022-11-28

## 2022-11-17 RX ADMIN — SODIUM CHLORIDE 20 ML/HR: 0.9 INJECTION, SOLUTION INTRAVENOUS at 11:03

## 2022-11-17 RX ADMIN — FERUMOXYTOL 510 MG: 510 INJECTION INTRAVENOUS at 11:04

## 2022-11-28 ENCOUNTER — HOSPITAL ENCOUNTER (OUTPATIENT)
Dept: INFUSION CENTER | Facility: HOSPITAL | Age: 67
Discharge: HOME | End: 2022-11-28
Attending: INTERNAL MEDICINE

## 2022-11-28 VITALS
RESPIRATION RATE: 18 BRPM | HEART RATE: 66 BPM | TEMPERATURE: 96.9 F | SYSTOLIC BLOOD PRESSURE: 131 MMHG | DIASTOLIC BLOOD PRESSURE: 65 MMHG | OXYGEN SATURATION: 99 %

## 2022-11-28 DIAGNOSIS — Z98.84 HISTORY OF ROUX-EN-Y GASTRIC BYPASS: ICD-10-CM

## 2022-11-28 DIAGNOSIS — D50.9 IRON DEFICIENCY ANEMIA, UNSPECIFIED IRON DEFICIENCY ANEMIA TYPE: ICD-10-CM

## 2022-11-28 DIAGNOSIS — K90.9 MALABSORPTION SYNDROME: ICD-10-CM

## 2022-11-28 DIAGNOSIS — E66.01 MORBID OBESITY (HCC): Primary | ICD-10-CM

## 2022-11-28 RX ORDER — SODIUM CHLORIDE 9 MG/ML
20 INJECTION, SOLUTION INTRAVENOUS ONCE
Status: CANCELLED | OUTPATIENT
Start: 2022-12-01

## 2022-11-28 RX ORDER — SODIUM CHLORIDE 9 MG/ML
20 INJECTION, SOLUTION INTRAVENOUS ONCE
Status: COMPLETED | OUTPATIENT
Start: 2022-11-28 | End: 2022-11-28

## 2022-11-28 RX ADMIN — SODIUM CHLORIDE 20 ML/HR: 0.9 INJECTION, SOLUTION INTRAVENOUS at 11:16

## 2022-11-28 RX ADMIN — FERUMOXYTOL 510 MG: 510 INJECTION INTRAVENOUS at 11:22

## 2022-12-14 ENCOUNTER — APPOINTMENT (OUTPATIENT)
Dept: LAB | Facility: HOSPITAL | Age: 67
End: 2022-12-14

## 2022-12-14 DIAGNOSIS — D50.9 IRON DEFICIENCY ANEMIA, UNSPECIFIED IRON DEFICIENCY ANEMIA TYPE: ICD-10-CM

## 2022-12-14 LAB
ERYTHROCYTE [DISTWIDTH] IN BLOOD BY AUTOMATED COUNT: 25.9 % (ref 11.6–15.1)
HCT VFR BLD AUTO: 37.3 % (ref 34.8–46.1)
HGB BLD-MCNC: 11.2 G/DL (ref 11.5–15.4)
MCH RBC QN AUTO: 23.6 PG (ref 26.8–34.3)
MCHC RBC AUTO-ENTMCNC: 30 G/DL (ref 31.4–37.4)
MCV RBC AUTO: 79 FL (ref 82–98)
PLATELET # BLD AUTO: 306 THOUSANDS/UL (ref 149–390)
PMV BLD AUTO: 9.1 FL (ref 8.9–12.7)
RBC # BLD AUTO: 4.75 MILLION/UL (ref 3.81–5.12)
WBC # BLD AUTO: 6.8 THOUSAND/UL (ref 4.31–10.16)

## 2022-12-27 ENCOUNTER — TELEPHONE (OUTPATIENT)
Dept: FAMILY MEDICINE CLINIC | Facility: CLINIC | Age: 67
End: 2022-12-27

## 2022-12-27 NOTE — TELEPHONE ENCOUNTER
Patient called; stating needs PA on Januvia  Called [facility] to confirm   Pharm states would fax over [place] PA request

## 2022-12-29 NOTE — TELEPHONE ENCOUNTER
Spoke to [facility]AL - patient needs to contact insurance - her insurance information is not matching up    LM on patient's VM with the info

## 2022-12-30 ENCOUNTER — HOSPITAL ENCOUNTER (OUTPATIENT)
Dept: MAMMOGRAPHY | Facility: HOSPITAL | Age: 67
Discharge: HOME | End: 2022-12-30

## 2022-12-30 VITALS — HEIGHT: 65 IN | BODY MASS INDEX: 36.65 KG/M2 | WEIGHT: 220 LBS

## 2022-12-30 DIAGNOSIS — Z12.31 ENCOUNTER FOR SCREENING MAMMOGRAM FOR MALIGNANT NEOPLASM OF BREAST: ICD-10-CM

## 2022-12-30 DIAGNOSIS — Z98.84 HISTORY OF ROUX-EN-Y GASTRIC BYPASS: ICD-10-CM

## 2022-12-30 DIAGNOSIS — D50.9 IRON DEFICIENCY ANEMIA, UNSPECIFIED IRON DEFICIENCY ANEMIA TYPE: Primary | ICD-10-CM

## 2022-12-30 DIAGNOSIS — K90.9 MALABSORPTION SYNDROME: ICD-10-CM

## 2023-01-06 ENCOUNTER — RA CDI HCC (OUTPATIENT)
Dept: OTHER | Facility: HOSPITAL | Age: 68
End: 2023-01-06

## 2023-01-06 NOTE — PROGRESS NOTES
[facility]  coding opportunities       Chart reviewed, no opportunity found:   [address]        Patients Insurance     Medicare Insurance: [employer]vantage

## 2023-01-12 ENCOUNTER — APPOINTMENT (OUTPATIENT)
Dept: LAB | Facility: HOSPITAL | Age: 68
End: 2023-01-12

## 2023-01-12 ENCOUNTER — OFFICE VISIT (OUTPATIENT)
Dept: FAMILY MEDICINE CLINIC | Facility: CLINIC | Age: 68
End: 2023-01-12

## 2023-01-12 VITALS
OXYGEN SATURATION: 98 % | HEART RATE: 64 BPM | DIASTOLIC BLOOD PRESSURE: 72 MMHG | SYSTOLIC BLOOD PRESSURE: 110 MMHG | TEMPERATURE: 98 F

## 2023-01-12 DIAGNOSIS — E78.5 DYSLIPIDEMIA: ICD-10-CM

## 2023-01-12 DIAGNOSIS — E03.9 ACQUIRED HYPOTHYROIDISM: ICD-10-CM

## 2023-01-12 DIAGNOSIS — Z98.84 HISTORY OF ROUX-EN-Y GASTRIC BYPASS: ICD-10-CM

## 2023-01-12 DIAGNOSIS — E55.9 VITAMIN D DEFICIENCY: ICD-10-CM

## 2023-01-12 DIAGNOSIS — D50.9 IRON DEFICIENCY ANEMIA, UNSPECIFIED IRON DEFICIENCY ANEMIA TYPE: ICD-10-CM

## 2023-01-12 DIAGNOSIS — E11.69 DIABETES MELLITUS TYPE 2 IN OBESE (HCC): ICD-10-CM

## 2023-01-12 DIAGNOSIS — I10 ESSENTIAL HYPERTENSION: ICD-10-CM

## 2023-01-12 DIAGNOSIS — E66.01 MORBID OBESITY (HCC): ICD-10-CM

## 2023-01-12 DIAGNOSIS — Z01.818 PREOPERATIVE CLEARANCE: Primary | ICD-10-CM

## 2023-01-12 DIAGNOSIS — K90.9 MALABSORPTION SYNDROME: ICD-10-CM

## 2023-01-12 DIAGNOSIS — E66.9 DIABETES MELLITUS TYPE 2 IN OBESE (HCC): ICD-10-CM

## 2023-01-12 LAB
ERYTHROCYTE [DISTWIDTH] IN BLOOD BY AUTOMATED COUNT: 22.5 % (ref 11.6–15.1)
FERRITIN SERPL-MCNC: 10 NG/ML (ref 8–388)
HCT VFR BLD AUTO: 41.5 % (ref 34.8–46.1)
HGB BLD-MCNC: 12.8 G/DL (ref 11.5–15.4)
IRON SATN MFR SERPL: 10 % (ref 15–50)
IRON SERPL-MCNC: 40 UG/DL (ref 50–170)
MCH RBC QN AUTO: 24.3 PG (ref 26.8–34.3)
MCHC RBC AUTO-ENTMCNC: 30.8 G/DL (ref 31.4–37.4)
MCV RBC AUTO: 79 FL (ref 82–98)
PLATELET # BLD AUTO: 343 THOUSANDS/UL (ref 149–390)
PMV BLD AUTO: 8.8 FL (ref 8.9–12.7)
RBC # BLD AUTO: 5.26 MILLION/UL (ref 3.81–5.12)
TIBC SERPL-MCNC: 386 UG/DL (ref 250–450)
WBC # BLD AUTO: 9.46 THOUSAND/UL (ref 4.31–10.16)

## 2023-01-12 RX ORDER — LEVOTHYROXINE SODIUM 0.12 MG/1
125 TABLET ORAL DAILY
Qty: 90 TABLET | Refills: 1 | Status: SHIPPED | OUTPATIENT
Start: 2023-01-12

## 2023-01-12 RX ORDER — HYDROCHLOROTHIAZIDE 25 MG/1
25 TABLET ORAL DAILY
Qty: 90 TABLET | Refills: 1 | Status: SHIPPED | OUTPATIENT
Start: 2023-01-12

## 2023-01-12 NOTE — PROGRESS NOTES
Assessment/Plan: Patient's hemoglobin has corrected to 11 2 her last hemoglobin A1c 6 2  The patient is cleared for surgery    Deficiency anemia with a history of Hima-en-Y gastric bypass with malabsorption the patient has received iron infusion hematology consultation on the 15th 2022 reviewed and appreciated    Hypothyroidism euthyroid on current replacement therapy    Morbid obesity with a BMI of 37 18    Hypertensive cardiovascular disease with a blood pressure controlled on current regimen    Dyslipidemia on Zetia 10 mg laboratory is pending    Problem List Items Addressed This Visit        Endocrine    Hypothyroidism   Other Visit Diagnoses     Essential hypertension               Diagnoses and all orders for this visit:    Acquired hypothyroidism    Essential hypertension        No problem-specific Assessment & Plan notes found for this encounter  PHQ-2/9 Depression Screening    Little interest or pleasure in doing things: 0 - not at all  Feeling down, depressed, or hopeless: 0 - not at all          There is no height or weight on file to calculate BMI  BMI Counseling: There is no height or weight on file to calculate BMI  The BMI     Subjective:      Patient ID: Eneida Elizondo is a 74 y o  female  Presents for clearance for plastic surgery      The following portions of the patient's history were reviewed and updated as appropriate:   She has a past medical history of Hyperlipidemia, Hypertension, Hypothyroidism, Morbid obesity ([facility] [address] ), Pancreatitis (09/1997), Sleep apnea, Type 2 diabetes mellitus ([facility] ), and Vitamin D deficiency  ,  does not have any pertinent problems on file  ,   has a past surgical history that includes Gastric bypass (05/2013); Cholecystectomy (04/2013); Total knee arthroplasty (Left, 2013); Breast excisional biopsy (Left, 07/2001); Colonoscopy; EGD; and Tonsillectomy  ,  family history includes Brain cancer in her mother; Breast cancer in her maternal aunt, maternal aunt, and mother; Cancer in her paternal aunt, paternal aunt, paternal aunt, paternal grandfather, and paternal grandmother; Heart attack in her father; Lung cancer in her mother and paternal aunt; No Known Problems in her daughter, maternal grandfather, maternal grandmother, sister, sister, sister, and sister; Stroke in her sister  ,   reports that she has never smoked  She has never used smokeless tobacco  She reports that she does not currently use alcohol  She reports that she does not use drugs  ,  is allergic to aspirin, codeine, nsaids, statins, and wound dressing adhesive     Current Outpatient Medications   Medication Sig Dispense Refill   • amLODIPine (NORVASC) 5 mg tablet Take 1 tablet (5 mg total) by mouth daily 90 tablet 1   • b complex vitamins capsule Take 1 capsule by mouth daily (Patient not taking: Reported on 11/28/2022)     • Blood Glucose Monitoring Suppl ([employer] Blood Glucose Monitor) CARLOS Use in the morning 1 each 0   • carvedilol (COREG) 6 25 mg tablet Take 1 tablet (6 25 mg total) by mouth 2 (two) times a day 180 tablet 1   • ergocalciferol (VITAMIN D2) 50,000 units Take 1 capsule (50,000 Units total) by mouth once a week 12 capsule 2   • ezetimibe (ZETIA) 10 mg tablet Take 1 tablet (10 mg total) by mouth daily 90 tablet 1   • glucose blood test strip Use 1 each daily Use as instructed 100 strip 11   • hydrochlorothiazide (HYDRODIURIL) 25 mg tablet Take 1 tablet (25 mg total) by mouth daily 90 tablet 1   • Januvia 50 MG tablet take 1 tablet by mouth once daily 90 tablet 3   • Lancets MISC Use daily 100 each 11   • levothyroxine 125 mcg tablet Take 1 tablet (125 mcg total) by mouth daily 90 tablet 1   • metFORMIN (GLUCOPHAGE-XR) 500 mg 24 hr tablet Take 4 tablets (2,000 mg total) by mouth daily with breakfast 360 tablet 1   • Multiple Vitamin (multivitamin) tablet Take 1 tablet by mouth daily       No current facility-administered medications for this visit         Review of Systems   Constitutional: Negative for chills and fever  HENT: Negative for ear pain and sore throat  Eyes: Negative for pain and visual disturbance  Respiratory: Negative for cough and shortness of breath  Cardiovascular: Negative for chest pain and palpitations  Gastrointestinal: Negative for abdominal pain and vomiting  Genitourinary: Negative for dysuria and hematuria  Musculoskeletal: Negative for arthralgias and back pain  Skin: Negative for color change and rash  Neurological: Negative for seizures and syncope  All other systems reviewed and are negative  Objective:    /72 (BP Location: Right arm, Patient Position: Sitting, Cuff Size: Large)   Pulse 64   Temp 98 °F (36 7 °C) (Tympanic)   SpO2 98%   There is no height or weight on file to calculate BMI  Physical Exam  Constitutional:       Appearance: She is well-developed  She is obese  HENT:      Head: Normocephalic  Eyes:      Pupils: Pupils are equal, round, and reactive to light  Cardiovascular:      Rate and Rhythm: Normal rate and regular rhythm  Heart sounds: Normal heart sounds  Pulmonary:      Effort: Pulmonary effort is normal       Breath sounds: Normal breath sounds  Abdominal:      General: Bowel sounds are normal       Palpations: Abdomen is soft  Tenderness: There is no abdominal tenderness  Comments: Large pannus   Musculoskeletal:      Cervical back: Normal range of motion  Skin:     General: Skin is warm  Neurological:      Mental Status: She is alert and oriented to person, place, and time

## 2023-02-08 ENCOUNTER — APPOINTMENT (OUTPATIENT)
Dept: LAB | Facility: HOSPITAL | Age: 68
End: 2023-02-08
Attending: FAMILY MEDICINE

## 2023-02-08 DIAGNOSIS — E78.5 DYSLIPIDEMIA: ICD-10-CM

## 2023-02-08 DIAGNOSIS — E03.9 ACQUIRED HYPOTHYROIDISM: ICD-10-CM

## 2023-02-08 DIAGNOSIS — E11.69 DIABETES MELLITUS TYPE 2 IN OBESE (HCC): ICD-10-CM

## 2023-02-08 DIAGNOSIS — Z01.812 PRE-OPERATIVE LABORATORY EXAMINATION: ICD-10-CM

## 2023-02-08 DIAGNOSIS — E55.9 VITAMIN D DEFICIENCY: ICD-10-CM

## 2023-02-08 DIAGNOSIS — E66.9 DIABETES MELLITUS TYPE 2 IN OBESE (HCC): ICD-10-CM

## 2023-02-08 LAB
25(OH)D3 SERPL-MCNC: 70.8 NG/ML (ref 30–100)
ALBUMIN SERPL BCP-MCNC: 3.9 G/DL (ref 3.5–5)
ALP SERPL-CCNC: 69 U/L (ref 34–104)
ALT SERPL W P-5'-P-CCNC: 13 U/L (ref 7–52)
ANION GAP SERPL CALCULATED.3IONS-SCNC: 7 MMOL/L (ref 4–13)
APTT PPP: 29 SECONDS (ref 23–37)
AST SERPL W P-5'-P-CCNC: 15 U/L (ref 13–39)
BASOPHILS # BLD AUTO: 0.02 THOUSANDS/ÂΜL (ref 0–0.1)
BASOPHILS NFR BLD AUTO: 0 % (ref 0–1)
BILIRUB SERPL-MCNC: 0.94 MG/DL (ref 0.2–1)
BUN SERPL-MCNC: 14 MG/DL (ref 5–25)
CALCIUM SERPL-MCNC: 9.6 MG/DL (ref 8.4–10.2)
CHLORIDE SERPL-SCNC: 103 MMOL/L (ref 96–108)
CHOLEST SERPL-MCNC: 169 MG/DL
CO2 SERPL-SCNC: 30 MMOL/L (ref 21–32)
CREAT SERPL-MCNC: 0.91 MG/DL (ref 0.6–1.3)
EOSINOPHIL # BLD AUTO: 0.27 THOUSAND/ÂΜL (ref 0–0.61)
EOSINOPHIL NFR BLD AUTO: 4 % (ref 0–6)
ERYTHROCYTE [DISTWIDTH] IN BLOOD BY AUTOMATED COUNT: 18.5 % (ref 11.6–15.1)
GFR SERPL CREATININE-BSD FRML MDRD: 66 ML/MIN/1.73SQ M
GLUCOSE P FAST SERPL-MCNC: 110 MG/DL (ref 65–99)
HCT VFR BLD AUTO: 38.9 % (ref 34.8–46.1)
HDLC SERPL-MCNC: 57 MG/DL
HGB BLD-MCNC: 12.5 G/DL (ref 11.5–15.4)
IMM GRANULOCYTES # BLD AUTO: 0.02 THOUSAND/UL (ref 0–0.2)
IMM GRANULOCYTES NFR BLD AUTO: 0 % (ref 0–2)
INR PPP: 1 (ref 0.84–1.19)
LDLC SERPL CALC-MCNC: 93 MG/DL (ref 0–100)
LYMPHOCYTES # BLD AUTO: 1.76 THOUSANDS/ÂΜL (ref 0.6–4.47)
LYMPHOCYTES NFR BLD AUTO: 24 % (ref 14–44)
MCH RBC QN AUTO: 25.8 PG (ref 26.8–34.3)
MCHC RBC AUTO-ENTMCNC: 32.1 G/DL (ref 31.4–37.4)
MCV RBC AUTO: 80 FL (ref 82–98)
MONOCYTES # BLD AUTO: 0.51 THOUSAND/ÂΜL (ref 0.17–1.22)
MONOCYTES NFR BLD AUTO: 7 % (ref 4–12)
NEUTROPHILS # BLD AUTO: 4.82 THOUSANDS/ÂΜL (ref 1.85–7.62)
NEUTS SEG NFR BLD AUTO: 65 % (ref 43–75)
NRBC BLD AUTO-RTO: 0 /100 WBCS
PLATELET # BLD AUTO: 308 THOUSANDS/UL (ref 149–390)
PMV BLD AUTO: 8.7 FL (ref 8.9–12.7)
POTASSIUM SERPL-SCNC: 3.9 MMOL/L (ref 3.5–5.3)
PROT SERPL-MCNC: 7 G/DL (ref 6.4–8.4)
PROTHROMBIN TIME: 13.4 SECONDS (ref 11.6–14.5)
RBC # BLD AUTO: 4.84 MILLION/UL (ref 3.81–5.12)
SODIUM SERPL-SCNC: 140 MMOL/L (ref 135–147)
TRIGL SERPL-MCNC: 95 MG/DL
TSH SERPL DL<=0.05 MIU/L-ACNC: 2.8 UIU/ML (ref 0.45–4.5)
WBC # BLD AUTO: 7.4 THOUSAND/UL (ref 4.31–10.16)

## 2023-02-09 NOTE — PRE-PROCEDURE INSTRUCTIONS
Pre-Surgery Instructions:   Medication Instructions   • amLODIPine (NORVASC) 5 mg tablet Take day of surgery  • b complex vitamins capsule Stop taking 7 days prior to surgery  • carvedilol (COREG) 6 25 mg tablet Take day of surgery  • ergocalciferol (VITAMIN D2) 50,000 units Stop taking 7 days prior to surgery  • ezetimibe (ZETIA) 10 mg tablet Take night before surgery   • hydrochlorothiazide (HYDRODIURIL) 25 mg tablet Hold day of surgery  • Januvia 50 MG tablet Hold day of surgery  • levothyroxine 125 mcg tablet Take day of surgery  • metFORMIN (GLUCOPHAGE-XR) 500 mg 24 hr tablet Hold day of surgery  • Multiple Vitamin (multivitamin) tablet Stop taking 7 days prior to surgery  Covid screening negative as per patient  Fully vaccinated  Reviewed showering and medication instructions  Take medications morning of surgery with a small sip of water  Patient verbalized understanding  Advised NPO after MN and ASC will call with scheduled surgical time  Advised to notify surgeon's office for any illness prior to day of surgery

## 2023-02-10 ENCOUNTER — TELEPHONE (OUTPATIENT)
Dept: HEMATOLOGY ONCOLOGY | Facility: CLINIC | Age: 68
End: 2023-02-10

## 2023-02-10 ENCOUNTER — OFFICE VISIT (OUTPATIENT)
Dept: HEMATOLOGY ONCOLOGY | Facility: CLINIC | Age: 68
End: 2023-02-10

## 2023-02-10 VITALS
TEMPERATURE: 97.1 F | HEIGHT: 65 IN | OXYGEN SATURATION: 98 % | BODY MASS INDEX: 37.18 KG/M2 | DIASTOLIC BLOOD PRESSURE: 68 MMHG | SYSTOLIC BLOOD PRESSURE: 128 MMHG | HEART RATE: 70 BPM

## 2023-02-10 DIAGNOSIS — K90.9 MALABSORPTION SYNDROME: Primary | ICD-10-CM

## 2023-02-10 DIAGNOSIS — E66.01 MORBID OBESITY (HCC): ICD-10-CM

## 2023-02-10 DIAGNOSIS — D50.9 IRON DEFICIENCY ANEMIA, UNSPECIFIED IRON DEFICIENCY ANEMIA TYPE: ICD-10-CM

## 2023-02-10 DIAGNOSIS — Z98.84 HISTORY OF ROUX-EN-Y GASTRIC BYPASS: ICD-10-CM

## 2023-02-10 LAB
EST. AVERAGE GLUCOSE BLD GHB EST-MCNC: 134 MG/DL
HBA1C MFR BLD: 6.3 %

## 2023-02-10 RX ORDER — SODIUM CHLORIDE 9 MG/ML
20 INJECTION, SOLUTION INTRAVENOUS ONCE
OUTPATIENT
Start: 2023-04-03

## 2023-02-10 NOTE — PROGRESS NOTES
[facility] HEMATOLOGY ONCOLOGY [address] [facility]   [address]  [facility] [id number] [phone]  Hematology Ambulatory [address] [facility], 1955, [phone]  2/10/2023    Assessment/Plan:    1  Malabsorption syndrome  2  History of Hima-en-Y gastric bypass  3  Iron deficiency anemia, unspecified iron deficiency anemia type  4  Morbid obesity ([address] )   Patient is a 72-year-old female with a history of hyperlipidemia, hypertension, hypothyroid, type 2 diabetes, and Hima-en-Y gastric bypass in 2013  She was initially seen by me for iron deficiency anemia  Patient was anticipated to have abdominoplasty in November 2022 however was canceled secondary to anemia after hemoglobin was 7 2, iron saturation 3% and ferritin level 3  Patient underwent Feraheme infusions on 11/28/2022 tolerating without difficulty  Most recent hemoglobin is 12 8 g/dL  Iron saturation 10% and ferritin level 10  Patient is expected to undergo abdominoplasty on 2/15/2023  Given her hemoglobin is improved to 12 8 I suspect this is reasonable  We discussed starting maintenance infusions with Feraheme  She would prefer to wait until early April to give her time to recover from surgery  We will plan for maintenance infusions every 56 days monitoring labs every 3 months  We will plan to see her in 1 year  Patient verbalized understanding and is in agreement with the plan  - CBC; Standing  - Comprehensive metabolic panel; Standing  - Iron Panel (Includes Ferritin, Iron Sat%, Iron, and TIBC); Standing  - CBC  - Comprehensive metabolic panel  - Iron Panel (Includes Ferritin, Iron Sat%, Iron, and TIBC)    Barrier(s) to care: None  The patient is able to self care      [address] [facility]    Interval history: Clinically stable    Review of Systems   Constitutional: Negative for activity change, appetite change, fatigue, fever and unexpected weight change  Respiratory: Negative for cough and shortness of breath  Cardiovascular: Negative for chest pain and leg swelling  Gastrointestinal: Negative for abdominal pain, constipation, diarrhea and nausea  Endocrine: Negative for cold intolerance and heat intolerance  Musculoskeletal: Negative for arthralgias and myalgias  Skin: Negative  Neurological: Negative for dizziness, weakness and headaches  Hematological: Negative for adenopathy  Does not bruise/bleed easily         Patient Active Problem List   Diagnosis   • Morbid obesity ([place] [address] )   • Diabetes mellitus type 2 in obese ([place] [address] )   • HTN (hypertension)   • Hyperlipidemia   • Hypothyroidism   • Sleep apnea   • Lipodystrophy   • History of Hima-en-Y gastric bypass   • Malabsorption syndrome   • Iron deficiency anemia       Past Medical History:   Diagnosis Date   • Anemia    • Anesthesia complication     Facial swelling   • Diabetes mellitus ([place] [address] )    • Disease of thyroid gland    • Hyperlipidemia    • Hypertension    • Hypothyroidism    • Morbid obesity ([place] [address] )    • Pancreatitis 09/1997   • Sleep apnea     no cpap machine used   • Type 2 diabetes mellitus ([place] [address] )    • Vitamin D deficiency        Past Surgical History:   Procedure Laterality Date   • BREAST EXCISIONAL BIOPSY Left 07/2001    benign   • CHOLECYSTECTOMY  04/2013   • COLONOSCOPY     • EGD     • GASTRIC BYPASS  05/2013   • JOINT REPLACEMENT     • TONSILLECTOMY     • TOTAL KNEE ARTHROPLASTY Left 2013       Family History   Problem Relation Age of Onset   • Breast cancer Mother    • Lung cancer Mother    • Brain cancer Mother    • Heart attack Father    • Stroke Sister    • No Known Problems Sister    • No Known Problems Sister    • No Known Problems Sister    • No Known Problems Sister    • No Known Problems Daughter    • No Known Problems Maternal Grandmother    • No Known Problems Maternal Grandfather    • Cancer Paternal Grandmother         unknown origin    • Cancer Paternal Grandfather         unknown origin    • Breast cancer Maternal Aunt    • Breast cancer Maternal Aunt    • Cancer Paternal Aunt         unknown origin   • Cancer Paternal Aunt         unknown origin   • Cancer Paternal Aunt         unknown origin   • Lung cancer Paternal Aunt    • BRCA2 Positive Neg Hx    • BRCA2 Negative Neg Hx    • BRCA1 Positive Neg Hx    • Breast cancer additional onset Neg Hx    • BRCA1 Negative Neg Hx    • BRCA 1/2 Neg Hx    • Endometrial cancer Neg Hx    • Ovarian cancer Neg Hx    • Colon cancer Neg Hx        Social History     Socioeconomic History   • Marital status: /Civil Union     Spouse name: Not on file   • Number of children: Not on file   • Years of education: Not on file   • Highest education level: Not on file   Occupational History   • Not on file   Tobacco Use   • Smoking status: Never   • Smokeless tobacco: Never   Vaping Use   • Vaping Use: Never used   Substance and Sexual Activity   • Alcohol use: Not Currently     Comment: social   • Drug use: Never   • Sexual activity: Yes     Partners: Male   Other Topics Concern   • Not on file   Social History Narrative   • Not on file     Social Determinants of Health     Financial Resource Strain: Not on file   Food Insecurity: Not on file   Transportation Needs: Not on file   Physical Activity: Not on file   Stress: Not on file   Social Connections: Not on file   Intimate Partner Violence: Not on file   Housing Stability: Not on file         Current Outpatient Medications:   •  amLODIPine (NORVASC) 5 mg tablet, Take 1 tablet (5 mg total) by mouth daily, Disp: 90 tablet, Rfl: 1  •  b complex vitamins capsule, Take 1 capsule by mouth daily, Disp: , Rfl:   •  Blood Glucose Monitoring Suppl ([employer] Blood Glucose Monitor) CARLOS, Use in the morning, Disp: 1 each, Rfl: 0  •  carvedilol (COREG) 6 25 mg tablet, Take 1 tablet (6 25 mg total) by mouth 2 (two) times a day, Disp: 180 tablet, Rfl: 1  •  ergocalciferol (VITAMIN D2) 50,000 units, Take 1 capsule (50,000 Units total) by mouth once a week, Disp: 12 capsule, Rfl: 2  •  ezetimibe (ZETIA) 10 mg tablet, Take 1 tablet (10 mg total) by mouth daily, Disp: 90 tablet, Rfl: 1  •  glucose blood test strip, Use 1 each daily Use as instructed, Disp: 100 strip, Rfl: 11  •  hydrochlorothiazide (HYDRODIURIL) 25 mg tablet, Take 1 tablet (25 mg total) by mouth daily, Disp: 90 tablet, Rfl: 1  •  Januvia 50 MG tablet, take 1 tablet by mouth once daily, Disp: 90 tablet, Rfl: 3  •  Lancets MISC, Use daily, Disp: 100 each, Rfl: 11  •  levothyroxine 125 mcg tablet, Take 1 tablet (125 mcg total) by mouth daily, Disp: 90 tablet, Rfl: 1  •  metFORMIN (GLUCOPHAGE-XR) 500 mg 24 hr tablet, Take 4 tablets (2,000 mg total) by mouth daily with breakfast, Disp: 360 tablet, Rfl: 1  •  Multiple Vitamin (multivitamin) tablet, Take 1 tablet by mouth daily, Disp: , Rfl:     Allergies   Allergen Reactions   • Aspirin Angioedema   • Codeine Angioedema   • Nsaids Angioedema   • Statins Other (See Comments) and Angioedema     Patient states allergic to statins   • Wound Dressing Adhesive Rash       Objective:  /68 (BP Location: Right arm, Patient Position: Sitting, Cuff Size: Standard)   Pulse 70   Temp (!) 97 1 °F (36 2 °C) (Tympanic)   Ht 5' 4 5" (1 638 m)   SpO2 98%   BMI 37 18 kg/m²    Physical Exam  Vitals reviewed  Constitutional:       Appearance: Normal appearance  She is well-developed  HENT:      Head: Normocephalic and atraumatic  Eyes:      Conjunctiva/sclera: Conjunctivae normal       Pupils: Pupils are equal, round, and reactive to light  Pulmonary:      Effort: Pulmonary effort is normal  No respiratory distress  Musculoskeletal:         General: Normal range of motion  Cervical back: Normal range of motion  Lymphadenopathy:      Cervical: No cervical adenopathy  Skin:     General: Skin is dry  Neurological:      Mental Status: She is alert and oriented to person, place, and time  Psychiatric:         Behavior: Behavior normal          Result Review  Labs:  Appointment on 02/08/2023   Component Date Value Ref Range Status   • Hemoglobin A1C 02/08/2023 6 3 (H)  Normal 3 8-5 6%; PreDiabetic 5 7-6 4%; Diabetic >=6 5%; Glycemic control for adults with diabetes <7 0% % Final   • EAG 02/08/2023 134  mg/dl Final   • Sodium 02/08/2023 140  135 - 147 mmol/L Final   • Potassium 02/08/2023 3 9  3 5 - 5 3 mmol/L Final   • Chloride 02/08/2023 103  96 - 108 mmol/L Final   • CO2 02/08/2023 30  21 - 32 mmol/L Final   • ANION GAP 02/08/2023 7  4 - 13 mmol/L Final   • BUN 02/08/2023 14  5 - 25 mg/dL Final   • Creatinine 02/08/2023 0 91  0 60 - 1 30 mg/dL Final    Standardized to IDMS reference method   • Glucose, Fasting 02/08/2023 110 (H)  65 - 99 mg/dL Final    Specimen collection should occur prior to Sulfasalazine administration due to the potential for falsely depressed results  Specimen collection should occur prior to Sulfapyridine administration due to the potential for falsely elevated results  • Calcium 02/08/2023 9 6  8 4 - 10 2 mg/dL Final   • AST 02/08/2023 15  13 - 39 U/L Final    Specimen collection should occur prior to Sulfasalazine administration due to the potential for falsely depressed results  • ALT 02/08/2023 13  7 - 52 U/L Final    Specimen collection should occur prior to Sulfasalazine administration due to the potential for falsely depressed results      • Alkaline Phosphatase 02/08/2023 69  34 - 104 U/L Final   • Total Protein 02/08/2023 7 0  6 4 - 8 4 g/dL Final   • Albumin 02/08/2023 3 9  3 5 - 5 0 g/dL Final   • Total Bilirubin 02/08/2023 0 94  0 20 - 1 00 mg/dL Final   • eGFR 02/08/2023 66  ml/min/1 73sq m Final   • WBC 02/08/2023 7 40  4 31 - 10 16 Thousand/uL Final   • RBC 02/08/2023 4 84  3 81 - 5 12 Million/uL Final   • Hemoglobin 02/08/2023 12 5  11 5 - 15 4 g/dL Final   • Hematocrit 02/08/2023 38 9  34 8 - 46 1 % Final   • MCV 02/08/2023 80 (L)  82 - 98 fL Final   • MCH 02/08/2023 25 8 (L)  26 8 - 34 3 pg Final   • MCHC 02/08/2023 32 1  31 4 - 37 4 g/dL Final   • RDW 02/08/2023 18 5 (H)  11 6 - 15 1 % Final   • MPV 02/08/2023 8 7 (L)  8 9 - 12 7 fL Final   • Platelets 10/29/1602 308  149 - 390 Thousands/uL Final   • nRBC 02/08/2023 0  /100 WBCs Final   • Neutrophils Relative 02/08/2023 65  43 - 75 % Final   • Immat GRANS % 02/08/2023 0  0 - 2 % Final   • Lymphocytes Relative 02/08/2023 24  14 - 44 % Final   • Monocytes Relative 02/08/2023 7  4 - 12 % Final   • Eosinophils Relative 02/08/2023 4  0 - 6 % Final   • Basophils Relative 02/08/2023 0  0 - 1 % Final   • Neutrophils Absolute 02/08/2023 4 82  1 85 - 7 62 Thousands/µL Final   • Immature Grans Absolute 02/08/2023 0 02  0 00 - 0 20 Thousand/uL Final   • Lymphocytes Absolute 02/08/2023 1 76  0 60 - 4 47 Thousands/µL Final   • Monocytes Absolute 02/08/2023 0 51  0 17 - 1 22 Thousand/µL Final   • Eosinophils Absolute 02/08/2023 0 27  0 00 - 0 61 Thousand/µL Final   • Basophils Absolute 02/08/2023 0 02  0 00 - 0 10 Thousands/µL Final   • TSH 3RD GENERATON 02/08/2023 2 796  0 450 - 4 500 uIU/mL Final    The recommended reference ranges for TSH during pregnancy are as follows:   First trimester 0 1 to 2 5 uIU/mL   Second trimester  0 2 to 3 0 uIU/mL   Third trimester 0 3 to 3 0 uIU/m    Note: Normal ranges may not apply to patients who are transgender, non-binary, or whose legal sex, sex at birth, and gender identity differ  Adult TSH (3rd generation) reference range follows the recommended guidelines of the American Thyroid Association, January, 2020     • Vit D, 25-Hydroxy 02/08/2023 70 8  30 0 - 100 0 ng/mL Final   • Cholesterol 02/08/2023 169  See Comment mg/dL Final    Cholesterol:         Pediatric <18 Years        Desirable          <170 mg/dL      Borderline High    170-199 mg/dL      High               >=200 mg/dL        Adult >=18 Years            Desirable         <200 mg/dL      Borderline High   200-239 mg/dL High              >239 mg/dL     • Triglycerides 02/08/2023 95  See Comment mg/dL Final    Triglyceride:     0-9Y            <75mg/dL     10Y-17Y         <90 mg/dL       >=18Y     Normal          <150 mg/dL     Borderline High 150-199 mg/dL     High            200-499 mg/dL        Very High       >499 mg/dL    Specimen collection should occur prior to N-Acetylcysteine or Metamizole administration due to the potential for falsely depressed results  • HDL, Direct 02/08/2023 57  >=50 mg/dL Final   • LDL Calculated 02/08/2023 93  0 - 100 mg/dL Final    LDL Cholesterol:     Optimal           <100 mg/dl     Near Optimal      100-129 mg/dl     Above Optimal       Borderline High 130-159 mg/dl       High            160-189 mg/dl       Very High       >189 mg/dl         This screening LDL is a calculated result  It does not have the accuracy of the Direct Measured LDL in the monitoring of patients with hyperlipidemia and/or statin therapy  Direct Measure LDL (GRJ097) must be ordered separately in these patients     • Protime 02/08/2023 13 4  11 6 - 14 5 seconds Final   • INR 02/08/2023 1 00  0 84 - 1 19 Final   • PTT 02/08/2023 29  23 - 37 seconds Final    Therapeutic Heparin Range =  60-90 seconds   Appointment on 01/12/2023   Component Date Value Ref Range Status   • WBC 01/12/2023 9 46  4 31 - 10 16 Thousand/uL Final   • RBC 01/12/2023 5 26 (H)  3 81 - 5 12 Million/uL Final   • Hemoglobin 01/12/2023 12 8  11 5 - 15 4 g/dL Final   • Hematocrit 01/12/2023 41 5  34 8 - 46 1 % Final   • MCV 01/12/2023 79 (L)  82 - 98 fL Final   • MCH 01/12/2023 24 3 (L)  26 8 - 34 3 pg Final   • MCHC 01/12/2023 30 8 (L)  31 4 - 37 4 g/dL Final   • RDW 01/12/2023 22 5 (H)  11 6 - 15 1 % Final   • Platelets 03/02/6551 343  149 - 390 Thousands/uL Final   • MPV 01/12/2023 8 8 (L)  8 9 - 12 7 fL Final   • Iron Saturation 01/12/2023 10 (L)  15 - 50 % Final   • TIBC 01/12/2023 386  250 - 450 ug/dL Final   • Iron 01/12/2023 40 (L)  50 - 170 ug/dL Final    Patients treated with metal-binding drugs (ie  Deferoxamine) may have depressed iron values  • Ferritin 01/12/2023 10  8 - 388 ng/mL Final         Please note: This report has been generated by a voice recognition software system  Therefore there may be syntax, spelling, and/or grammatical errors  Please call if you have any questions

## 2023-02-10 NOTE — TELEPHONE ENCOUNTER
Left voice message with 2 infusions & follow up  Gave [facility] teams # to call with any questions   Try calling again on Monday

## 2023-02-10 NOTE — TELEPHONE ENCOUNTER
While we try to accommodate patient requests, our priority is to schedule treatment according to Doctor's orders and site availability  1  Does the Provider use the intake sheet or checkout note? NO   2  What would be a preferred day of the week that would work best for your infusion appointment? Any   3  Do you prefer mornings or afternoons for your appointments? AM   4  Are there any days or dates that do not work for your schedule, including any upcoming vacations? NO   We are going to try our best to schedule you at the infusion center closest to your home  In the event that we are unable to what would be your next preferred infusion site or sites? [facility]     5  Do you have transportation to take you to all of your appointments? Yes   6   Would you like the infusion center to draw labs from your port? (disregard if patient doesn't have a port or need labs for infusion appointment)No       To start in April per [facility]

## 2023-02-10 NOTE — TELEPHONE ENCOUNTER
Please schedule patient for treatments to start in April  Any day in the morning is preferred  Thank you!

## 2023-02-13 DIAGNOSIS — K90.9 MALABSORPTION SYNDROME: ICD-10-CM

## 2023-02-13 DIAGNOSIS — E66.01 MORBID OBESITY (HCC): ICD-10-CM

## 2023-02-13 DIAGNOSIS — Z98.84 HISTORY OF ROUX-EN-Y GASTRIC BYPASS: Primary | ICD-10-CM

## 2023-02-13 DIAGNOSIS — D50.9 IRON DEFICIENCY ANEMIA, UNSPECIFIED IRON DEFICIENCY ANEMIA TYPE: ICD-10-CM

## 2023-02-14 ENCOUNTER — ANESTHESIA EVENT (OUTPATIENT)
Dept: PERIOP | Facility: HOSPITAL | Age: 68
End: 2023-02-14

## 2023-02-14 NOTE — TELEPHONE ENCOUNTER
Patient confirmed first iron infusion, per patient will like infusion to call her directly to reschedule second infusion due to going every other month not monthly  On treatment plan it states Q56 days, please call patient to reschedule  Per patient if she doesn't answer, please leave a message on answering machine and she will return your call as soon as possible  Thanks!

## 2023-02-14 NOTE — H&P
H&P Exam - Greg Santiago 74 y o  female MRN: [id number]    Unit/Bed#:  Encounter: [id number]    Assessment:  Lipodystrophy of the abdomen    Plan:  Circumferential abdominoplasty    History of Present Illness   This is a 79-year-old female who lost approximately 100 pounds after gastric bypass surgery  Patient was originally scheduled for surgery approximately 3 months ago however her hemoglobin and hematocrit had dropped significantly over 2-week period  Since then the patient has been under the care of a hematologist and has had iron infusions she has multiple layers of skin and fat that hang down over her anterior thighs with trapped timidity in the suprapubic and groin folds    Review of Systems   All other systems reviewed and are negative        Historical Information   Past Medical History:   Diagnosis Date   • Anemia    • Anesthesia complication     Facial swelling   • Diabetes mellitus ([facility] [address] )    • Disease of thyroid gland    • Hyperlipidemia    • Hypertension    • Hypothyroidism    • Morbid obesity ([facility] [address] )    • Pancreatitis 09/1997   • Sleep apnea     no cpap machine used   • Type 2 diabetes mellitus ([facility] )    • Vitamin D deficiency      Past Surgical History:   Procedure Laterality Date   • BREAST EXCISIONAL BIOPSY Left 07/2001    benign   • CHOLECYSTECTOMY  04/2013   • COLONOSCOPY     • EGD     • GASTRIC BYPASS  05/2013   • JOINT REPLACEMENT     • TONSILLECTOMY     • TOTAL KNEE ARTHROPLASTY Left 2013     Social History   Social History     Substance and Sexual Activity   Alcohol Use Not Currently    Comment: social     Social History     Substance and Sexual Activity   Drug Use Never     Social History     Tobacco Use   Smoking Status Never   Smokeless Tobacco Never     E-Cigarette Use: Never User     E-Cigarette/Vaping Substances   • Nicotine No    • THC No    • CBD No    • Flavoring No    • Other No    • Unknown No        Family History: non-contributory    Meds/Allergies   all medications and allergies reviewed  Allergies   Allergen Reactions   • Aspirin Angioedema   • Codeine Angioedema   • Nsaids Angioedema   • Statins Other (See Comments) and Angioedema     Patient states allergic to statins   • Wound Dressing Adhesive Rash       Objective   First Vitals:        Current Vitals:        No intake or output data in the 24 hours ending 02/14/23 1830    Invasive Devices     None                 Physical Exam examination of the head ears eyes nose and throat is within normal limits heart sounds S1-S2 heard no murmurs or gallops lungs clear abdomen soft extremities within normal limits abdomen is as above  Lab Results:   Imaging:   EKG, Pathology, and Other Studies:     Code Status: No Order  Advance Directive and Living Will:      Power of :    POLST:      Counseling / Coordination of Care:   None

## 2023-02-15 ENCOUNTER — HOSPITAL ENCOUNTER (OUTPATIENT)
Facility: HOSPITAL | Age: 68
Setting detail: OBSERVATION
Discharge: HOME | End: 2023-02-16
Attending: PLASTIC SURGERY | Admitting: PLASTIC SURGERY

## 2023-02-15 ENCOUNTER — ANESTHESIA (OUTPATIENT)
Dept: PERIOP | Facility: HOSPITAL | Age: 68
End: 2023-02-15

## 2023-02-15 DIAGNOSIS — E88.1 LIPODYSTROPHY: Primary | ICD-10-CM

## 2023-02-15 LAB
GLUCOSE SERPL-MCNC: 120 MG/DL (ref 65–140)
GLUCOSE SERPL-MCNC: 179 MG/DL (ref 65–140)

## 2023-02-15 RX ORDER — PROPOFOL 10 MG/ML
INJECTION, EMULSION INTRAVENOUS CONTINUOUS PRN
Status: DISCONTINUED | OUTPATIENT
Start: 2023-02-15 | End: 2023-02-15

## 2023-02-15 RX ORDER — FENTANYL CITRATE 50 UG/ML
INJECTION, SOLUTION INTRAMUSCULAR; INTRAVENOUS AS NEEDED
Status: DISCONTINUED | OUTPATIENT
Start: 2023-02-15 | End: 2023-02-15

## 2023-02-15 RX ORDER — ROCURONIUM BROMIDE 10 MG/ML
INJECTION, SOLUTION INTRAVENOUS AS NEEDED
Status: DISCONTINUED | OUTPATIENT
Start: 2023-02-15 | End: 2023-02-15

## 2023-02-15 RX ORDER — DEXAMETHASONE SODIUM PHOSPHATE 10 MG/ML
INJECTION, SOLUTION INTRAMUSCULAR; INTRAVENOUS AS NEEDED
Status: DISCONTINUED | OUTPATIENT
Start: 2023-02-15 | End: 2023-02-15

## 2023-02-15 RX ORDER — CEFAZOLIN SODIUM 2 G/50ML
SOLUTION INTRAVENOUS AS NEEDED
Status: DISCONTINUED | OUTPATIENT
Start: 2023-02-15 | End: 2023-02-15

## 2023-02-15 RX ORDER — GLYCOPYRROLATE 0.2 MG/ML
INJECTION INTRAMUSCULAR; INTRAVENOUS AS NEEDED
Status: DISCONTINUED | OUTPATIENT
Start: 2023-02-15 | End: 2023-02-15

## 2023-02-15 RX ORDER — DIPHENHYDRAMINE HYDROCHLORIDE 50 MG/ML
12.5 INJECTION INTRAMUSCULAR; INTRAVENOUS ONCE AS NEEDED
Status: DISCONTINUED | OUTPATIENT
Start: 2023-02-15 | End: 2023-02-15 | Stop reason: HOSPADM

## 2023-02-15 RX ORDER — PROPOFOL 10 MG/ML
INJECTION, EMULSION INTRAVENOUS AS NEEDED
Status: DISCONTINUED | OUTPATIENT
Start: 2023-02-15 | End: 2023-02-15

## 2023-02-15 RX ORDER — SUCCINYLCHOLINE/SOD CL,ISO/PF 100 MG/5ML
SYRINGE (ML) INTRAVENOUS AS NEEDED
Status: DISCONTINUED | OUTPATIENT
Start: 2023-02-15 | End: 2023-02-15

## 2023-02-15 RX ORDER — DIPHENHYDRAMINE HYDROCHLORIDE 50 MG/ML
INJECTION INTRAMUSCULAR; INTRAVENOUS AS NEEDED
Status: DISCONTINUED | OUTPATIENT
Start: 2023-02-15 | End: 2023-02-15

## 2023-02-15 RX ORDER — MIDAZOLAM HYDROCHLORIDE 2 MG/2ML
INJECTION, SOLUTION INTRAMUSCULAR; INTRAVENOUS AS NEEDED
Status: DISCONTINUED | OUTPATIENT
Start: 2023-02-15 | End: 2023-02-15

## 2023-02-15 RX ORDER — MAGNESIUM HYDROXIDE 1200 MG/15ML
LIQUID ORAL AS NEEDED
Status: DISCONTINUED | OUTPATIENT
Start: 2023-02-15 | End: 2023-02-15 | Stop reason: HOSPADM

## 2023-02-15 RX ORDER — LIDOCAINE HYDROCHLORIDE AND EPINEPHRINE 5; 5 MG/ML; UG/ML
INJECTION, SOLUTION INFILTRATION; PERINEURAL AS NEEDED
Status: DISCONTINUED | OUTPATIENT
Start: 2023-02-15 | End: 2023-02-15 | Stop reason: HOSPADM

## 2023-02-15 RX ORDER — SODIUM CHLORIDE, SODIUM LACTATE, POTASSIUM CHLORIDE, CALCIUM CHLORIDE 600; 310; 30; 20 MG/100ML; MG/100ML; MG/100ML; MG/100ML
125 INJECTION, SOLUTION INTRAVENOUS CONTINUOUS
Status: DISCONTINUED | OUTPATIENT
Start: 2023-02-15 | End: 2023-02-16 | Stop reason: HOSPADM

## 2023-02-15 RX ORDER — FENTANYL CITRATE/PF 50 MCG/ML
50 SYRINGE (ML) INJECTION
Status: DISCONTINUED | OUTPATIENT
Start: 2023-02-15 | End: 2023-02-15 | Stop reason: HOSPADM

## 2023-02-15 RX ORDER — ONDANSETRON 2 MG/ML
INJECTION INTRAMUSCULAR; INTRAVENOUS AS NEEDED
Status: DISCONTINUED | OUTPATIENT
Start: 2023-02-15 | End: 2023-02-15

## 2023-02-15 RX ORDER — LIDOCAINE HYDROCHLORIDE 10 MG/ML
0.5 INJECTION, SOLUTION EPIDURAL; INFILTRATION; INTRACAUDAL; PERINEURAL ONCE AS NEEDED
Status: DISCONTINUED | OUTPATIENT
Start: 2023-02-15 | End: 2023-02-15 | Stop reason: HOSPADM

## 2023-02-15 RX ORDER — ONDANSETRON 2 MG/ML
4 INJECTION INTRAMUSCULAR; INTRAVENOUS EVERY 6 HOURS PRN
Status: DISCONTINUED | OUTPATIENT
Start: 2023-02-15 | End: 2023-02-16 | Stop reason: HOSPADM

## 2023-02-15 RX ORDER — DEXTROSE MONOHYDRATE AND SODIUM CHLORIDE 5; .45 G/100ML; G/100ML
125 INJECTION, SOLUTION INTRAVENOUS CONTINUOUS
Status: DISCONTINUED | OUTPATIENT
Start: 2023-02-15 | End: 2023-02-16 | Stop reason: HOSPADM

## 2023-02-15 RX ORDER — NEOSTIGMINE METHYLSULFATE 1 MG/ML
INJECTION INTRAVENOUS AS NEEDED
Status: DISCONTINUED | OUTPATIENT
Start: 2023-02-15 | End: 2023-02-15

## 2023-02-15 RX ORDER — SODIUM CHLORIDE, SODIUM LACTATE, POTASSIUM CHLORIDE, CALCIUM CHLORIDE 600; 310; 30; 20 MG/100ML; MG/100ML; MG/100ML; MG/100ML
INJECTION, SOLUTION INTRAVENOUS CONTINUOUS PRN
Status: DISCONTINUED | OUTPATIENT
Start: 2023-02-15 | End: 2023-02-15

## 2023-02-15 RX ORDER — OXYCODONE HYDROCHLORIDE AND ACETAMINOPHEN 5; 325 MG/1; MG/1
1 TABLET ORAL EVERY 4 HOURS PRN
Status: DISCONTINUED | OUTPATIENT
Start: 2023-02-15 | End: 2023-02-16 | Stop reason: HOSPADM

## 2023-02-15 RX ORDER — ONDANSETRON 2 MG/ML
4 INJECTION INTRAMUSCULAR; INTRAVENOUS ONCE AS NEEDED
Status: DISCONTINUED | OUTPATIENT
Start: 2023-02-15 | End: 2023-02-15 | Stop reason: HOSPADM

## 2023-02-15 RX ORDER — DIPHENHYDRAMINE HYDROCHLORIDE 50 MG/ML
25 INJECTION INTRAMUSCULAR; INTRAVENOUS EVERY 6 HOURS PRN
Status: DISCONTINUED | OUTPATIENT
Start: 2023-02-15 | End: 2023-02-16 | Stop reason: HOSPADM

## 2023-02-15 RX ORDER — CEFAZOLIN SODIUM 2 G/50ML
2000 SOLUTION INTRAVENOUS ONCE
Status: DISCONTINUED | OUTPATIENT
Start: 2023-02-15 | End: 2023-02-15 | Stop reason: HOSPADM

## 2023-02-15 RX ORDER — CEFAZOLIN SODIUM 1 G/50ML
1000 SOLUTION INTRAVENOUS EVERY 8 HOURS
Status: COMPLETED | OUTPATIENT
Start: 2023-02-15 | End: 2023-02-16

## 2023-02-15 RX ORDER — HYDROMORPHONE HCL/PF 1 MG/ML
1 SYRINGE (ML) INJECTION EVERY 2 HOUR PRN
Status: DISCONTINUED | OUTPATIENT
Start: 2023-02-15 | End: 2023-02-16 | Stop reason: HOSPADM

## 2023-02-15 RX ORDER — LIDOCAINE HYDROCHLORIDE 10 MG/ML
INJECTION, SOLUTION EPIDURAL; INFILTRATION; INTRACAUDAL; PERINEURAL AS NEEDED
Status: DISCONTINUED | OUTPATIENT
Start: 2023-02-15 | End: 2023-02-15

## 2023-02-15 RX ADMIN — SODIUM CHLORIDE, SODIUM LACTATE, POTASSIUM CHLORIDE, AND CALCIUM CHLORIDE 125 ML/HR: .6; .31; .03; .02 INJECTION, SOLUTION INTRAVENOUS at 07:45

## 2023-02-15 RX ADMIN — CEFAZOLIN SODIUM 2000 MG: 2 SOLUTION INTRAVENOUS at 09:40

## 2023-02-15 RX ADMIN — ROCURONIUM BROMIDE 10 MG: 10 INJECTION, SOLUTION INTRAVENOUS at 13:32

## 2023-02-15 RX ADMIN — FENTANYL CITRATE 50 MCG: 50 INJECTION, SOLUTION INTRAMUSCULAR; INTRAVENOUS at 12:45

## 2023-02-15 RX ADMIN — OXYCODONE HYDROCHLORIDE AND ACETAMINOPHEN 1 TABLET: 5; 325 TABLET ORAL at 17:03

## 2023-02-15 RX ADMIN — SODIUM CHLORIDE, SODIUM LACTATE, POTASSIUM CHLORIDE, AND CALCIUM CHLORIDE: .6; .31; .03; .02 INJECTION, SOLUTION INTRAVENOUS at 15:09

## 2023-02-15 RX ADMIN — DEXTROSE AND SODIUM CHLORIDE 125 ML/HR: 5; .45 INJECTION, SOLUTION INTRAVENOUS at 22:32

## 2023-02-15 RX ADMIN — SODIUM CHLORIDE, SODIUM LACTATE, POTASSIUM CHLORIDE, AND CALCIUM CHLORIDE: .6; .31; .03; .02 INJECTION, SOLUTION INTRAVENOUS at 14:12

## 2023-02-15 RX ADMIN — ONDANSETRON 4 MG: 2 INJECTION INTRAMUSCULAR; INTRAVENOUS at 15:02

## 2023-02-15 RX ADMIN — DEXTROSE AND SODIUM CHLORIDE 125 ML/HR: 5; .45 INJECTION, SOLUTION INTRAVENOUS at 17:03

## 2023-02-15 RX ADMIN — HYDROMORPHONE HYDROCHLORIDE 1 MG: 1 INJECTION, SOLUTION INTRAMUSCULAR; INTRAVENOUS; SUBCUTANEOUS at 19:34

## 2023-02-15 RX ADMIN — PROPOFOL 150 MG: 10 INJECTION, EMULSION INTRAVENOUS at 09:47

## 2023-02-15 RX ADMIN — MIDAZOLAM 2 MG: 1 INJECTION INTRAMUSCULAR; INTRAVENOUS at 09:39

## 2023-02-15 RX ADMIN — OXYCODONE HYDROCHLORIDE AND ACETAMINOPHEN 1 TABLET: 5; 325 TABLET ORAL at 21:10

## 2023-02-15 RX ADMIN — FENTANYL CITRATE 50 MCG: 50 INJECTION, SOLUTION INTRAMUSCULAR; INTRAVENOUS at 13:45

## 2023-02-15 RX ADMIN — ROCURONIUM BROMIDE 40 MG: 10 INJECTION, SOLUTION INTRAVENOUS at 09:55

## 2023-02-15 RX ADMIN — DEXAMETHASONE SODIUM PHOSPHATE 10 MG: 10 INJECTION, SOLUTION INTRAMUSCULAR; INTRAVENOUS at 09:58

## 2023-02-15 RX ADMIN — SODIUM CHLORIDE, SODIUM LACTATE, POTASSIUM CHLORIDE, AND CALCIUM CHLORIDE: .6; .31; .03; .02 INJECTION, SOLUTION INTRAVENOUS at 09:40

## 2023-02-15 RX ADMIN — CEFAZOLIN SODIUM 2000 MG: 2 SOLUTION INTRAVENOUS at 13:38

## 2023-02-15 RX ADMIN — ROCURONIUM BROMIDE 20 MG: 10 INJECTION, SOLUTION INTRAVENOUS at 12:19

## 2023-02-15 RX ADMIN — NEOSTIGMINE 4 MG: 1 INJECTION INTRAVENOUS at 15:45

## 2023-02-15 RX ADMIN — CEFAZOLIN SODIUM 1000 MG: 1 SOLUTION INTRAVENOUS at 21:11

## 2023-02-15 RX ADMIN — PROPOFOL 100 MCG/KG/MIN: 10 INJECTION, EMULSION INTRAVENOUS at 10:00

## 2023-02-15 RX ADMIN — SODIUM CHLORIDE, SODIUM LACTATE, POTASSIUM CHLORIDE, AND CALCIUM CHLORIDE: .6; .31; .03; .02 INJECTION, SOLUTION INTRAVENOUS at 11:26

## 2023-02-15 RX ADMIN — FENTANYL CITRATE 50 MCG: 50 INJECTION, SOLUTION INTRAMUSCULAR; INTRAVENOUS at 09:47

## 2023-02-15 RX ADMIN — SODIUM CHLORIDE, SODIUM LACTATE, POTASSIUM CHLORIDE, AND CALCIUM CHLORIDE: .6; .31; .03; .02 INJECTION, SOLUTION INTRAVENOUS at 07:52

## 2023-02-15 RX ADMIN — DIPHENHYDRAMINE HYDROCHLORIDE 50 MG: 50 INJECTION, SOLUTION INTRAMUSCULAR; INTRAVENOUS at 09:51

## 2023-02-15 RX ADMIN — FENTANYL CITRATE 50 MCG: 50 INJECTION INTRAMUSCULAR; INTRAVENOUS at 16:27

## 2023-02-15 RX ADMIN — LIDOCAINE HYDROCHLORIDE 50 MG: 10 INJECTION, SOLUTION EPIDURAL; INFILTRATION; INTRACAUDAL; PERINEURAL at 09:47

## 2023-02-15 RX ADMIN — GLYCOPYRROLATE 0.4 MG: 0.4 INJECTION INTRAMUSCULAR; INTRAVENOUS at 15:45

## 2023-02-15 RX ADMIN — ROCURONIUM BROMIDE 10 MG: 10 INJECTION, SOLUTION INTRAVENOUS at 10:51

## 2023-02-15 RX ADMIN — FENTANYL CITRATE 50 MCG: 50 INJECTION, SOLUTION INTRAMUSCULAR; INTRAVENOUS at 15:53

## 2023-02-15 RX ADMIN — FENTANYL CITRATE 50 MCG: 50 INJECTION INTRAMUSCULAR; INTRAVENOUS at 16:11

## 2023-02-15 RX ADMIN — Medication 100 MG: at 09:47

## 2023-02-15 NOTE — OP NOTE
OPERATIVE REPORT  PATIENT NAME: Ethel Rosenberg    :  1955  MRN: [id number]  Pt Location:  OR ROOM 07    SURGERY DATE: 2/15/2023    Surgeon(s) and Role:     * Geeta James MD - Primary     * JONATHAN Marin - Assisting    Preop Diagnosis:  Encounter for cosmetic surgery [Z41 1]    Post-Op Diagnosis Codes:     * Encounter for cosmetic surgery [Z41 1]    Procedure(s):  BODY-LIFT    Specimen(s):  * No specimens in log *    Estimated Blood Loss:   Minimal    Drains:  Closed/Suction Drain Inferior; Left Back Bulb 19 Fr  (Active)   Number of days: 0       Closed/Suction Drain Inferior;Right Back Bulb 19 Fr  (Active)   Number of days: 0       Closed/Suction Drain RLQ Bulb 19 Fr  (Active)   Number of days: 0       Urethral Catheter Latex 16 Fr  (Active)   Number of days: 0       Anesthesia Type:   General    Operative Indications:  Encounter for cosmetic surgery [Z41 1]  Lipodystrophy of the abdomen status post massive weight loss    Operative Findings:  Excessive abdominal skin and fat    Complications:   None    Procedure and Technique:  Patient was draped and prepped in normal sterile fashion after being marked in the holding area for circumferential abdominoplasty  This can also be called a body lift  Patient was in the prone position and the premarked area was excised hemostasis achieved electrocautery and suture ligation  2 #19 channel drains were placed through separate stab wounds in the flank and anchored in place with 3-0 nylon suture  Fascia was approximated with 0 Ethibond sutures in the dermis with 2-0 Vicryl suture and skin with a running subcuticular 3-0 Prolene suture  Patient was then placed in the supine position redraped and prepped in normal sterile fashion  The removal of the abdominal pannus was then continued as undermined at the rib margin and xiphoid area the total amount of tissue and fat excised was approximately 15 pounds    The midline was plicated with 0 Ethibond sutures in a figure-of-eight fashion all perforators were suture-ligated  Flap was then approximated by 0 Ethibond suture for fascia 2-0 Vicryl for dermis and a running subcuticular 3-0 Prolene suture for skin prior to closure a third #19 channel drain was placed through separate stab wound in the right flank anchored in place with 3-0 nylon suture    The umbilicus was delivered through separate stab wound in the midline and anchored in place with 4-0 Vicryl buried sutures and 5-0 nylon half buried mattress sutures   I was present for the entire procedure    Patient Disposition:  PACU         SIGNATURE: Rajesh Greene MD  DATE: February 15, 2023  TIME: 3:33 PM

## 2023-02-15 NOTE — ANESTHESIA POSTPROCEDURE EVALUATION
Post-Op Assessment Note    CV Status:  Stable  Pain Score: 0    Pain management: adequate     Mental Status:  Alert and awake   Hydration Status:  Stable   PONV Controlled:  None   Airway Patency:  Patent      Post Op Vitals Reviewed: Yes      Staff: CRNA         No notable events documented      /67 (02/15/23 1556)    Temp 98 1 °F (36 7 °C) (02/15/23 1556)    Pulse (!) 50 (02/15/23 1556)   Resp 16 (02/15/23 1556)    SpO2 100 % (02/15/23 1556)

## 2023-02-15 NOTE — INTERVAL H&P NOTE
H&P reviewed  After examining the patient I find no changes in the patients condition since the H&P had been written      Vitals:    02/15/23 0712   BP: 135/90   Pulse: 70   Resp: 18   Temp: (!) 97 2 °F (36 2 °C)   SpO2: 98%

## 2023-02-15 NOTE — PLAN OF CARE
Problem: PAIN - ADULT  Goal: Verbalizes/displays adequate comfort level or baseline comfort level  Description: Interventions:  - Encourage patient to monitor pain and request assistance  - Assess pain using appropriate pain scale  - Administer analgesics based on type and severity of pain and evaluate response  - Implement non-pharmacological measures as appropriate and evaluate response  - Consider cultural and social influences on pain and pain management  - Notify physician/advanced practitioner if interventions unsuccessful or patient reports new pain  Outcome: Progressing     Problem: INFECTION - ADULT  Goal: Absence or prevention of progression during hospitalization  Description: INTERVENTIONS:  - Assess and monitor for signs and symptoms of infection  - Monitor lab/diagnostic results    - Muir appropriate cooling/warming therapies per order  - Administer medications as ordered  - Instruct and encourage patient and family to use good hand hygiene technique  - Identify and instruct in appropriate isolation precautions for identified infection/condition  Outcome: Progressing  Goal: Absence of fever/infection during neutropenic period  Description: INTERVENTIONS:  - Monitor WBC    Outcome: Progressing     Problem: SAFETY ADULT  Goal: Patient will remain free of falls  Description: INTERVENTIONS:  - Educate patient/family on patient safety including physical limitations  - Instruct patient to call for assistance with activity   - Consult OT/PT to assist with strengthening/mobility   - Keep Call bell within reach  - Keep bed low and locked with side rails adjusted as appropriate  - Keep care items and personal belongings within reach  - Initiate and maintain comfort rounds  - Make Fall Risk Sign visible to staff    - Apply yellow socks and bracelet for high fall risk patients  - Consider moving patient to room near nurses station  Outcome: Progressing  Goal: Maintain or return to baseline ADL function  Description: INTERVENTIONS:  -  Assess patient's ability to carry out ADLs; assess patient's baseline for ADL function and identify physical deficits which impact ability to perform ADLs (bathing, care of mouth/teeth, toileting, grooming, dressing, etc )  - Assess/evaluate cause of self-care deficits   - Assess range of motion  - Assess patient's mobility; develop plan if impaired  - Assess patient's need for assistive devices and provide as appropriate  - Encourage maximum independence but intervene and supervise when necessary  - Involve family in performance of ADLs  - Assess for home care needs following discharge   - Consider OT consult to assist with ADL evaluation and planning for discharge  - Provide patient education as appropriate  Outcome: Progressing  Goal: Maintains/Returns to pre admission functional level  Description: INTERVENTIONS:  - Perform BMAT or MOVE assessment daily    - Set and communicate daily mobility goal to care team and patient/family/caregiver     - Collaborate with rehabilitation services on mobility goals if consulted      - Out of bed for toileting  - Record patient progress and toleration of activity level   Outcome: Progressing     Problem: DISCHARGE PLANNING  Goal: Discharge to home or other facility with appropriate resources  Description: INTERVENTIONS:  - Identify barriers to discharge w/patient and caregiver  - Arrange for needed discharge resources and transportation as appropriate  - Identify discharge learning needs (meds, wound care, etc )  - Arrange for interpretive services to assist at discharge as needed  - Refer to Case Management Department for coordinating discharge planning if the patient needs post-hospital services based on physician/advanced practitioner order or complex needs related to functional status, cognitive ability, or social support system  Outcome: Progressing     Problem: Knowledge Deficit  Goal: Patient/family/caregiver demonstrates understanding of disease process, treatment plan, medications, and discharge instructions  Description: Complete learning assessment and assess knowledge base    Interventions:  - Provide teaching at level of understanding  - Provide teaching via preferred learning methods  Outcome: Progressing

## 2023-02-15 NOTE — ANESTHESIA PREPROCEDURE EVALUATION
Procedure:  BODY-LIFT (Abdomen)    Relevant Problems   CARDIO   (+) HTN (hypertension)   (+) Hyperlipidemia      ENDO   (+) Diabetes mellitus type 2 in obese (HCC)   (+) Hypothyroidism      HEMATOLOGY   (+) Iron deficiency anemia      PULMONARY   (+) Sleep apnea        Physical Exam    Airway    Mallampati score: II         Dental   No notable dental hx     Cardiovascular      Pulmonary      Other Findings        Anesthesia Plan  ASA Score- 2     Anesthesia Type- general with ASA Monitors  Additional Monitors:   Airway Plan: ETT  Comment: I, Dr Sharron Hurd, the attending physician, have personally seen and evaluated the patient prior to anesthetic care  I have reviewed the pre-anesthetic record, and other medical records if appropriate to the anesthetic care  If a CRNA is involved in the case, I have reviewed the CRNA assessment, if present, and agree  The patient is in a suitable condition to proceed with my formulated anesthetic plan          Plan Factors-    Chart reviewed  Induction- intravenous  Postoperative Plan-     Informed Consent- Anesthetic plan and risks discussed with patient  I personally reviewed this patient with the CRNA  Discussed and agreed on the Anesthesia Plan with the CRNA  Frutoso Spatz

## 2023-02-16 VITALS
HEIGHT: 64 IN | OXYGEN SATURATION: 99 % | DIASTOLIC BLOOD PRESSURE: 67 MMHG | WEIGHT: 220 LBS | SYSTOLIC BLOOD PRESSURE: 113 MMHG | TEMPERATURE: 97.4 F | HEART RATE: 69 BPM | BODY MASS INDEX: 37.56 KG/M2 | RESPIRATION RATE: 20 BRPM

## 2023-02-16 RX ORDER — OXYCODONE HYDROCHLORIDE AND ACETAMINOPHEN 5; 325 MG/1; MG/1
1 TABLET ORAL EVERY 4 HOURS PRN
Qty: 30 TABLET | Refills: 0 | Status: SHIPPED | OUTPATIENT
Start: 2023-02-16 | End: 2023-02-26

## 2023-02-16 RX ORDER — CEPHALEXIN 500 MG/1
500 CAPSULE ORAL EVERY 8 HOURS SCHEDULED
Qty: 21 CAPSULE | Refills: 0 | Status: SHIPPED | OUTPATIENT
Start: 2023-02-16 | End: 2023-02-23

## 2023-02-16 RX ADMIN — OXYCODONE HYDROCHLORIDE AND ACETAMINOPHEN 1 TABLET: 5; 325 TABLET ORAL at 15:26

## 2023-02-16 RX ADMIN — OXYCODONE HYDROCHLORIDE AND ACETAMINOPHEN 1 TABLET: 5; 325 TABLET ORAL at 05:12

## 2023-02-16 RX ADMIN — OXYCODONE HYDROCHLORIDE AND ACETAMINOPHEN 1 TABLET: 5; 325 TABLET ORAL at 01:14

## 2023-02-16 RX ADMIN — DEXTROSE AND SODIUM CHLORIDE 125 ML/HR: 5; .45 INJECTION, SOLUTION INTRAVENOUS at 07:09

## 2023-02-16 RX ADMIN — HYDROMORPHONE HYDROCHLORIDE 1 MG: 1 INJECTION, SOLUTION INTRAMUSCULAR; INTRAVENOUS; SUBCUTANEOUS at 07:08

## 2023-02-16 RX ADMIN — CEFAZOLIN SODIUM 1000 MG: 1 SOLUTION INTRAVENOUS at 05:02

## 2023-02-16 RX ADMIN — OXYCODONE HYDROCHLORIDE AND ACETAMINOPHEN 1 TABLET: 5; 325 TABLET ORAL at 11:30

## 2023-02-16 NOTE — PLAN OF CARE
Problem: PAIN - ADULT  Goal: Verbalizes/displays adequate comfort level or baseline comfort level  Description: Interventions:  - Encourage patient to monitor pain and request assistance  - Assess pain using appropriate pain scale  - Administer analgesics based on type and severity of pain and evaluate response  - Implement non-pharmacological measures as appropriate and evaluate response  - Consider cultural and social influences on pain and pain management  - Notify physician/advanced practitioner if interventions unsuccessful or patient reports new pain  Outcome: Progressing     Problem: INFECTION - ADULT  Goal: Absence or prevention of progression during hospitalization  Description: INTERVENTIONS:  - Assess and monitor for signs and symptoms of infection  - Monitor lab/diagnostic results    - Carmen appropriate cooling/warming therapies per order  - Administer medications as ordered  - Instruct and encourage patient and family to use good hand hygiene technique  - Identify and instruct in appropriate isolation precautions for identified infection/condition  Outcome: Progressing  Goal: Absence of fever/infection during neutropenic period  Description: INTERVENTIONS:  - Monitor WBC    Outcome: Progressing     Problem: SAFETY ADULT  Goal: Patient will remain free of falls  Description: INTERVENTIONS:  - Educate patient/family on patient safety including physical limitations  - Instruct patient to call for assistance with activity   - Consult OT/PT to assist with strengthening/mobility   - Keep Call bell within reach  - Keep bed low and locked with side rails adjusted as appropriate  - Keep care items and personal belongings within reach  - Initiate and maintain comfort rounds  - Make Fall Risk Sign visible to staff    - Apply yellow socks and bracelet for high fall risk patients  - Consider moving patient to room near nurses station  Outcome: Progressing  Goal: Maintain or return to baseline ADL function  Description: INTERVENTIONS:  -  Assess patient's ability to carry out ADLs; assess patient's baseline for ADL function and identify physical deficits which impact ability to perform ADLs (bathing, care of mouth/teeth, toileting, grooming, dressing, etc )  - Assess/evaluate cause of self-care deficits   - Assess range of motion  - Assess patient's mobility; develop plan if impaired  - Assess patient's need for assistive devices and provide as appropriate  - Encourage maximum independence but intervene and supervise when necessary  - Involve family in performance of ADLs  - Assess for home care needs following discharge   - Consider OT consult to assist with ADL evaluation and planning for discharge  - Provide patient education as appropriate  Outcome: Progressing  Goal: Maintains/Returns to pre admission functional level  Description: INTERVENTIONS:  - Perform BMAT or MOVE assessment daily    - Set and communicate daily mobility goal to care team and patient/family/caregiver     - Collaborate with rehabilitation services on mobility goals if consulted      - Out of bed for toileting  - Record patient progress and toleration of activity level   Outcome: Progressing     Problem: DISCHARGE PLANNING  Goal: Discharge to home or other facility with appropriate resources  Description: INTERVENTIONS:  - Identify barriers to discharge w/patient and caregiver  - Arrange for needed discharge resources and transportation as appropriate  - Identify discharge learning needs (meds, wound care, etc )  - Arrange for interpretive services to assist at discharge as needed  - Refer to Case Management Department for coordinating discharge planning if the patient needs post-hospital services based on physician/advanced practitioner order or complex needs related to functional status, cognitive ability, or social support system  Outcome: Progressing     Problem: Knowledge Deficit  Goal: Patient/family/caregiver demonstrates understanding of disease process, treatment plan, medications, and discharge instructions  Description: Complete learning assessment and assess knowledge base    Interventions:  - Provide teaching at level of understanding  - Provide teaching via preferred learning methods  Outcome: Progressing     Problem: Prexisting or High Potential for Compromised Skin Integrity  Goal: Skin integrity is maintained or improved  Description: INTERVENTIONS:  - Identify patients at risk for skin breakdown  - Assess and monitor skin integrity  - Assess and monitor nutrition and hydration status  - Monitor labs   - Assess for incontinence   - Turn and reposition patient  - Assist with mobility/ambulation  - Relieve pressure over bony prominences  - Avoid friction and shearing  - Provide appropriate hygiene as needed including keeping skin clean and dry  - Evaluate need for skin moisturizer/barrier cream  - Collaborate with interdisciplinary team   - Patient/family teaching  - Consider wound care consult   Outcome: Progressing     Problem: MOBILITY - ADULT  Goal: Maintain or return to baseline ADL function  Description: INTERVENTIONS:  -  Assess patient's ability to carry out ADLs; assess patient's baseline for ADL function and identify physical deficits which impact ability to perform ADLs (bathing, care of mouth/teeth, toileting, grooming, dressing, etc )  - Assess/evaluate cause of self-care deficits   - Assess range of motion  - Assess patient's mobility; develop plan if impaired  - Assess patient's need for assistive devices and provide as appropriate  - Encourage maximum independence but intervene and supervise when necessary  - Involve family in performance of ADLs  - Assess for home care needs following discharge   - Consider OT consult to assist with ADL evaluation and planning for discharge  - Provide patient education as appropriate  Outcome: Progressing  Goal: Maintains/Returns to pre admission functional level  Description: INTERVENTIONS:  - Perform BMAT or MOVE assessment daily    - Set and communicate daily mobility goal to care team and patient/family/caregiver     - Collaborate with rehabilitation services on mobility goals if consulted      - Out of bed for toileting  - Record patient progress and toleration of activity level   Outcome: Progressing

## 2023-02-16 NOTE — DISCHARGE INSTR - AVS FIRST PAGE
Day of Surgery Postoperative Instrutions    You will stay most of the next day following surgery  You will be out of bed and walking first thing in the morning  The nurses will instruct you on how to activate your drains and measure the amount of drainage  You will go home with your drains, keep track of the total amount of drainage every 12 hour period in call the office in 4 days to report the amount of drainage that is coming out  When the drainage slows down, the office staff will instruct you to come into the office at which time your drains will be removed  The day after arrive at home you may shower  To shower; remove your garment, removed all gauze and pin your drains to a lanyard around your neck so that the drains do not hang down to the floor  Wash gently with soap and water, also over the suture line it is, and then pat dry  You do not have to replace the gauze! Use a white T-shift as a dressing and tuck this into your garment (the way you would normally wear a T-shirt)  You will be given a prescription for antibiotics and pain medication  Always try to eat something before you take the medications  Do not take both medications at the same time, because this may cause nausea  Please report any adverse reactions to the medications to the office  Monitor urine color  Dark Yellow urine indicates you are not drinking enough water  Urine should be light yellow  - STAY HYDRATED and Low Sodium Diet  -   Your stitches will be removed on postoperative day 10, and your umbilicus stitches (belly button) may stay in 5 days longer  Once stitches are removed, you will begin to put scar cream on your incisions twice daily  You will be expected to massage cydney where liposuction has been performed as instructed: 5 times daily for 10 minutes for 6 weeks, as swelling subsides   When walking, try to stand as straight as possible; when lying down, put pillow under your back and under your knees so you are slightly flexed for comfort  Full wound healing takes approximately 3 months, and you are expected to use scar cream on the incisions for the same amount of time  Once your sutures are removed, we encourage that you slowly increase exercises that increase your heart rate; such as, an elliptical (without the arm movement), brisk walks and stationary bicycle  You should avoid any strenuous  exercises that invole core muscles of the abdomen for 3 months  No Heating Pads / Microwave Beads / or Ic  Do no treat yourself by any other means without contacting the office      PLEASE ADHERE STRICTLY TO THE ABOVE INSTRUCTIONS

## 2023-02-16 NOTE — PLAN OF CARE
Problem: PAIN - ADULT  Goal: Verbalizes/displays adequate comfort level or baseline comfort level  Description: Interventions:  - Encourage patient to monitor pain and request assistance  - Assess pain using appropriate pain scale  - Administer analgesics based on type and severity of pain and evaluate response  - Implement non-pharmacological measures as appropriate and evaluate response  - Consider cultural and social influences on pain and pain management  - Notify physician/advanced practitioner if interventions unsuccessful or patient reports new pain  Outcome: Progressing     Problem: SAFETY ADULT  Goal: Maintain or return to baseline ADL function  Description: INTERVENTIONS:  -  Assess patient's ability to carry out ADLs; assess patient's baseline for ADL function and identify physical deficits which impact ability to perform ADLs (bathing, care of mouth/teeth, toileting, grooming, dressing, etc )  - Assess/evaluate cause of self-care deficits   - Assess range of motion  - Assess patient's mobility; develop plan if impaired  - Assess patient's need for assistive devices and provide as appropriate  - Encourage maximum independence but intervene and supervise when necessary  - Involve family in performance of ADLs  - Assess for home care needs following discharge   - Consider OT consult to assist with ADL evaluation and planning for discharge  - Provide patient education as appropriate  Outcome: Progressing  Goal: Maintains/Returns to pre admission functional level  Description: INTERVENTIONS:  - Perform BMAT or MOVE assessment daily    - Set and communicate daily mobility goal to care team and patient/family/caregiver     - Collaborate with rehabilitation services on mobility goals if consulted      - Out of bed for toileting  - Record patient progress and toleration of activity level   Outcome: Progressing     Problem: DISCHARGE PLANNING  Goal: Discharge to home or other facility with appropriate resources  Description: INTERVENTIONS:  - Identify barriers to discharge w/patient and caregiver  - Arrange for needed discharge resources and transportation as appropriate  - Identify discharge learning needs (meds, wound care, etc )  - Arrange for interpretive services to assist at discharge as needed  - Refer to Case Management Department for coordinating discharge planning if the patient needs post-hospital services based on physician/advanced practitioner order or complex needs related to functional status, cognitive ability, or social support system  Outcome: Progressing     Problem: Prexisting or High Potential for Compromised Skin Integrity  Goal: Skin integrity is maintained or improved  Description: INTERVENTIONS:  - Identify patients at risk for skin breakdown  - Assess and monitor skin integrity  - Assess and monitor nutrition and hydration status  - Monitor labs   - Assess for incontinence   - Turn and reposition patient  - Assist with mobility/ambulation  - Relieve pressure over bony prominences  - Avoid friction and shearing  - Provide appropriate hygiene as needed including keeping skin clean and dry  - Evaluate need for skin moisturizer/barrier cream  - Collaborate with interdisciplinary team   - Patient/family teaching  - Consider wound care consult   Outcome: Progressing     Problem: MOBILITY - ADULT  Goal: Maintain or return to baseline ADL function  Description: INTERVENTIONS:  -  Assess patient's ability to carry out ADLs; assess patient's baseline for ADL function and identify physical deficits which impact ability to perform ADLs (bathing, care of mouth/teeth, toileting, grooming, dressing, etc )  - Assess/evaluate cause of self-care deficits   - Assess range of motion  - Assess patient's mobility; develop plan if impaired  - Assess patient's need for assistive devices and provide as appropriate  - Encourage maximum independence but intervene and supervise when necessary  - Involve family in performance of ADLs  - Assess for home care needs following discharge   - Consider OT consult to assist with ADL evaluation and planning for discharge  - Provide patient education as appropriate  Outcome: Progressing  Goal: Maintains/Returns to pre admission functional level  Description: INTERVENTIONS:  - Perform BMAT or MOVE assessment daily    - Set and communicate daily mobility goal to care team and patient/family/caregiver     - Collaborate with rehabilitation services on mobility goals if consulted      - Out of bed for toileting  - Record patient progress and toleration of activity level   Outcome: Progressing

## 2023-02-17 ENCOUNTER — TRANSITIONAL CARE MANAGEMENT (OUTPATIENT)
Dept: FAMILY MEDICINE CLINIC | Facility: CLINIC | Age: 68
End: 2023-02-17

## 2023-03-13 ENCOUNTER — OFFICE VISIT (OUTPATIENT)
Dept: FAMILY MEDICINE CLINIC | Facility: CLINIC | Age: 68
End: 2023-03-13

## 2023-03-13 VITALS
BODY MASS INDEX: 34.83 KG/M2 | RESPIRATION RATE: 16 BRPM | HEIGHT: 64 IN | TEMPERATURE: 97.9 F | SYSTOLIC BLOOD PRESSURE: 134 MMHG | DIASTOLIC BLOOD PRESSURE: 84 MMHG | HEART RATE: 76 BPM | WEIGHT: 204 LBS

## 2023-03-13 DIAGNOSIS — E03.9 ACQUIRED HYPOTHYROIDISM: ICD-10-CM

## 2023-03-13 DIAGNOSIS — E78.5 DYSLIPIDEMIA: ICD-10-CM

## 2023-03-13 DIAGNOSIS — D50.9 IRON DEFICIENCY ANEMIA, UNSPECIFIED IRON DEFICIENCY ANEMIA TYPE: ICD-10-CM

## 2023-03-13 DIAGNOSIS — D22.9 MULTIPLE ATYPICAL SKIN MOLES: ICD-10-CM

## 2023-03-13 DIAGNOSIS — K90.9 INTESTINAL MALABSORPTION, UNSPECIFIED TYPE: ICD-10-CM

## 2023-03-13 DIAGNOSIS — Z98.84 HISTORY OF ROUX-EN-Y GASTRIC BYPASS: ICD-10-CM

## 2023-03-13 DIAGNOSIS — E11.69 DIABETES MELLITUS TYPE 2 IN OBESE (HCC): Primary | ICD-10-CM

## 2023-03-13 DIAGNOSIS — I10 ESSENTIAL HYPERTENSION: ICD-10-CM

## 2023-03-13 DIAGNOSIS — E66.9 DIABETES MELLITUS TYPE 2 IN OBESE (HCC): Primary | ICD-10-CM

## 2023-03-13 NOTE — PROGRESS NOTES
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    Hypothyroidism euthyroid on current replacement therapy    Morbid obesity with a BMI down to 35 02 from 37 18 in January    Hypertensive cardiovascular disease with a blood pressure controlled on the current regimen    Dyslipidemia on Zetia 10 mg with a total cholesterol of 169 triglycerides of 95 HDL of 57 and LDL of 93    Problem List Items Addressed This Visit    None       There are no diagnoses linked to this encounter  No problem-specific Assessment & Plan notes found for this encounter  PHQ-2/9 Depression Screening    Little interest or pleasure in doing things: 0 - not at all  Feeling down, depressed, or hopeless: 0 - not at all  PHQ-2 Score: 0  PHQ-2 Interpretation: Negative depression screen          Body mass index is 35 02 kg/m²  BMI Counseling: Body mass index is 35 02 kg/m²  The BMI   Subjective:      Patient ID: Camilla Oliva is a 74 y o  female  Patient presents for 4-month checkup on diabetes      The following portions of the patient's history were reviewed and updated as appropriate:   She has a past medical history of Anemia, Anesthesia complication, Diabetes mellitus ([address] ), Disease of thyroid gland, Hyperlipidemia, Hypertension, Hypothyroidism, Morbid obesity ([address] ), Pancreatitis (09/1997), Sleep apnea, Type 2 diabetes mellitus ([address] ), and Vitamin D deficiency  ,  does not have any pertinent problems on file  ,   has a past surgical history that includes Gastric bypass (05/2013); Cholecystectomy (04/2013); Total knee arthroplasty (Left, 2013); Breast excisional biopsy (Left, 07/2001); Colonoscopy; EGD;  Tonsillectomy; Joint replacement; and pr excision skin abd infraumbilical panniculectomy (N/A, 2/15/2023)  ,  family history includes Brain cancer in her mother; Breast cancer in her maternal aunt, maternal aunt, and mother; Cancer in her paternal aunt, paternal aunt, paternal aunt, paternal grandfather, and paternal grandmother; Heart attack in her father; Lung cancer in her mother and paternal aunt; No Known Problems in her daughter, maternal grandfather, maternal grandmother, sister, sister, sister, and sister; Stroke in her sister  ,   reports that she has never smoked  She has never used smokeless tobacco  She reports that she does not currently use alcohol  She reports that she does not use drugs  ,  is allergic to aspirin, codeine, nsaids, statins, and wound dressing adhesive     Current Outpatient Medications   Medication Sig Dispense Refill   • amLODIPine (NORVASC) 5 mg tablet Take 1 tablet (5 mg total) by mouth daily 90 tablet 1   • b complex vitamins capsule Take 1 capsule by mouth daily     • Blood Glucose Monitoring Suppl (Fannect Blood Glucose Monitor) CARLOS Use in the morning 1 each 0   • carvedilol (COREG) 6 25 mg tablet Take 1 tablet (6 25 mg total) by mouth 2 (two) times a day 180 tablet 1   • ergocalciferol (VITAMIN D2) 50,000 units Take 1 capsule (50,000 Units total) by mouth once a week 12 capsule 2   • ezetimibe (ZETIA) 10 mg tablet Take 1 tablet (10 mg total) by mouth daily 90 tablet 1   • glucose blood test strip Use 1 each daily Use as instructed 100 strip 11   • hydrochlorothiazide (HYDRODIURIL) 25 mg tablet Take 1 tablet (25 mg total) by mouth daily 90 tablet 1   • Januvia 50 MG tablet take 1 tablet by mouth once daily 90 tablet 3   • Lancets MISC Use daily 100 each 11   • levothyroxine 125 mcg tablet Take 1 tablet (125 mcg total) by mouth daily 90 tablet 1   • metFORMIN (GLUCOPHAGE-XR) 500 mg 24 hr tablet Take 4 tablets (2,000 mg total) by mouth daily with breakfast 360 tablet 1   • Multiple Vitamin (multivitamin) tablet Take 1 tablet by mouth daily       No current facility-administered medications for this visit  Review of Systems   Constitutional: Negative for chills and fever  HENT: Negative for ear pain and sore throat  Eyes: Negative for pain and visual disturbance  Respiratory: Negative for cough and shortness of breath  Cardiovascular: Negative for chest pain and palpitations  Gastrointestinal: Negative for abdominal pain and vomiting  Genitourinary: Negative for dysuria and hematuria  Musculoskeletal: Negative for arthralgias and back pain  Skin: Negative for color change and rash  Neurological: Negative for seizures and syncope  All other systems reviewed and are negative  Objective:    /84   Pulse 76   Temp 97 9 °F (36 6 °C)   Resp 16   Ht 5' 4" (1 626 m)   Wt 92 5 kg (204 lb) Comment: verbal  BMI 35 02 kg/m²   Body mass index is 35 02 kg/m²  Physical Exam  Constitutional:       Appearance: Normal appearance  She is well-developed  HENT:      Head: Normocephalic and atraumatic  Right Ear: Tympanic membrane, ear canal and external ear normal       Left Ear: Tympanic membrane, ear canal and external ear normal       Nose: Nose normal       Mouth/Throat:      Mouth: Mucous membranes are moist       Pharynx: Oropharynx is clear  Eyes:      Extraocular Movements: Extraocular movements intact  Conjunctiva/sclera: Conjunctivae normal       Pupils: Pupils are equal, round, and reactive to light  Cardiovascular:      Rate and Rhythm: Normal rate and regular rhythm  Pulses: Normal pulses  Heart sounds: Normal heart sounds  Pulmonary:      Effort: Pulmonary effort is normal       Breath sounds: Normal breath sounds  Abdominal:      General: Abdomen is flat  Bowel sounds are normal       Palpations: Abdomen is soft  Tenderness: There is no abdominal tenderness  Musculoskeletal:         General: Normal range of motion        Cervical back: Normal range of motion and neck supple  Skin:     General: Skin is warm and dry  Capillary Refill: Capillary refill takes less than 2 seconds  Neurological:      General: No focal deficit present  Mental Status: She is alert and oriented to person, place, and time  Psychiatric:         Mood and Affect: Mood normal          Behavior: Behavior normal          Thought Content:  Thought content normal          Judgment: Judgment normal

## 2023-03-31 DIAGNOSIS — K90.9 MALABSORPTION SYNDROME: ICD-10-CM

## 2023-03-31 DIAGNOSIS — E66.01 MORBID OBESITY (HCC): ICD-10-CM

## 2023-03-31 DIAGNOSIS — Z98.84 HISTORY OF ROUX-EN-Y GASTRIC BYPASS: Primary | ICD-10-CM

## 2023-03-31 DIAGNOSIS — D50.9 IRON DEFICIENCY ANEMIA, UNSPECIFIED IRON DEFICIENCY ANEMIA TYPE: ICD-10-CM

## 2023-03-31 RX ORDER — SODIUM CHLORIDE 9 MG/ML
20 INJECTION, SOLUTION INTRAVENOUS ONCE
Status: CANCELLED | OUTPATIENT
Start: 2023-04-04

## 2023-04-04 ENCOUNTER — HOSPITAL ENCOUNTER (OUTPATIENT)
Dept: INFUSION CENTER | Facility: HOSPITAL | Age: 68
Discharge: HOME | End: 2023-04-04
Attending: INTERNAL MEDICINE

## 2023-04-04 VITALS
RESPIRATION RATE: 18 BRPM | TEMPERATURE: 97.5 F | HEART RATE: 69 BPM | DIASTOLIC BLOOD PRESSURE: 69 MMHG | SYSTOLIC BLOOD PRESSURE: 128 MMHG

## 2023-04-04 DIAGNOSIS — Z98.84 HISTORY OF ROUX-EN-Y GASTRIC BYPASS: ICD-10-CM

## 2023-04-04 DIAGNOSIS — D50.9 IRON DEFICIENCY ANEMIA, UNSPECIFIED IRON DEFICIENCY ANEMIA TYPE: ICD-10-CM

## 2023-04-04 DIAGNOSIS — E66.01 MORBID OBESITY (HCC): Primary | ICD-10-CM

## 2023-04-04 DIAGNOSIS — K90.9 MALABSORPTION SYNDROME: ICD-10-CM

## 2023-04-04 RX ORDER — SODIUM CHLORIDE 9 MG/ML
20 INJECTION, SOLUTION INTRAVENOUS ONCE
Status: COMPLETED | OUTPATIENT
Start: 2023-04-04 | End: 2023-04-04

## 2023-04-04 RX ORDER — SODIUM CHLORIDE 9 MG/ML
20 INJECTION, SOLUTION INTRAVENOUS ONCE
OUTPATIENT
Start: 2023-05-30

## 2023-04-04 RX ADMIN — SODIUM CHLORIDE 20 ML/HR: 0.9 INJECTION, SOLUTION INTRAVENOUS at 08:26

## 2023-04-04 RX ADMIN — FERUMOXYTOL 510 MG: 510 INJECTION INTRAVENOUS at 08:27

## 2023-04-04 NOTE — PROGRESS NOTES
Patient arrived to unit without complaint  Patient tolerated Fereheme infusion without incident  AVS declined and patient aware of next appointment  Patient left in stable condition

## 2023-05-24 ENCOUNTER — APPOINTMENT (OUTPATIENT)
Dept: LAB | Facility: HOSPITAL | Age: 68
End: 2023-05-24

## 2023-05-24 DIAGNOSIS — E66.01 MORBID OBESITY (HCC): ICD-10-CM

## 2023-05-24 DIAGNOSIS — K90.9 MALABSORPTION SYNDROME: ICD-10-CM

## 2023-05-24 DIAGNOSIS — D50.9 IRON DEFICIENCY ANEMIA, UNSPECIFIED IRON DEFICIENCY ANEMIA TYPE: ICD-10-CM

## 2023-05-24 DIAGNOSIS — Z98.84 HISTORY OF ROUX-EN-Y GASTRIC BYPASS: ICD-10-CM

## 2023-05-24 LAB
ALBUMIN SERPL BCP-MCNC: 4 G/DL (ref 3.5–5)
ALP SERPL-CCNC: 72 U/L (ref 34–104)
ALT SERPL W P-5'-P-CCNC: 18 U/L (ref 7–52)
ANION GAP SERPL CALCULATED.3IONS-SCNC: 7 MMOL/L (ref 4–13)
AST SERPL W P-5'-P-CCNC: 17 U/L (ref 13–39)
BILIRUB SERPL-MCNC: 0.93 MG/DL (ref 0.2–1)
BUN SERPL-MCNC: 19 MG/DL (ref 5–25)
CALCIUM SERPL-MCNC: 9.6 MG/DL (ref 8.4–10.2)
CHLORIDE SERPL-SCNC: 103 MMOL/L (ref 96–108)
CO2 SERPL-SCNC: 30 MMOL/L (ref 21–32)
CREAT SERPL-MCNC: 0.92 MG/DL (ref 0.6–1.3)
ERYTHROCYTE [DISTWIDTH] IN BLOOD BY AUTOMATED COUNT: 14.5 % (ref 11.6–15.1)
FERRITIN SERPL-MCNC: 13 NG/ML (ref 11–307)
GFR SERPL CREATININE-BSD FRML MDRD: 65 ML/MIN/1.73SQ M
GLUCOSE P FAST SERPL-MCNC: 120 MG/DL (ref 65–99)
HCT VFR BLD AUTO: 40.2 % (ref 34.8–46.1)
HGB BLD-MCNC: 13 G/DL (ref 11.5–15.4)
IRON SATN MFR SERPL: 28 % (ref 15–50)
IRON SERPL-MCNC: 88 UG/DL (ref 50–170)
MCH RBC QN AUTO: 27.3 PG (ref 26.8–34.3)
MCHC RBC AUTO-ENTMCNC: 32.3 G/DL (ref 31.4–37.4)
MCV RBC AUTO: 85 FL (ref 82–98)
PLATELET # BLD AUTO: 288 THOUSANDS/UL (ref 149–390)
PMV BLD AUTO: 8.9 FL (ref 8.9–12.7)
POTASSIUM SERPL-SCNC: 4 MMOL/L (ref 3.5–5.3)
PROT SERPL-MCNC: 7.1 G/DL (ref 6.4–8.4)
RBC # BLD AUTO: 4.76 MILLION/UL (ref 3.81–5.12)
SODIUM SERPL-SCNC: 140 MMOL/L (ref 135–147)
TIBC SERPL-MCNC: 313 UG/DL (ref 250–450)
WBC # BLD AUTO: 7.34 THOUSAND/UL (ref 4.31–10.16)

## 2023-06-01 ENCOUNTER — HOSPITAL ENCOUNTER (OUTPATIENT)
Dept: INFUSION CENTER | Facility: HOSPITAL | Age: 68
Discharge: HOME | End: 2023-06-01
Attending: INTERNAL MEDICINE
Payer: COMMERCIAL

## 2023-06-01 VITALS
HEART RATE: 53 BPM | RESPIRATION RATE: 17 BRPM | TEMPERATURE: 97.4 F | SYSTOLIC BLOOD PRESSURE: 121 MMHG | OXYGEN SATURATION: 98 % | DIASTOLIC BLOOD PRESSURE: 65 MMHG

## 2023-06-01 DIAGNOSIS — Z98.84 HISTORY OF ROUX-EN-Y GASTRIC BYPASS: ICD-10-CM

## 2023-06-01 DIAGNOSIS — K90.9 MALABSORPTION SYNDROME: ICD-10-CM

## 2023-06-01 DIAGNOSIS — K90.9 MALABSORPTION SYNDROME: Primary | ICD-10-CM

## 2023-06-01 DIAGNOSIS — D50.9 IRON DEFICIENCY ANEMIA, UNSPECIFIED IRON DEFICIENCY ANEMIA TYPE: ICD-10-CM

## 2023-06-01 DIAGNOSIS — E66.01 MORBID OBESITY (HCC): ICD-10-CM

## 2023-06-01 DIAGNOSIS — E66.01 MORBID OBESITY (HCC): Primary | ICD-10-CM

## 2023-06-01 PROCEDURE — 96365 THER/PROPH/DIAG IV INF INIT: CPT

## 2023-06-01 RX ORDER — SODIUM CHLORIDE 9 MG/ML
20 INJECTION, SOLUTION INTRAVENOUS ONCE
OUTPATIENT
Start: 2023-08-24

## 2023-06-01 RX ORDER — SODIUM CHLORIDE 9 MG/ML
20 INJECTION, SOLUTION INTRAVENOUS ONCE
Status: COMPLETED | OUTPATIENT
Start: 2023-06-01 | End: 2023-06-01

## 2023-06-01 RX ORDER — SODIUM CHLORIDE 9 MG/ML
20 INJECTION, SOLUTION INTRAVENOUS ONCE
Status: CANCELLED | OUTPATIENT
Start: 2023-06-01

## 2023-06-01 RX ADMIN — FERUMOXYTOL 510 MG: 510 INJECTION INTRAVENOUS at 10:03

## 2023-06-01 RX ADMIN — SODIUM CHLORIDE 20 ML/HR: 0.9 INJECTION, SOLUTION INTRAVENOUS at 09:35

## 2023-06-01 NOTE — PROGRESS NOTES
Pt arrived on unit at 0900  Stated she thought her labs were good and felt like she didn't need the iron  I reached out to [facility] regarding pt's wishes  Stated pt could delay infusion for a week or up to 1 month  Pt stated that she would rather just get her infusion today while she's here rather than come back within the month  Tolerated infusion well  Discharged in satisfactory condition

## 2023-07-06 ENCOUNTER — APPOINTMENT (OUTPATIENT)
Dept: LAB | Facility: HOSPITAL | Age: 68
End: 2023-07-06
Payer: COMMERCIAL

## 2023-07-06 DIAGNOSIS — E66.01 MORBID OBESITY (HCC): ICD-10-CM

## 2023-07-06 DIAGNOSIS — Z98.84 HISTORY OF ROUX-EN-Y GASTRIC BYPASS: ICD-10-CM

## 2023-07-06 DIAGNOSIS — E11.69 DIABETES MELLITUS TYPE 2 IN OBESE (HCC): ICD-10-CM

## 2023-07-06 DIAGNOSIS — E66.9 DIABETES MELLITUS TYPE 2 IN OBESE (HCC): ICD-10-CM

## 2023-07-06 DIAGNOSIS — K90.9 MALABSORPTION SYNDROME: ICD-10-CM

## 2023-07-06 DIAGNOSIS — D50.9 IRON DEFICIENCY ANEMIA, UNSPECIFIED IRON DEFICIENCY ANEMIA TYPE: ICD-10-CM

## 2023-07-06 LAB
CHOLEST SERPL-MCNC: 185 MG/DL
CREAT UR-MCNC: 44.6 MG/DL
HDLC SERPL-MCNC: 55 MG/DL
LDLC SERPL CALC-MCNC: 97 MG/DL (ref 0–100)
MICROALBUMIN UR-MCNC: 5.9 MG/L (ref 0–20)
MICROALBUMIN/CREAT 24H UR: 13 MG/G CREATININE (ref 0–30)
TRIGL SERPL-MCNC: 164 MG/DL
TSH SERPL DL<=0.05 MIU/L-ACNC: 0.79 UIU/ML (ref 0.45–4.5)

## 2023-07-06 PROCEDURE — 82570 ASSAY OF URINE CREATININE: CPT

## 2023-07-06 PROCEDURE — 83036 HEMOGLOBIN GLYCOSYLATED A1C: CPT

## 2023-07-06 PROCEDURE — 84443 ASSAY THYROID STIM HORMONE: CPT

## 2023-07-06 PROCEDURE — 80061 LIPID PANEL: CPT

## 2023-07-06 PROCEDURE — 36415 COLL VENOUS BLD VENIPUNCTURE: CPT

## 2023-07-06 PROCEDURE — 85027 COMPLETE CBC AUTOMATED: CPT

## 2023-07-06 PROCEDURE — 82043 UR ALBUMIN QUANTITATIVE: CPT

## 2023-07-07 LAB
ERYTHROCYTE [DISTWIDTH] IN BLOOD BY AUTOMATED COUNT: 14.2 % (ref 11.6–15.1)
HCT VFR BLD AUTO: 43.1 % (ref 34.8–46.1)
HGB BLD-MCNC: 13.5 G/DL (ref 11.5–15.4)
MCH RBC QN AUTO: 27 PG (ref 26.8–34.3)
MCHC RBC AUTO-ENTMCNC: 31.3 G/DL (ref 31.4–37.4)
MCV RBC AUTO: 86 FL (ref 82–98)
PLATELET # BLD AUTO: 353 THOUSANDS/UL (ref 149–390)
PMV BLD AUTO: 9.5 FL (ref 8.9–12.7)
RBC # BLD AUTO: 5 MILLION/UL (ref 3.81–5.12)
WBC # BLD AUTO: 9.57 THOUSAND/UL (ref 4.31–10.16)

## 2023-07-09 LAB
EST. AVERAGE GLUCOSE BLD GHB EST-MCNC: 131 MG/DL
HBA1C MFR BLD: 6.2 %

## 2023-07-14 ENCOUNTER — OFFICE VISIT (OUTPATIENT)
Dept: FAMILY MEDICINE CLINIC | Facility: CLINIC | Age: 68
End: 2023-07-14
Payer: COMMERCIAL

## 2023-07-14 VITALS
RESPIRATION RATE: 20 BRPM | HEART RATE: 84 BPM | TEMPERATURE: 97.5 F | WEIGHT: 213 LBS | SYSTOLIC BLOOD PRESSURE: 122 MMHG | HEIGHT: 64 IN | BODY MASS INDEX: 36.37 KG/M2 | DIASTOLIC BLOOD PRESSURE: 64 MMHG

## 2023-07-14 DIAGNOSIS — E66.9 DIABETES MELLITUS TYPE 2 IN OBESE (HCC): Primary | ICD-10-CM

## 2023-07-14 DIAGNOSIS — M94.0 COSTOCHONDRITIS: ICD-10-CM

## 2023-07-14 DIAGNOSIS — E11.69 DIABETES MELLITUS TYPE 2 IN OBESE (HCC): Primary | ICD-10-CM

## 2023-07-14 DIAGNOSIS — I10 ESSENTIAL HYPERTENSION: ICD-10-CM

## 2023-07-14 DIAGNOSIS — E03.9 ACQUIRED HYPOTHYROIDISM: ICD-10-CM

## 2023-07-14 DIAGNOSIS — E78.5 DYSLIPIDEMIA: ICD-10-CM

## 2023-07-14 PROCEDURE — 99214 OFFICE O/P EST MOD 30 MIN: CPT | Performed by: FAMILY MEDICINE

## 2023-07-14 RX ORDER — LEVOTHYROXINE SODIUM 0.12 MG/1
125 TABLET ORAL DAILY
Qty: 90 TABLET | Refills: 1 | Status: SHIPPED | OUTPATIENT
Start: 2023-07-14

## 2023-07-14 RX ORDER — FLUTICASONE PROPIONATE 50 MCG
1 SPRAY, SUSPENSION (ML) NASAL DAILY
Qty: 3 G | Refills: 3 | Status: SHIPPED | OUTPATIENT
Start: 2023-07-14

## 2023-07-14 RX ORDER — DICLOFENAC SODIUM 75 MG/1
75 TABLET, DELAYED RELEASE ORAL 2 TIMES DAILY
Qty: 60 TABLET | Refills: 0 | Status: CANCELLED | OUTPATIENT
Start: 2023-07-14

## 2023-07-14 RX ORDER — EZETIMIBE 10 MG/1
10 TABLET ORAL DAILY
Qty: 90 TABLET | Refills: 1 | Status: SHIPPED | OUTPATIENT
Start: 2023-07-14

## 2023-07-14 RX ORDER — METFORMIN HYDROCHLORIDE 500 MG/1
500 TABLET, EXTENDED RELEASE ORAL
Qty: 360 TABLET | Refills: 1 | Status: SHIPPED | OUTPATIENT
Start: 2023-07-14

## 2023-07-14 RX ORDER — METHYLPREDNISOLONE 4 MG/1
TABLET ORAL
Qty: 21 EACH | Refills: 0 | Status: SHIPPED | OUTPATIENT
Start: 2023-07-14

## 2023-07-14 RX ORDER — AMLODIPINE BESYLATE 5 MG/1
5 TABLET ORAL DAILY
Qty: 90 TABLET | Refills: 1 | Status: SHIPPED | OUTPATIENT
Start: 2023-07-14

## 2023-07-14 NOTE — PROGRESS NOTES
Assessment/Plan: Diabetes mellitus type 2 with a blood sugar well controlled and hemoglobin A1c of 6.2    Hyperlipidemia with a total cholesterol of 185 triglycerides of 164 HDL of 55 and LDL of 97    Hypertensive cardiovascular disease with a blood pressure well controlled on the current regimen    Chronic kidney disease stage II mild    Status post gastric bypass with a current BMI of 36.56 down from 44 prior to Hima-en-Y gastric bypass    Acquired hypothyroidism euthyroid on current regimen    Tinnitus we will prescribe Flonase for eustachian tube dysfunction    Right-sided costochondritis we will prescribe a Medrol Dosepak the patient is to keep a close watch on her glucose and discontinue Medrol if her 50 blood sugar goes above 250    Problem List Items Addressed This Visit        Endocrine    Diabetes mellitus type 2 in obese (HCC) - Primary    Relevant Medications    metFORMIN (GLUCOPHAGE-XR) 500 mg 24 hr tablet    sitaGLIPtin (Januvia) 50 mg tablet    fluticasone (FLONASE) 50 mcg/act nasal spray    Other Relevant Orders    Albumin / creatinine urine ratio    Comprehensive metabolic panel    Hemoglobin A1C    Ambulatory referral to clinical pharmacy    Hypothyroidism    Relevant Medications    levothyroxine 125 mcg tablet    methylPREDNISolone 4 MG tablet therapy pack   Other Visit Diagnoses     Essential hypertension        Relevant Medications    amLODIPine (NORVASC) 5 mg tablet    Dyslipidemia        Relevant Medications    ezetimibe (ZETIA) 10 mg tablet    Costochondritis        Relevant Medications    methylPREDNISolone 4 MG tablet therapy pack           Diagnoses and all orders for this visit:    Diabetes mellitus type 2 in obese (HCC)  -     metFORMIN (GLUCOPHAGE-XR) 500 mg 24 hr tablet; Take 1 tablet (500 mg total) by mouth daily with breakfast  -     sitaGLIPtin (Januvia) 50 mg tablet; Take 1 tablet (50 mg total) by mouth daily  -     Albumin / creatinine urine ratio;  Future  -     Comprehensive metabolic panel; Future  -     Hemoglobin A1C; Future  -     fluticasone (FLONASE) 50 mcg/act nasal spray; 1 spray into each nostril daily  -     Ambulatory referral to clinical pharmacy; Future    Essential hypertension  -     amLODIPine (NORVASC) 5 mg tablet; Take 1 tablet (5 mg total) by mouth daily    Dyslipidemia  -     ezetimibe (ZETIA) 10 mg tablet; Take 1 tablet (10 mg total) by mouth daily    Acquired hypothyroidism  -     levothyroxine 125 mcg tablet; Take 1 tablet (125 mcg total) by mouth daily    Costochondritis  -     methylPREDNISolone 4 MG tablet therapy pack; Use as directed on package        No problem-specific Assessment & Plan notes found for this encounter. PHQ-2/9 Depression Screening            Body mass index is 36.56 kg/m². BMI Counseling: Body mass index is 36.56 kg/m². The BMI     Subjective:      Patient ID: Louis Mckeon is a 74 y.o. female. Patient presents for 4-month checkup for diabetes mellitus type 2 and hypertension      The following portions of the patient's history were reviewed and updated as appropriate:   She has a past medical history of Anemia, Anesthesia complication, Diabetes mellitus ([address]), Disease of thyroid gland, Hyperlipidemia, Hypertension, Hypothyroidism, Morbid obesity ([address]), Pancreatitis (09/1997), Sleep apnea, Type 2 diabetes mellitus ([address]), and Vitamin D deficiency. ,  does not have any pertinent problems on file. ,   has a past surgical history that includes Gastric bypass (05/2013); Cholecystectomy (04/2013); Total knee arthroplasty (Left, 2013); Breast excisional biopsy (Left, 07/2001); Colonoscopy; EGD; Tonsillectomy; Joint replacement; and pr excision skin abd infraumbilical panniculectomy (N/A, 2/15/2023). ,  family history includes Brain cancer in her mother; Breast cancer in her maternal aunt, maternal aunt, and mother; Cancer in her paternal aunt, paternal aunt, paternal aunt, paternal grandfather, and paternal grandmother; Heart attack in her father; Lung cancer in her mother and paternal aunt; No Known Problems in her daughter, maternal grandfather, maternal grandmother, sister, sister, sister, and sister; Stroke in her sister. ,   reports that she has never smoked. She has never used smokeless tobacco. She reports that she does not currently use alcohol. She reports that she does not use drugs. ,  is allergic to aspirin, codeine, nsaids, statins, and wound dressing adhesive. .  Current Outpatient Medications   Medication Sig Dispense Refill   • amLODIPine (NORVASC) 5 mg tablet Take 1 tablet (5 mg total) by mouth daily 90 tablet 1   • ezetimibe (ZETIA) 10 mg tablet Take 1 tablet (10 mg total) by mouth daily 90 tablet 1   • fluticasone (FLONASE) 50 mcg/act nasal spray 1 spray into each nostril daily 3 g 3   • levothyroxine 125 mcg tablet Take 1 tablet (125 mcg total) by mouth daily 90 tablet 1   • metFORMIN (GLUCOPHAGE-XR) 500 mg 24 hr tablet Take 1 tablet (500 mg total) by mouth daily with breakfast 360 tablet 1   • methylPREDNISolone 4 MG tablet therapy pack Use as directed on package 21 each 0   • sitaGLIPtin (Januvia) 50 mg tablet Take 1 tablet (50 mg total) by mouth daily 90 tablet 1   • b complex vitamins capsule Take 1 capsule by mouth daily     • Blood Glucose Monitoring Suppl (PeopleAdminace Blood Glucose Monitor) CARLOS Use in the morning 1 each 0   • carvedilol (COREG) 6.25 mg tablet TAKE 1 TABLET BY MOUTH TWICE DAILY 180 tablet 1   • ergocalciferol (VITAMIN D2) 50,000 units Take 1 capsule (50,000 Units total) by mouth once a week 12 capsule 2   • glucose blood test strip Use 1 each daily Use as instructed 100 strip 11   • hydrochlorothiazide (HYDRODIURIL) 25 mg tablet Take 1 tablet (25 mg total) by mouth daily 90 tablet 1   • Lancets MISC Use daily 100 each 11   • Multiple Vitamin (multivitamin) tablet Take 1 tablet by mouth daily       No current facility-administered medications for this visit.        Review of Systems   Constitutional: Negative for chills and fever. HENT: Negative for ear pain and sore throat. Eyes: Negative for pain and visual disturbance. Respiratory: Negative for cough and shortness of breath. Cardiovascular: Negative for chest pain and palpitations. Gastrointestinal: Negative for abdominal pain and vomiting. Genitourinary: Negative for dysuria and hematuria. Musculoskeletal: Negative for arthralgias and back pain. Right shoulder pain and right costochondral pain   Skin: Negative for color change and rash. Neurological: Negative for seizures and syncope. All other systems reviewed and are negative. Objective:    /64   Pulse 84   Temp 97.5 °F (36.4 °C)   Resp 20   Ht 5' 4" (1.626 m)   Wt 96.6 kg (213 lb)   BMI 36.56 kg/m²   Body mass index is 36.56 kg/m². Physical Exam  Constitutional:       Appearance: Normal appearance. She is well-developed. She is obese. HENT:      Head: Normocephalic and atraumatic. Right Ear: Ear canal and external ear normal. Tympanic membrane is bulging. Left Ear: Ear canal and external ear normal. Tympanic membrane is bulging. Nose: Nose normal.      Mouth/Throat:      Mouth: Mucous membranes are moist.      Pharynx: Oropharynx is clear. Eyes:      Extraocular Movements: Extraocular movements intact. Conjunctiva/sclera: Conjunctivae normal.      Pupils: Pupils are equal, round, and reactive to light. Cardiovascular:      Rate and Rhythm: Normal rate and regular rhythm. Pulses: Normal pulses. Heart sounds: Normal heart sounds. Pulmonary:      Effort: Pulmonary effort is normal.      Breath sounds: Normal breath sounds. Abdominal:      General: Abdomen is flat. Bowel sounds are normal.      Palpations: Abdomen is soft. Tenderness: There is no abdominal tenderness. Musculoskeletal:         General: Normal range of motion. Cervical back: Normal range of motion and neck supple.       Comments: Pain on palpation of the costochondral junctions of the right chest   Skin:     General: Skin is warm and dry. Capillary Refill: Capillary refill takes less than 2 seconds. Neurological:      General: No focal deficit present. Mental Status: She is alert and oriented to person, place, and time. Psychiatric:         Mood and Affect: Mood normal.         Behavior: Behavior normal.         Thought Content:  Thought content normal.         Judgment: Judgment normal.

## 2023-07-18 ENCOUNTER — TELEPHONE (OUTPATIENT)
Dept: FAMILY MEDICINE CLINIC | Facility: CLINIC | Age: 68
End: 2023-07-18

## 2023-07-18 DIAGNOSIS — E66.9 DIABETES MELLITUS TYPE 2 IN OBESE (HCC): Primary | ICD-10-CM

## 2023-07-18 DIAGNOSIS — E11.69 DIABETES MELLITUS TYPE 2 IN OBESE (HCC): Primary | ICD-10-CM

## 2023-07-18 NOTE — TELEPHONE ENCOUNTER
[address] Eunice Yates, Pharmacist        Reason for documentation: Patient falls into the statin use in persons with diabetes (SUPD) measure and their chart reports a medication allergy related to previous statin use.      Recommendation: Please consider submitting an appropriate ICD-10 code via a medical claim during an encounter in the measurement year to remove your patient from the measure. Included below are common ICD-10 codes accepted for exclusion.  Supporting documentation in the patient's chart must be clear as well.     Exclusion: Prediabetes  R73.03 - Prediabetes  R73.09 - Other abnormal blood glucose       Exclusion: Polycystic Ovarian Syndrome (PCOS)  E28.2 - Polycystic ovarian syndrome       Exclusion: Rhabdomyolysis/Myopathy  G72.0 - Drug-induced myopathy  M60.9 - Myositis, unspecified    G72.89 - Other specified myopathies  M62.82 - Rhabdomyolysis    G72.9 - Myopathy, unspecified  T46.6X5A - Adverse effect of antihyperlipidemic and antiarteriosclerotic drugs, initial encounter    M60.80 - Other myositis, unspecified site       Exclusion: Liver Disease  B18.2 - Chronic viral hepatitis C  K72.90 - Hepatic failure, unspecified without coma    B19.20 - Unspecified viral hepatitis C without hepatic coma  K74.0 - Hepatic fibrosis    Q42.72 - Alcoholic cirrhosis of liver without ascites  K74.3 - Primary biliary cirrhosis    C21.72 - Alcoholic cirrhosis of liver with ascites  K74.60 - Unspecified cirrhosis of liver    K72.00 - Acute and subacute hepatic failure without coma  K74.69 - Other cirrhosis of liver      Findings:      Past statin use: unable to determine from chart review     Allergies   Allergen Reactions   • Aspirin Angioedema   • Codeine Angioedema   • Nsaids Angioedema   • Statins Other (See Comments) and Angioedema     Patient states allergic to statins   • Wound Dressing Adhesive Rash     Pharmacist Tracking Tool  Reason For Outreach: Population Health Pharmacist  Demographics:  Intervention Method: Chart Review  Type of Intervention: New  Topics Addressed: Diabetes  Pharmacologic Interventions: Prevent or Manage OMARI  Non-Pharmacologic Interventions: Care Gap  Time:  Direct Patient Care: 0 mins  Care Coordination: 20 mins  Recommendation Recipient: Provider  Outcome: Pending/ Follow-up Required

## 2023-08-17 ENCOUNTER — APPOINTMENT (OUTPATIENT)
Dept: LAB | Facility: HOSPITAL | Age: 68
End: 2023-08-17
Payer: COMMERCIAL

## 2023-08-17 DIAGNOSIS — K90.9 MALABSORPTION SYNDROME: ICD-10-CM

## 2023-08-17 DIAGNOSIS — Z98.84 HISTORY OF ROUX-EN-Y GASTRIC BYPASS: ICD-10-CM

## 2023-08-17 DIAGNOSIS — E66.01 MORBID OBESITY (HCC): ICD-10-CM

## 2023-08-17 DIAGNOSIS — D50.9 IRON DEFICIENCY ANEMIA, UNSPECIFIED IRON DEFICIENCY ANEMIA TYPE: ICD-10-CM

## 2023-08-17 LAB
ALBUMIN SERPL BCP-MCNC: 4 G/DL (ref 3.5–5)
ALP SERPL-CCNC: 73 U/L (ref 34–104)
ALT SERPL W P-5'-P-CCNC: 16 U/L (ref 7–52)
ANION GAP SERPL CALCULATED.3IONS-SCNC: 8 MMOL/L
AST SERPL W P-5'-P-CCNC: 14 U/L (ref 13–39)
BILIRUB SERPL-MCNC: 1.19 MG/DL (ref 0.2–1)
BUN SERPL-MCNC: 16 MG/DL (ref 5–25)
CALCIUM SERPL-MCNC: 9.6 MG/DL (ref 8.4–10.2)
CHLORIDE SERPL-SCNC: 102 MMOL/L (ref 96–108)
CO2 SERPL-SCNC: 30 MMOL/L (ref 21–32)
CREAT SERPL-MCNC: 0.85 MG/DL (ref 0.6–1.3)
FERRITIN SERPL-MCNC: 61 NG/ML (ref 11–307)
GFR SERPL CREATININE-BSD FRML MDRD: 72 ML/MIN/1.73SQ M
GLUCOSE P FAST SERPL-MCNC: 128 MG/DL (ref 65–99)
IRON SATN MFR SERPL: 31 % (ref 15–50)
IRON SERPL-MCNC: 88 UG/DL (ref 50–170)
POTASSIUM SERPL-SCNC: 3.9 MMOL/L (ref 3.5–5.3)
PROT SERPL-MCNC: 7.3 G/DL (ref 6.4–8.4)
SODIUM SERPL-SCNC: 140 MMOL/L (ref 135–147)
TIBC SERPL-MCNC: 285 UG/DL (ref 250–450)

## 2023-08-17 PROCEDURE — 82728 ASSAY OF FERRITIN: CPT

## 2023-08-17 PROCEDURE — 83550 IRON BINDING TEST: CPT

## 2023-08-17 PROCEDURE — 80053 COMPREHEN METABOLIC PANEL: CPT

## 2023-08-17 PROCEDURE — 83540 ASSAY OF IRON: CPT

## 2023-08-17 PROCEDURE — 36415 COLL VENOUS BLD VENIPUNCTURE: CPT

## 2023-08-24 ENCOUNTER — HOSPITAL ENCOUNTER (OUTPATIENT)
Dept: INFUSION CENTER | Facility: HOSPITAL | Age: 68
Discharge: HOME | End: 2023-08-24
Attending: INTERNAL MEDICINE
Payer: COMMERCIAL

## 2023-08-24 VITALS
SYSTOLIC BLOOD PRESSURE: 137 MMHG | RESPIRATION RATE: 18 BRPM | HEART RATE: 66 BPM | OXYGEN SATURATION: 98 % | DIASTOLIC BLOOD PRESSURE: 66 MMHG | TEMPERATURE: 97.4 F

## 2023-08-24 DIAGNOSIS — E66.01 MORBID OBESITY (HCC): Primary | ICD-10-CM

## 2023-08-24 DIAGNOSIS — K90.9 MALABSORPTION SYNDROME: ICD-10-CM

## 2023-08-24 DIAGNOSIS — Z98.84 HISTORY OF ROUX-EN-Y GASTRIC BYPASS: ICD-10-CM

## 2023-08-24 DIAGNOSIS — D50.9 IRON DEFICIENCY ANEMIA, UNSPECIFIED IRON DEFICIENCY ANEMIA TYPE: ICD-10-CM

## 2023-08-24 PROCEDURE — 96365 THER/PROPH/DIAG IV INF INIT: CPT

## 2023-08-24 RX ORDER — SODIUM CHLORIDE 9 MG/ML
20 INJECTION, SOLUTION INTRAVENOUS ONCE
OUTPATIENT
Start: 2023-11-16

## 2023-08-24 RX ORDER — SODIUM CHLORIDE 9 MG/ML
20 INJECTION, SOLUTION INTRAVENOUS ONCE
Status: COMPLETED | OUTPATIENT
Start: 2023-08-24 | End: 2023-08-24

## 2023-08-24 RX ADMIN — SODIUM CHLORIDE 20 ML/HR: 0.9 INJECTION, SOLUTION INTRAVENOUS at 10:27

## 2023-08-24 RX ADMIN — FERUMOXYTOL 510 MG: 510 INJECTION INTRAVENOUS at 10:27

## 2023-08-24 NOTE — PROGRESS NOTES
Pharmacy Heart Failure Clinic Evaluation    Rosana Lucero, MRN: [id number]   89 year old Female  : 1931   Author: Pastor Koehler RPH     Primary Assessment  Assessment date: 2020   Person instructed: Patient and daughter  Primary Language: English  Method of Instruction: Explanation  Topic: Medication  Evaluation: Able to verbalize  Instructor: Pastor Koehler RPH     Reason for pharmacy consult:  Heart failure follow up  Counseling and compliance assessment for heart failure  No recent medication changes    Compliance and strategies:  Pharmacy for prescription fills: [facility]  Adherence method: Pillbox  Patient reported compliance: Yes  No financial barriers to medications identified  No compliance issues identified    Medication issues identified/recommendations:    - Medication review.  Patient doing well.  Patient is not having trouble splitting tablets since I spoke to her at last visit.  Gave patient a pill cutter incase she needs it.  Reiterated to patient to continue to take her medications.  Patient complaint of waking up at 2 AM on occasion.  This is not medication related.  No drug interactions on medication review.  All patient questions were answered.  - Education: Counseled patient on all heart failure medications including indications, dosing, timing, side effects, and importance of compliance.  - All recommendations discussed with heart failure team.  - Verified that AVS was accurate prior to patient leaving clinic.  - Total face-to-face time spent with patient: 10 minutes    Follow up:  Patient was instructed to call with further medication questions. Pharmacist will see on an as needed basis.  
Pt tolerated infusion well d/c to home with f/u appt
fluid retention
No antibiotics or any antibiotics < 2 hrs prior to birth

## 2023-09-04 DIAGNOSIS — E66.9 DIABETES MELLITUS TYPE 2 IN OBESE (HCC): ICD-10-CM

## 2023-09-04 DIAGNOSIS — E11.69 DIABETES MELLITUS TYPE 2 IN OBESE (HCC): ICD-10-CM

## 2023-09-04 RX ORDER — FLUTICASONE PROPIONATE 50 MCG
SPRAY, SUSPENSION (ML) NASAL
Qty: 48 ML | Refills: 4 | Status: SHIPPED | OUTPATIENT
Start: 2023-09-04

## 2023-09-08 ENCOUNTER — APPOINTMENT (OUTPATIENT)
Dept: LAB | Facility: HOSPITAL | Age: 68
End: 2023-09-08
Payer: COMMERCIAL

## 2023-09-08 DIAGNOSIS — Z01.812 PRE-OPERATIVE LABORATORY EXAMINATION: ICD-10-CM

## 2023-09-08 DIAGNOSIS — R23.3 SPONTANEOUS ECCHYMOSES: ICD-10-CM

## 2023-09-08 LAB
APTT PPP: 31 SECONDS (ref 23–37)
BASOPHILS # BLD AUTO: 0.02 THOUSANDS/ÂΜL (ref 0–0.1)
BASOPHILS NFR BLD AUTO: 0 % (ref 0–1)
EOSINOPHIL # BLD AUTO: 0.3 THOUSAND/ÂΜL (ref 0–0.61)
EOSINOPHIL NFR BLD AUTO: 4 % (ref 0–6)
ERYTHROCYTE [DISTWIDTH] IN BLOOD BY AUTOMATED COUNT: 13.2 % (ref 11.6–15.1)
HCT VFR BLD AUTO: 40.9 % (ref 34.8–46.1)
HGB BLD-MCNC: 13.4 G/DL (ref 11.5–15.4)
IMM GRANULOCYTES # BLD AUTO: 0.04 THOUSAND/UL (ref 0–0.2)
IMM GRANULOCYTES NFR BLD AUTO: 1 % (ref 0–2)
INR PPP: 1.04 (ref 0.84–1.19)
LYMPHOCYTES # BLD AUTO: 1.69 THOUSANDS/ÂΜL (ref 0.6–4.47)
LYMPHOCYTES NFR BLD AUTO: 21 % (ref 14–44)
MCH RBC QN AUTO: 28.3 PG (ref 26.8–34.3)
MCHC RBC AUTO-ENTMCNC: 32.8 G/DL (ref 31.4–37.4)
MCV RBC AUTO: 87 FL (ref 82–98)
MONOCYTES # BLD AUTO: 0.61 THOUSAND/ÂΜL (ref 0.17–1.22)
MONOCYTES NFR BLD AUTO: 8 % (ref 4–12)
NEUTROPHILS # BLD AUTO: 5.39 THOUSANDS/ÂΜL (ref 1.85–7.62)
NEUTS SEG NFR BLD AUTO: 66 % (ref 43–75)
NRBC BLD AUTO-RTO: 0 /100 WBCS
PLATELET # BLD AUTO: 306 THOUSANDS/UL (ref 149–390)
PMV BLD AUTO: 9.1 FL (ref 8.9–12.7)
PROTHROMBIN TIME: 13.5 SECONDS (ref 11.6–14.5)
RBC # BLD AUTO: 4.73 MILLION/UL (ref 3.81–5.12)
WBC # BLD AUTO: 8.05 THOUSAND/UL (ref 4.31–10.16)

## 2023-09-08 PROCEDURE — 36415 COLL VENOUS BLD VENIPUNCTURE: CPT

## 2023-09-08 PROCEDURE — 85610 PROTHROMBIN TIME: CPT

## 2023-09-08 PROCEDURE — 85730 THROMBOPLASTIN TIME PARTIAL: CPT

## 2023-09-08 PROCEDURE — 85025 COMPLETE CBC W/AUTO DIFF WBC: CPT

## 2023-09-19 NOTE — PRE-PROCEDURE INSTRUCTIONS
Pre-Surgery Instructions:   Medication Instructions   • amLODIPine (NORVASC) 5 mg tablet Take day of surgery. • b complex vitamins capsule Stop taking 7 days prior to surgery. • carvedilol (COREG) 6.25 mg tablet Take day of surgery. • ergocalciferol (VITAMIN D2) 50,000 units Stop taking 7 days prior to surgery. • ezetimibe (ZETIA) 10 mg tablet Take day of surgery. • fluticasone (FLONASE) 50 mcg/act nasal spray Take day of surgery. • hydrochlorothiazide (HYDRODIURIL) 25 mg tablet Hold day of surgery. • levothyroxine 125 mcg tablet Take day of surgery. • metFORMIN (GLUCOPHAGE-XR) 500 mg 24 hr tablet Hold day of surgery. • Multiple Vitamin (multivitamin) tablet Stop taking 7 days prior to surgery. • sitaGLIPtin (Januvia) 50 mg tablet Hold day of surgery. Pt verbalizes understanding of the following:    - Bathing instructions, has chg, neck down, no genitals  - No lotions, powders, sprays, deodorant, jewelry, body piercings, false lashes or make-up  - No shaving within 24hrs    - DO NOT EAT OR DRINK ANYTHING after midnight on the evening before your procedure including coffee, tea, gum or hard candy.    - ONLY SIPS OF WATER with your medications are allowed on the morning of your procedure.   - Avoid OTC non-directed Vit/ Suppl/ Herbals 7 days prior to surgery to ensure no drug interactions with perioperative surgical/ anesthetic meds  - Avoid NSAIDs 3 days prior  - Avoid ASA containing products 5 days prior  - Continue statin medication up through and including the day of surgery  - Bring a list of meds you take at home with your last dose noted    - Arrange for someone to drive you home after the procedure & stay with you until the next morning  - Bring insurance cards & photo id    - Bring a case for your glasses   - Leave all valuables such as credit cards, money & jewelry at home    - Notify surgeon if you develop any cold symptoms, change in your health history or develop a rash/ open wounds - Did the surgeon's office give you any other special instructions? No   - Did surgeon require any clearances?  No

## 2023-09-25 ENCOUNTER — ANESTHESIA EVENT (OUTPATIENT)
Dept: PERIOP | Facility: HOSPITAL | Age: 68
End: 2023-09-25
Payer: SELF-PAY

## 2023-09-25 NOTE — H&P
H&P Exam - Juan Manuel Santiago 74 y.o. female MRN: [id number]    Unit/Bed#:  Encounter: [id number]    Assessment:  Lipodystrophy of the thighs and arms    Plan:  Bilateral brachioplasty debulking of medial and lateral thighs    History of Present Illness   This is a 61-year-old female with severe lipodystrophy of the thighs. Patient also has redundant skin and fat of the upper arms. A brachioplasty will be performed and liposuction of the medial lateral thighs for debulking the area patient understands this is not cosmetic we are just debulking the excess fat    Review of Systems   All other systems reviewed and are negative.       Historical Information   Past Medical History:   Diagnosis Date   • Anemia    • Anesthesia complication     Facial swelling   • Diabetes mellitus ([address])    • Disease of thyroid gland    • Hyperlipidemia    • Hypertension    • Hypothyroidism    • Morbid obesity ([address])    • Pancreatitis 09/1997   • Sleep apnea     no cpap machine used   • Type 2 diabetes mellitus ([address])    • Vitamin D deficiency    • Wears glasses      Past Surgical History:   Procedure Laterality Date   • BREAST EXCISIONAL BIOPSY Left 07/2001    benign   • CHOLECYSTECTOMY  04/2013   • COLONOSCOPY     • EGD     • GASTRIC BYPASS  05/2013   • JOINT REPLACEMENT     • MN EXCISION SKIN ABD INFRAUMBILICAL PANNICULECTOMY N/A 2/15/2023    Procedure: Ruthie Frias;  Surgeon: Leonel Argueta MD;  Location: [address] [place] [address];  Service: Plastics   • TONSILLECTOMY     • TOTAL KNEE ARTHROPLASTY Left 2013     Social History   Social History     Substance and Sexual Activity   Alcohol Use Not Currently    Comment: social     Social History     Substance and Sexual Activity   Drug Use Never     Social History     Tobacco Use   Smoking Status Never   Smokeless Tobacco Never     E-Cigarette Use: Never User     E-Cigarette/Vaping Substances   • Nicotine No    • THC No    • CBD No    • Flavoring No    • Other No    • Unknown No        Family History: non-contributory    Meds/Allergies   all medications and allergies reviewed  Allergies   Allergen Reactions   • Aspirin Angioedema   • Codeine Angioedema   • Nsaids Angioedema   • Statins Other (See Comments) and Angioedema     Patient states allergic to statins   • Wound Dressing Adhesive Rash       Objective   First Vitals:   Height: 5' 4" (162.6 cm) (09/19/23 1100)  Weight - Scale: 96.6 kg (213 lb) (09/19/23 1100)    Current Vitals:   Height: 5' 4" (162.6 cm) (09/19/23 1100)  Weight - Scale: 96.6 kg (213 lb) (09/19/23 1100)    No intake or output data in the 24 hours ending 09/25/23 1754    Invasive Devices     Drain  Duration           Closed/Suction Drain Inferior; Left Back Bulb 19 Fr. 222 days    Closed/Suction Drain Inferior;Right Back Bulb 19 Fr. 222 days    Closed/Suction Drain RLQ Bulb 19 Fr. 222 days                Physical Exam lamination of the head ears eyes nose and throat is within normal limits heart sounds S1-S2 heard no murmurs or gallops lungs clear abdomen soft extremities within normal limits patient has excess skin and fat of the upper arms as well as large deposits of fat in the medial and lateral thigh areas    Lab Results:   Imaging:   EKG, Pathology, and Other Studies:     Code Status: Prior  Advance Directive and Living Will:      Power of :    POLST:      Counseling / Coordination of Care:   None

## 2023-09-25 NOTE — ANESTHESIA PREPROCEDURE EVALUATION
Procedure:  BRACHIOPLASTY (Bilateral: Arm Upper)  LIPOSUCTION OF LATERAL & MEDIAL THIGHS (Bilateral: Thigh)    Relevant Problems   CARDIO   (+) HTN (hypertension)   (+) Hyperlipidemia      ENDO   (+) Diabetes mellitus type 2 in obese    (+) Hypothyroidism      HEMATOLOGY   (+) Iron deficiency anemia      PULMONARY   (+) Sleep apnea      Digestive   (+) Malabsorption syndrome      Other   (+) History of Hima-en-Y gastric bypass   (+) Morbid obesity (HCC)        Physical Exam    Airway    Mallampati score: II  TM Distance: >3 FB  Neck ROM: full     Dental       Cardiovascular  Cardiovascular exam normal    Pulmonary  Pulmonary exam normal     Other Findings        Anesthesia Plan  ASA Score- 2     Anesthesia Type- general with ASA Monitors. Additional Monitors:   Airway Plan: ETT. Plan Factors-Exercise tolerance (METS): >4 METS. Chart reviewed. Existing labs reviewed. Patient summary reviewed. Patient is not a current smoker. Patient not instructed to abstain from smoking on day of procedure. Patient did not smoke on day of surgery. Induction- intravenous. Postoperative Plan- Plan for postoperative opioid use. Informed Consent- Anesthetic plan and risks discussed with patient. I personally reviewed this patient with the CRNA. Discussed and agreed on the Anesthesia Plan with the CRNA. .        Lab Results   Component Value Date    HGBA1C 6.2 (H) 07/06/2023       Lab Results   Component Value Date     12/19/2015    K 3.9 08/17/2023     08/17/2023    CO2 30 08/17/2023    ANIONGAP 9 12/19/2015    BUN 16 08/17/2023    CREATININE 0.85 08/17/2023    GLUCOSE 135 11/09/2022    GLUF 128 (H) 08/17/2023    CALCIUM 9.6 08/17/2023    CORRECTEDCA 9.8 10/24/2022    AST 14 08/17/2023    ALT 16 08/17/2023    ALKPHOS 73 08/17/2023    PROT 7.1 12/19/2015    BILITOT 1.24 (H) 12/19/2015    EGFR 72 08/17/2023       Lab Results   Component Value Date    WBC 8.05 09/08/2023    HGB 13.4 09/08/2023    HCT 40.9 09/08/2023    MCV 87 09/08/2023     09/08/2023   ekg NSR

## 2023-09-26 ENCOUNTER — HOSPITAL ENCOUNTER (OUTPATIENT)
Facility: HOSPITAL | Age: 68
Setting detail: OUTPATIENT SURGERY
Discharge: HOME | End: 2023-09-26
Attending: PLASTIC SURGERY | Admitting: PLASTIC SURGERY
Payer: SELF-PAY

## 2023-09-26 ENCOUNTER — ANESTHESIA (OUTPATIENT)
Dept: PERIOP | Facility: HOSPITAL | Age: 68
End: 2023-09-26
Payer: SELF-PAY

## 2023-09-26 VITALS
OXYGEN SATURATION: 97 % | RESPIRATION RATE: 16 BRPM | DIASTOLIC BLOOD PRESSURE: 68 MMHG | WEIGHT: 213 LBS | SYSTOLIC BLOOD PRESSURE: 141 MMHG | BODY MASS INDEX: 36.37 KG/M2 | HEIGHT: 64 IN | HEART RATE: 43 BPM | TEMPERATURE: 96.4 F

## 2023-09-26 DIAGNOSIS — Z41.1 ENCOUNTER FOR COSMETIC SURGERY: Primary | ICD-10-CM

## 2023-09-26 LAB — GLUCOSE SERPL-MCNC: 118 MG/DL (ref 65–140)

## 2023-09-26 PROCEDURE — 82948 REAGENT STRIP/BLOOD GLUCOSE: CPT

## 2023-09-26 RX ORDER — SODIUM CHLORIDE, SODIUM LACTATE, POTASSIUM CHLORIDE, CALCIUM CHLORIDE 600; 310; 30; 20 MG/100ML; MG/100ML; MG/100ML; MG/100ML
INJECTION, SOLUTION INTRAVENOUS CONTINUOUS PRN
Status: DISCONTINUED | OUTPATIENT
Start: 2023-09-26 | End: 2023-09-26

## 2023-09-26 RX ORDER — CEFAZOLIN SODIUM 2 G/50ML
2000 SOLUTION INTRAVENOUS ONCE
Status: COMPLETED | OUTPATIENT
Start: 2023-09-26 | End: 2023-09-26

## 2023-09-26 RX ORDER — DIPHENHYDRAMINE HYDROCHLORIDE 50 MG/ML
12.5 INJECTION INTRAMUSCULAR; INTRAVENOUS ONCE AS NEEDED
Status: DISCONTINUED | OUTPATIENT
Start: 2023-09-26 | End: 2023-09-26 | Stop reason: HOSPADM

## 2023-09-26 RX ORDER — ONDANSETRON 2 MG/ML
4 INJECTION INTRAMUSCULAR; INTRAVENOUS ONCE AS NEEDED
Status: DISCONTINUED | OUTPATIENT
Start: 2023-09-26 | End: 2023-09-26 | Stop reason: HOSPADM

## 2023-09-26 RX ORDER — MAGNESIUM HYDROXIDE 1200 MG/15ML
LIQUID ORAL AS NEEDED
Status: DISCONTINUED | OUTPATIENT
Start: 2023-09-26 | End: 2023-09-26 | Stop reason: HOSPADM

## 2023-09-26 RX ORDER — GLYCOPYRROLATE 0.2 MG/ML
INJECTION INTRAMUSCULAR; INTRAVENOUS AS NEEDED
Status: DISCONTINUED | OUTPATIENT
Start: 2023-09-26 | End: 2023-09-26

## 2023-09-26 RX ORDER — OXYCODONE HYDROCHLORIDE AND ACETAMINOPHEN 5; 325 MG/1; MG/1
1 TABLET ORAL EVERY 4 HOURS PRN
Status: DISCONTINUED | OUTPATIENT
Start: 2023-09-26 | End: 2023-09-26 | Stop reason: HOSPADM

## 2023-09-26 RX ORDER — FENTANYL CITRATE 50 UG/ML
INJECTION, SOLUTION INTRAMUSCULAR; INTRAVENOUS AS NEEDED
Status: DISCONTINUED | OUTPATIENT
Start: 2023-09-26 | End: 2023-09-26

## 2023-09-26 RX ORDER — SODIUM CHLORIDE, SODIUM LACTATE, POTASSIUM CHLORIDE, CALCIUM CHLORIDE 600; 310; 30; 20 MG/100ML; MG/100ML; MG/100ML; MG/100ML
125 INJECTION, SOLUTION INTRAVENOUS CONTINUOUS
Status: DISCONTINUED | OUTPATIENT
Start: 2023-09-26 | End: 2023-09-26 | Stop reason: HOSPADM

## 2023-09-26 RX ORDER — HYDROMORPHONE HCL/PF 1 MG/ML
SYRINGE (ML) INJECTION AS NEEDED
Status: DISCONTINUED | OUTPATIENT
Start: 2023-09-26 | End: 2023-09-26

## 2023-09-26 RX ORDER — ROCURONIUM BROMIDE 10 MG/ML
INJECTION, SOLUTION INTRAVENOUS AS NEEDED
Status: DISCONTINUED | OUTPATIENT
Start: 2023-09-26 | End: 2023-09-26

## 2023-09-26 RX ORDER — ONDANSETRON 2 MG/ML
4 INJECTION INTRAMUSCULAR; INTRAVENOUS ONCE
Status: DISCONTINUED | OUTPATIENT
Start: 2023-09-26 | End: 2023-09-26 | Stop reason: HOSPADM

## 2023-09-26 RX ORDER — PROPOFOL 10 MG/ML
INJECTION, EMULSION INTRAVENOUS AS NEEDED
Status: DISCONTINUED | OUTPATIENT
Start: 2023-09-26 | End: 2023-09-26

## 2023-09-26 RX ORDER — LIDOCAINE HYDROCHLORIDE 10 MG/ML
INJECTION, SOLUTION EPIDURAL; INFILTRATION; INTRACAUDAL; PERINEURAL AS NEEDED
Status: DISCONTINUED | OUTPATIENT
Start: 2023-09-26 | End: 2023-09-26

## 2023-09-26 RX ORDER — FENTANYL CITRATE/PF 50 MCG/ML
50 SYRINGE (ML) INJECTION
Status: DISCONTINUED | OUTPATIENT
Start: 2023-09-26 | End: 2023-09-26 | Stop reason: HOSPADM

## 2023-09-26 RX ORDER — DEXAMETHASONE SODIUM PHOSPHATE 10 MG/ML
INJECTION, SOLUTION INTRAMUSCULAR; INTRAVENOUS AS NEEDED
Status: DISCONTINUED | OUTPATIENT
Start: 2023-09-26 | End: 2023-09-26

## 2023-09-26 RX ORDER — SODIUM CHLORIDE, SODIUM LACTATE, POTASSIUM CHLORIDE, CALCIUM CHLORIDE 600; 310; 30; 20 MG/100ML; MG/100ML; MG/100ML; MG/100ML
50 INJECTION, SOLUTION INTRAVENOUS CONTINUOUS
Status: DISCONTINUED | OUTPATIENT
Start: 2023-09-26 | End: 2023-09-26 | Stop reason: HOSPADM

## 2023-09-26 RX ORDER — MIDAZOLAM HYDROCHLORIDE 2 MG/2ML
INJECTION, SOLUTION INTRAMUSCULAR; INTRAVENOUS AS NEEDED
Status: DISCONTINUED | OUTPATIENT
Start: 2023-09-26 | End: 2023-09-26

## 2023-09-26 RX ORDER — OXYCODONE HYDROCHLORIDE AND ACETAMINOPHEN 5; 325 MG/1; MG/1
1 TABLET ORAL EVERY 4 HOURS PRN
Qty: 30 TABLET | Refills: 0 | Status: SHIPPED | OUTPATIENT
Start: 2023-09-26 | End: 2023-10-06

## 2023-09-26 RX ORDER — NEOSTIGMINE METHYLSULFATE 1 MG/ML
INJECTION INTRAVENOUS AS NEEDED
Status: DISCONTINUED | OUTPATIENT
Start: 2023-09-26 | End: 2023-09-26

## 2023-09-26 RX ORDER — ONDANSETRON 2 MG/ML
INJECTION INTRAMUSCULAR; INTRAVENOUS AS NEEDED
Status: DISCONTINUED | OUTPATIENT
Start: 2023-09-26 | End: 2023-09-26

## 2023-09-26 RX ORDER — LIDOCAINE HYDROCHLORIDE AND EPINEPHRINE 5; 5 MG/ML; UG/ML
INJECTION, SOLUTION INFILTRATION; PERINEURAL AS NEEDED
Status: DISCONTINUED | OUTPATIENT
Start: 2023-09-26 | End: 2023-09-26 | Stop reason: HOSPADM

## 2023-09-26 RX ORDER — CEPHALEXIN 500 MG/1
500 CAPSULE ORAL EVERY 8 HOURS SCHEDULED
Qty: 21 CAPSULE | Refills: 0 | Status: SHIPPED | OUTPATIENT
Start: 2023-09-26 | End: 2023-10-03

## 2023-09-26 RX ADMIN — FENTANYL CITRATE 50 MCG: 50 INJECTION, SOLUTION INTRAMUSCULAR; INTRAVENOUS at 13:32

## 2023-09-26 RX ADMIN — ROCURONIUM BROMIDE 50 MG: 10 INJECTION, SOLUTION INTRAVENOUS at 07:40

## 2023-09-26 RX ADMIN — MIDAZOLAM 2 MG: 1 INJECTION INTRAMUSCULAR; INTRAVENOUS at 07:30

## 2023-09-26 RX ADMIN — CEFAZOLIN SODIUM 2000 MG: 2 SOLUTION INTRAVENOUS at 07:35

## 2023-09-26 RX ADMIN — ROCURONIUM BROMIDE 10 MG: 10 INJECTION, SOLUTION INTRAVENOUS at 10:01

## 2023-09-26 RX ADMIN — FENTANYL CITRATE 50 MCG: 50 INJECTION, SOLUTION INTRAMUSCULAR; INTRAVENOUS at 08:10

## 2023-09-26 RX ADMIN — PROPOFOL 100 MCG/KG/MIN: 10 INJECTION, EMULSION INTRAVENOUS at 07:50

## 2023-09-26 RX ADMIN — CEFAZOLIN SODIUM 2000 MG: 2 SOLUTION INTRAVENOUS at 11:44

## 2023-09-26 RX ADMIN — PROPOFOL 200 MG: 10 INJECTION, EMULSION INTRAVENOUS at 07:40

## 2023-09-26 RX ADMIN — FENTANYL CITRATE 50 MCG: 50 INJECTION, SOLUTION INTRAMUSCULAR; INTRAVENOUS at 07:39

## 2023-09-26 RX ADMIN — SODIUM CHLORIDE, SODIUM LACTATE, POTASSIUM CHLORIDE, AND CALCIUM CHLORIDE 50 ML/HR: .6; .31; .03; .02 INJECTION, SOLUTION INTRAVENOUS at 06:39

## 2023-09-26 RX ADMIN — OXYCODONE HYDROCHLORIDE AND ACETAMINOPHEN 1 TABLET: 5; 325 TABLET ORAL at 15:52

## 2023-09-26 RX ADMIN — SODIUM CHLORIDE, SODIUM LACTATE, POTASSIUM CHLORIDE, AND CALCIUM CHLORIDE: .6; .31; .03; .02 INJECTION, SOLUTION INTRAVENOUS at 09:36

## 2023-09-26 RX ADMIN — SODIUM CHLORIDE, SODIUM LACTATE, POTASSIUM CHLORIDE, AND CALCIUM CHLORIDE: .6; .31; .03; .02 INJECTION, SOLUTION INTRAVENOUS at 11:01

## 2023-09-26 RX ADMIN — SODIUM CHLORIDE, SODIUM LACTATE, POTASSIUM CHLORIDE, AND CALCIUM CHLORIDE: .6; .31; .03; .02 INJECTION, SOLUTION INTRAVENOUS at 12:32

## 2023-09-26 RX ADMIN — DEXAMETHASONE SODIUM PHOSPHATE 10 MG: 10 INJECTION, SOLUTION INTRAMUSCULAR; INTRAVENOUS at 07:55

## 2023-09-26 RX ADMIN — LIDOCAINE HYDROCHLORIDE 50 MG: 10 INJECTION, SOLUTION EPIDURAL; INFILTRATION; INTRACAUDAL; PERINEURAL at 07:39

## 2023-09-26 RX ADMIN — FENTANYL CITRATE 50 MCG: 50 INJECTION, SOLUTION INTRAMUSCULAR; INTRAVENOUS at 13:41

## 2023-09-26 RX ADMIN — ROCURONIUM BROMIDE 10 MG: 10 INJECTION, SOLUTION INTRAVENOUS at 08:17

## 2023-09-26 RX ADMIN — SODIUM CHLORIDE, SODIUM LACTATE, POTASSIUM CHLORIDE, AND CALCIUM CHLORIDE: .6; .31; .03; .02 INJECTION, SOLUTION INTRAVENOUS at 10:19

## 2023-09-26 RX ADMIN — HYDROMORPHONE HYDROCHLORIDE 0.5 MG: 1 INJECTION, SOLUTION INTRAMUSCULAR; INTRAVENOUS; SUBCUTANEOUS at 10:24

## 2023-09-26 RX ADMIN — SODIUM CHLORIDE, SODIUM LACTATE, POTASSIUM CHLORIDE, AND CALCIUM CHLORIDE: .6; .31; .03; .02 INJECTION, SOLUTION INTRAVENOUS at 06:36

## 2023-09-26 RX ADMIN — SODIUM CHLORIDE, SODIUM LACTATE, POTASSIUM CHLORIDE, AND CALCIUM CHLORIDE: .6; .31; .03; .02 INJECTION, SOLUTION INTRAVENOUS at 08:37

## 2023-09-26 RX ADMIN — NEOSTIGMINE 4 MG: 1 INJECTION INTRAVENOUS at 12:52

## 2023-09-26 RX ADMIN — GLYCOPYRROLATE 0.4 MG: 0.4 INJECTION INTRAMUSCULAR; INTRAVENOUS at 12:52

## 2023-09-26 RX ADMIN — HYDROMORPHONE HYDROCHLORIDE 0.5 MG: 1 INJECTION, SOLUTION INTRAMUSCULAR; INTRAVENOUS; SUBCUTANEOUS at 11:53

## 2023-09-26 RX ADMIN — ONDANSETRON 4 MG: 2 INJECTION INTRAMUSCULAR; INTRAVENOUS at 12:26

## 2023-09-26 RX ADMIN — FENTANYL CITRATE 50 MCG: 50 INJECTION, SOLUTION INTRAMUSCULAR; INTRAVENOUS at 13:21

## 2023-09-26 NOTE — DISCHARGE INSTR - AVS FIRST PAGE
Leave dressings on for 48 hours. Then may shower and wash with soap and water.   Use basketball sleeves for the upper extremities and use compression garment for the lower extremities

## 2023-09-26 NOTE — INTERVAL H&P NOTE
H&P reviewed. After examining the patient I find no changes in the patients condition since the H&P had been written.     Vitals:    09/26/23 0623   BP: 123/64   Pulse: 61   Resp: 18   Temp: (!) 97.2 °F (36.2 °C)   SpO2: 97%

## 2023-09-26 NOTE — OP NOTE
OPERATIVE REPORT  PATIENT NAME: Davis Alexander    :  1955  MRN: [id number]  Pt Location:  OR ROOM 07    SURGERY DATE: 2023    Surgeon(s) and Role:     * Miryam Grace MD - Primary    Preop Diagnosis:  Encounter for cosmetic surgery [Z41.1]    Post-Op Diagnosis Codes:     * Encounter for cosmetic surgery [Z41.1]    Procedure(s):  Bilateral - BRACHIOPLASTY  Bilateral - LIPOSUCTION OF LATERAL & MEDIAL THIGHS    Specimen(s):  * No specimens in log *    Estimated Blood Loss:   Minimal    Drains:  Closed/Suction Drain Inferior; Left Back Bulb 19 Fr. (Active)   Number of days: 223       Closed/Suction Drain Inferior;Right Back Bulb 19 Fr. (Active)   Number of days: 223       Closed/Suction Drain RLQ Bulb 19 Fr. (Active)   Number of days: 223       Urethral Catheter Latex 16 Fr. (Active)   Number of days: 0       Anesthesia Type:   General    Operative Indications:  Encounter for cosmetic surgery [Z41.1]  Lipodystrophy of the arms and medial and lateral thigh areas    Operative Findings:  Normal    Complications:   None    Procedure and Technique:  Patient was marked in the holding area for liposuction of the medial lateral thighs and bilateral brachioplasty. First tumescent liposuction was performed on the medial and lateral thighs in the usual fashion. Patient was redraped and prepped side to side and then in the supine position. Incisions were closed with 5-0 nylon suture. Next but both arms were redraped and prepped. The marked area was injected with local anesthesia the posterior portion of the arm was treated with tumescent liposuction. The premarked area was excised and the fascia was approximated with 2-0 Vicryl suture the dermis with 2-0 Vicryl suture and skin with a running subcuticular 3-0 Prolene suture. Identical procedure was performed on both sides soft dressings and Ace wraps were applied to the arms and a compression garment to the medial and lateral thigh areas.    I was present for the entire procedure.     Patient Disposition:  PACU         SIGNATURE: Umer Dhaliwal MD  DATE: September 26, 2023  TIME: 12:50 PM

## 2023-10-02 ENCOUNTER — TELEPHONE (OUTPATIENT)
Dept: FAMILY MEDICINE CLINIC | Facility: CLINIC | Age: 68
End: 2023-10-02

## 2023-10-02 DIAGNOSIS — E66.9 DIABETES MELLITUS TYPE 2 IN OBESE: ICD-10-CM

## 2023-10-02 DIAGNOSIS — E11.69 DIABETES MELLITUS TYPE 2 IN OBESE: ICD-10-CM

## 2023-10-02 RX ORDER — METFORMIN HYDROCHLORIDE 500 MG/1
2000 TABLET, EXTENDED RELEASE ORAL
Qty: 360 TABLET | Refills: 1 | Status: SHIPPED | OUTPATIENT
Start: 2023-10-02

## 2023-10-02 NOTE — TELEPHONE ENCOUNTER
4 tablets of metformin a day, fix script, it only states one a day in which the patient didn't get enough pills

## 2023-10-04 DIAGNOSIS — I10 ESSENTIAL HYPERTENSION: ICD-10-CM

## 2023-10-04 RX ORDER — CARVEDILOL 6.25 MG/1
6.25 TABLET ORAL 2 TIMES DAILY
Qty: 180 TABLET | Refills: 1 | Status: SHIPPED | OUTPATIENT
Start: 2023-10-04

## 2023-10-05 DIAGNOSIS — I10 ESSENTIAL HYPERTENSION: ICD-10-CM

## 2023-10-05 RX ORDER — HYDROCHLOROTHIAZIDE 25 MG/1
25 TABLET ORAL DAILY
Qty: 90 TABLET | Refills: 1 | Status: SHIPPED | OUTPATIENT
Start: 2023-10-05

## 2023-11-09 ENCOUNTER — TELEPHONE (OUTPATIENT)
Dept: HEMATOLOGY ONCOLOGY | Facility: CLINIC | Age: 68
End: 2023-11-09

## 2023-11-09 ENCOUNTER — APPOINTMENT (OUTPATIENT)
Dept: LAB | Facility: HOSPITAL | Age: 68
End: 2023-11-09
Payer: COMMERCIAL

## 2023-11-09 DIAGNOSIS — E11.69 DIABETES MELLITUS TYPE 2 IN OBESE: ICD-10-CM

## 2023-11-09 DIAGNOSIS — K90.9 MALABSORPTION SYNDROME: ICD-10-CM

## 2023-11-09 DIAGNOSIS — E66.9 DIABETES MELLITUS TYPE 2 IN OBESE: ICD-10-CM

## 2023-11-09 DIAGNOSIS — K90.9 MALABSORPTION SYNDROME: Primary | ICD-10-CM

## 2023-11-09 LAB
ALBUMIN SERPL BCP-MCNC: 4.2 G/DL (ref 3.5–5)
ALP SERPL-CCNC: 67 U/L (ref 34–104)
ALT SERPL W P-5'-P-CCNC: 19 U/L (ref 7–52)
ANION GAP SERPL CALCULATED.3IONS-SCNC: 10 MMOL/L
AST SERPL W P-5'-P-CCNC: 17 U/L (ref 13–39)
BASOPHILS # BLD AUTO: 0.01 THOUSANDS/ÂΜL (ref 0–0.1)
BASOPHILS NFR BLD AUTO: 0 % (ref 0–1)
BILIRUB SERPL-MCNC: 1.1 MG/DL (ref 0.2–1)
BUN SERPL-MCNC: 15 MG/DL (ref 5–25)
CALCIUM SERPL-MCNC: 9.7 MG/DL (ref 8.4–10.2)
CHLORIDE SERPL-SCNC: 101 MMOL/L (ref 96–108)
CO2 SERPL-SCNC: 30 MMOL/L (ref 21–32)
CREAT SERPL-MCNC: 0.91 MG/DL (ref 0.6–1.3)
CREAT UR-MCNC: 80.3 MG/DL
EOSINOPHIL # BLD AUTO: 0.15 THOUSAND/ÂΜL (ref 0–0.61)
EOSINOPHIL NFR BLD AUTO: 2 % (ref 0–6)
ERYTHROCYTE [DISTWIDTH] IN BLOOD BY AUTOMATED COUNT: 12.5 % (ref 11.6–15.1)
EST. AVERAGE GLUCOSE BLD GHB EST-MCNC: 128 MG/DL
FERRITIN SERPL-MCNC: 100 NG/ML (ref 11–307)
GFR SERPL CREATININE-BSD FRML MDRD: 66 ML/MIN/1.73SQ M
GLUCOSE P FAST SERPL-MCNC: 133 MG/DL (ref 65–99)
HBA1C MFR BLD: 6.1 %
HCT VFR BLD AUTO: 40.7 % (ref 34.8–46.1)
HGB BLD-MCNC: 13.3 G/DL (ref 11.5–15.4)
IMM GRANULOCYTES # BLD AUTO: 0.02 THOUSAND/UL (ref 0–0.2)
IMM GRANULOCYTES NFR BLD AUTO: 0 % (ref 0–2)
IRON SATN MFR SERPL: 37 % (ref 15–50)
IRON SERPL-MCNC: 92 UG/DL (ref 50–212)
LYMPHOCYTES # BLD AUTO: 1.9 THOUSANDS/ÂΜL (ref 0.6–4.47)
LYMPHOCYTES NFR BLD AUTO: 26 % (ref 14–44)
MCH RBC QN AUTO: 28.3 PG (ref 26.8–34.3)
MCHC RBC AUTO-ENTMCNC: 32.7 G/DL (ref 31.4–37.4)
MCV RBC AUTO: 87 FL (ref 82–98)
MICROALBUMIN UR-MCNC: <7 MG/L
MICROALBUMIN/CREAT 24H UR: <9 MG/G CREATININE (ref 0–30)
MONOCYTES # BLD AUTO: 0.58 THOUSAND/ÂΜL (ref 0.17–1.22)
MONOCYTES NFR BLD AUTO: 8 % (ref 4–12)
NEUTROPHILS # BLD AUTO: 4.54 THOUSANDS/ÂΜL (ref 1.85–7.62)
NEUTS SEG NFR BLD AUTO: 64 % (ref 43–75)
NRBC BLD AUTO-RTO: 0 /100 WBCS
PLATELET # BLD AUTO: 296 THOUSANDS/UL (ref 149–390)
PMV BLD AUTO: 9.3 FL (ref 8.9–12.7)
POTASSIUM SERPL-SCNC: 3.4 MMOL/L (ref 3.5–5.3)
PROT SERPL-MCNC: 7.1 G/DL (ref 6.4–8.4)
RBC # BLD AUTO: 4.7 MILLION/UL (ref 3.81–5.12)
SODIUM SERPL-SCNC: 141 MMOL/L (ref 135–147)
TIBC SERPL-MCNC: 248 UG/DL (ref 250–450)
UIBC SERPL-MCNC: 156 UG/DL (ref 155–355)
WBC # BLD AUTO: 7.2 THOUSAND/UL (ref 4.31–10.16)

## 2023-11-09 PROCEDURE — 82043 UR ALBUMIN QUANTITATIVE: CPT

## 2023-11-09 PROCEDURE — 82570 ASSAY OF URINE CREATININE: CPT

## 2023-11-09 PROCEDURE — 36415 COLL VENOUS BLD VENIPUNCTURE: CPT

## 2023-11-09 PROCEDURE — 80053 COMPREHEN METABOLIC PANEL: CPT

## 2023-11-09 PROCEDURE — 83550 IRON BINDING TEST: CPT

## 2023-11-09 PROCEDURE — 83036 HEMOGLOBIN GLYCOSYLATED A1C: CPT

## 2023-11-09 PROCEDURE — 82728 ASSAY OF FERRITIN: CPT

## 2023-11-09 PROCEDURE — 83540 ASSAY OF IRON: CPT

## 2023-11-09 PROCEDURE — 85025 COMPLETE CBC W/AUTO DIFF WBC: CPT

## 2023-11-16 ENCOUNTER — HOSPITAL ENCOUNTER (OUTPATIENT)
Dept: INFUSION CENTER | Facility: HOSPITAL | Age: 68
Discharge: HOME | End: 2023-11-16
Attending: INTERNAL MEDICINE
Payer: COMMERCIAL

## 2023-11-16 VITALS
TEMPERATURE: 97.6 F | SYSTOLIC BLOOD PRESSURE: 111 MMHG | OXYGEN SATURATION: 93 % | RESPIRATION RATE: 18 BRPM | DIASTOLIC BLOOD PRESSURE: 61 MMHG | HEART RATE: 66 BPM

## 2023-11-16 DIAGNOSIS — D50.9 IRON DEFICIENCY ANEMIA, UNSPECIFIED IRON DEFICIENCY ANEMIA TYPE: Primary | ICD-10-CM

## 2023-11-16 DIAGNOSIS — Z98.84 HISTORY OF ROUX-EN-Y GASTRIC BYPASS: ICD-10-CM

## 2023-11-16 DIAGNOSIS — E66.01 MORBID OBESITY (HCC): ICD-10-CM

## 2023-11-16 DIAGNOSIS — K90.9 MALABSORPTION SYNDROME: ICD-10-CM

## 2023-11-16 PROCEDURE — 96365 THER/PROPH/DIAG IV INF INIT: CPT

## 2023-11-16 RX ORDER — SODIUM CHLORIDE 9 MG/ML
20 INJECTION, SOLUTION INTRAVENOUS ONCE
OUTPATIENT
Start: 2024-02-08

## 2023-11-16 RX ORDER — SODIUM CHLORIDE 9 MG/ML
20 INJECTION, SOLUTION INTRAVENOUS ONCE
Status: COMPLETED | OUTPATIENT
Start: 2023-11-16 | End: 2023-11-16

## 2023-11-16 RX ADMIN — SODIUM CHLORIDE 20 ML/HR: 0.9 INJECTION, SOLUTION INTRAVENOUS at 10:19

## 2023-11-16 RX ADMIN — FERUMOXYTOL 510 MG: 510 INJECTION INTRAVENOUS at 10:19

## 2023-11-16 NOTE — PLAN OF CARE
Problem: INFECTION - ADULT  Goal: Absence of fever/infection during neutropenic period  Description: INTERVENTIONS:  - Monitor WBC    Outcome: Progressing     Problem: DISCHARGE PLANNING  Goal: Discharge to home or other facility with appropriate resources  Description: INTERVENTIONS:  - Identify barriers to discharge w/patient and caregiver  - Arrange for needed discharge resources and transportation as appropriate  - Identify discharge learning needs (meds, wound care, etc.)  - Arrange for interpretive services to assist at discharge as needed  - Refer to Case Management Department for coordinating discharge planning if the patient needs post-hospital services based on physician/advanced practitioner order or complex needs related to functional status, cognitive ability, or social support system  Outcome: Progressing     Problem: Knowledge Deficit  Goal: Patient/family/caregiver demonstrates understanding of disease process, treatment plan, medications, and discharge instructions  Description: Complete learning assessment and assess knowledge base.   Interventions:  - Provide teaching at level of understanding  - Provide teaching via preferred learning methods  Outcome: Progressing

## 2023-11-21 ENCOUNTER — OFFICE VISIT (OUTPATIENT)
Dept: FAMILY MEDICINE CLINIC | Facility: CLINIC | Age: 68
End: 2023-11-21
Payer: COMMERCIAL

## 2023-11-21 VITALS
BODY MASS INDEX: 35.17 KG/M2 | DIASTOLIC BLOOD PRESSURE: 68 MMHG | TEMPERATURE: 97 F | HEART RATE: 68 BPM | HEIGHT: 64 IN | WEIGHT: 206 LBS | RESPIRATION RATE: 16 BRPM | SYSTOLIC BLOOD PRESSURE: 128 MMHG

## 2023-11-21 DIAGNOSIS — E78.5 DYSLIPIDEMIA: ICD-10-CM

## 2023-11-21 DIAGNOSIS — Z12.31 ENCOUNTER FOR SCREENING MAMMOGRAM FOR MALIGNANT NEOPLASM OF BREAST: ICD-10-CM

## 2023-11-21 DIAGNOSIS — I10 ESSENTIAL HYPERTENSION: ICD-10-CM

## 2023-11-21 DIAGNOSIS — Z23 ENCOUNTER FOR IMMUNIZATION: ICD-10-CM

## 2023-11-21 DIAGNOSIS — Z00.00 MEDICARE ANNUAL WELLNESS VISIT, INITIAL: Primary | ICD-10-CM

## 2023-11-21 DIAGNOSIS — E66.9 DIABETES MELLITUS TYPE 2 IN OBESE: ICD-10-CM

## 2023-11-21 DIAGNOSIS — Z98.84 HISTORY OF ROUX-EN-Y GASTRIC BYPASS: ICD-10-CM

## 2023-11-21 DIAGNOSIS — E55.9 VITAMIN D DEFICIENCY: ICD-10-CM

## 2023-11-21 DIAGNOSIS — E03.9 ACQUIRED HYPOTHYROIDISM: ICD-10-CM

## 2023-11-21 DIAGNOSIS — E11.69 DIABETES MELLITUS TYPE 2 IN OBESE: ICD-10-CM

## 2023-11-21 PROCEDURE — G0439 PPPS, SUBSEQ VISIT: HCPCS | Performed by: FAMILY MEDICINE

## 2023-11-21 PROCEDURE — 90662 IIV NO PRSV INCREASED AG IM: CPT

## 2023-11-21 PROCEDURE — G0008 ADMIN INFLUENZA VIRUS VAC: HCPCS

## 2023-11-21 PROCEDURE — 99214 OFFICE O/P EST MOD 30 MIN: CPT | Performed by: FAMILY MEDICINE

## 2023-11-21 RX ORDER — LEVOTHYROXINE SODIUM 0.12 MG/1
125 TABLET ORAL DAILY
Qty: 90 TABLET | Refills: 1 | Status: SHIPPED | OUTPATIENT
Start: 2023-11-21

## 2023-11-21 NOTE — PROGRESS NOTES
Assessment/Plan:    Problem List Items Addressed This Visit     Hypothyroidism   Other Visit Diagnoses     Encounter for immunization    -  Primary    Relevant Orders    influenza vaccine, high-dose, PF 0.7 mL (FLUZONE HIGH-DOSE) (Completed)    Encounter for screening mammogram for malignant neoplasm of breast               Diagnoses and all orders for this visit:    Encounter for immunization  -     influenza vaccine, high-dose, PF 0.7 mL (FLUZONE HIGH-DOSE)    Acquired hypothyroidism    Encounter for screening mammogram for malignant neoplasm of breast        No problem-specific Assessment & Plan notes found for this encounter. PHQ-2/9 Depression Screening            Body mass index is 35.36 kg/m². BMI Counseling: Body mass index is 35.36 kg/m². The BMI {VB BMI Counselin}    Subjective:      Patient ID: Isrrael Aguilar is a 74 y.o. female. HPI    The following portions of the patient's history were reviewed and updated as appropriate:   She has a past medical history of Anemia, Anesthesia complication, Diabetes mellitus ([address]), Disease of thyroid gland, Hyperlipidemia, Hypertension, Hypothyroidism, Morbid obesity ([address]), Pancreatitis (1997), Sleep apnea, Type 2 diabetes mellitus ([address]), Vitamin D deficiency, and Wears glasses. ,  does not have any pertinent problems on file. ,   has a past surgical history that includes Gastric bypass (2013); Cholecystectomy (2013); Total knee arthroplasty (Left, ); Breast excisional biopsy (Left, 2001); Colonoscopy; EGD; Tonsillectomy; Joint replacement; pr excision skin abd infraumbilical panniculectomy (N/A, 2/15/2023); pr excision excessive skin & subq tissue arm (Bilateral, 2023); and pr suction assisted lipectomy lower extremity (Bilateral, 2023). ,  family history includes Brain cancer in her mother; Breast cancer in her maternal aunt, maternal aunt, and mother; Cancer in her paternal aunt, paternal aunt, paternal aunt, paternal grandfather, and paternal grandmother; Heart attack in her father; Lung cancer in her mother and paternal aunt; No Known Problems in her daughter, maternal grandfather, maternal grandmother, sister, sister, sister, and sister; Stroke in her sister. ,   reports that she has never smoked. She has never used smokeless tobacco. She reports that she does not currently use alcohol. She reports that she does not use drugs. ,  is allergic to aspirin, codeine, nsaids, statins, and wound dressing adhesive. .  Current Outpatient Medications   Medication Sig Dispense Refill   • amLODIPine (NORVASC) 5 mg tablet Take 1 tablet (5 mg total) by mouth daily 90 tablet 1   • b complex vitamins capsule Take 1 capsule by mouth daily     • Blood Glucose Monitoring Suppl ([employer] Blood Glucose Monitor) CARLOS Use in the morning 1 each 0   • carvedilol (COREG) 6.25 mg tablet TAKE 1 TABLET BY MOUTH TWICE A  tablet 1   • ergocalciferol (VITAMIN D2) 50,000 units Take 1 capsule (50,000 Units total) by mouth once a week 12 capsule 2   • ezetimibe (ZETIA) 10 mg tablet Take 1 tablet (10 mg total) by mouth daily 90 tablet 1   • fluticasone (FLONASE) 50 mcg/act nasal spray SPRAY 1 SPRAY INTO EACH NOSTRIL EVERY DAY (Patient taking differently: 1 spray into each nostril if needed for allergies) 48 mL 4   • glucose blood test strip Use 1 each daily Use as instructed 100 strip 11   • hydrochlorothiazide (HYDRODIURIL) 25 mg tablet TAKE 1 TABLET (25 MG TOTAL) BY MOUTH DAILY.  90 tablet 1   • Lancets MISC Use daily 100 each 11   • levothyroxine 125 mcg tablet Take 1 tablet (125 mcg total) by mouth daily 90 tablet 1   • metFORMIN (GLUCOPHAGE-XR) 500 mg 24 hr tablet Take 4 tablets (2,000 mg total) by mouth daily with breakfast 360 tablet 1   • Multiple Vitamin (multivitamin) tablet Take 1 tablet by mouth daily     • sitaGLIPtin (Januvia) 50 mg tablet Take 1 tablet (50 mg total) by mouth daily 90 tablet 1     No current facility-administered medications for this visit. Review of Systems      Objective:    /68   Pulse 68   Temp (!) 97 °F (36.1 °C)   Resp 16   Ht 5' 4" (1.626 m)   Wt 93.4 kg (206 lb)   BMI 35.36 kg/m²   Body mass index is 35.36 kg/m².      Physical Exam

## 2023-11-21 NOTE — PROGRESS NOTES
Assessment/Plan: diabetes mellitus type 2 with a hemoglobin A1c of 7 9 we will increase the metformin from 500 mg b i d  to a 1000 mg b i d  and recheck a hemoglobin A1c in 3 months  Chronic urticaria the patient was encouraged to take Pepcid 20 mg twice a day and also to take the Claritin twice a day to controlled itch with chronic  Hives  Hypertensive cardiovascular disease with blood pressure well controlled on the current regimen    Hypothyroidism the patient euthyroid on the current thyroxine dose    Hyperlipidemia on Zetia 10 mg daily with lab pending    Morbid obesity class 3 diet discussed    Problem List Items Addressed This Visit     None      Visit Diagnoses     Acquired hypothyroidism        Relevant Medications    levothyroxine 125 mcg tablet    carvedilol (COREG) 6 25 mg tablet    Dyslipidemia        Relevant Medications    ezetimibe (ZETIA) 10 mg tablet    Essential hypertension        Relevant Medications    carvedilol (COREG) 6 25 mg tablet    Diabetes mellitus type 2 in obese (HCC)        Relevant Medications    metFORMIN (GLUCOPHAGE) 1000 MG tablet    Other Relevant Orders    Hemoglobin A1C    Comprehensive metabolic panel    Microalbumin / creatinine urine ratio           Diagnoses and all orders for this visit:    Acquired hypothyroidism  -     levothyroxine 125 mcg tablet; Take 1 tablet (125 mcg total) by mouth daily    Dyslipidemia  -     ezetimibe (ZETIA) 10 mg tablet; Take 1 tablet (10 mg total) by mouth daily    Essential hypertension  -     carvedilol (COREG) 6 25 mg tablet; Take 1 tablet (6 25 mg total) by mouth 2 (two) times a day    Diabetes mellitus type 2 in obese (HCC)  -     metFORMIN (GLUCOPHAGE) 1000 MG tablet; Take 1 tablet (1,000 mg total) by mouth 2 (two) times a day  -     Hemoglobin A1C; Future  -     Comprehensive metabolic panel; Future  -     Microalbumin / creatinine urine ratio; Future    Other orders  -     Cancel: metFORMIN (GLUCOPHAGE) 500 mg tablet;  Take 1 [facility] [facility]: [phone]  [facility]: [phone]  [facility]: [phone] ([place])  [facility]: [phone] ([place])  [facility]: [phone] or [phone]  [facility]: [phone] or [phone] (Car reviews for cheapest drug price    tablet (500 mg total) by mouth 2 (two) times a day        No problem-specific Assessment & Plan notes found for this encounter  PHQ-9 Depression Screening    PHQ-9:   Frequency of the following problems over the past two weeks: There is no height or weight on file to calculate BMI  BMI Counseling: There is no height or weight on file to calculate BMI  The BMI is above normal  Nutrition recommendations include reducing portion sizes, decreasing overall calorie intake, 3-5 servings of fruits/vegetables daily, reducing fast food intake, consuming healthier snacks, decreasing soda and/or juice intake, moderation in carbohydrate intake, increasing intake of lean protein, reducing intake of saturated fat and trans fat and reducing intake of cholesterol  Subjective:      Patient ID: Jim Otero is a 63 y o  female  HPI    The following portions of the patient's history were reviewed and updated as appropriate:   She has a past medical history of Hyperlipidemia, Hypertension, Hypothyroidism, Morbid obesity ([facility] [address] ), Pancreatitis (09/1997), Sleep apnea, Type 2 diabetes mellitus ([facility] ), and Vitamin D deficiency  ,  does not have a problem list on file  ,   has a past surgical history that includes Breast excisional biopsy (Left, 2001); Gastric bypass (05/2013); Cholecystectomy (04/2013); Total knee arthroplasty (Left, 2013); Breast excisional biopsy (Left, 07/2001); and Mammo (historical) (Bilateral, 12/2016)  ,  family history includes Brain cancer in her mother; Breast cancer in her maternal aunt, maternal aunt, and mother; Cancer in her paternal aunt, paternal aunt, paternal aunt, paternal grandfather, and paternal grandmother; Heart attack in her father; Lung cancer in her mother and paternal aunt; No Known Problems in her daughter, sister, sister, sister, sister, and sister  ,   reports that she has never smoked  She has never used smokeless tobacco  She reports previous alcohol use   She reports that she does not use drugs  ,  is allergic to aspirin and nsaids     Current Outpatient Medications   Medication Sig Dispense Refill    amLODIPine (NORVASC) 5 mg tablet take 1 tablet by mouth daily 90 tablet 1    carvedilol (COREG) 6 25 mg tablet Take 1 tablet (6 25 mg total) by mouth 2 (two) times a day 180 tablet 1    ergocalciferol (VITAMIN D2) 50,000 units Take 1 capsule (50,000 Units total) by mouth once a week 12 capsule 2    ezetimibe (ZETIA) 10 mg tablet Take 1 tablet (10 mg total) by mouth daily 90 tablet 1    hydrochlorothiazide (HYDRODIURIL) 25 mg tablet Take 1 tablet (25 mg total) by mouth daily 90 tablet 1    levothyroxine 125 mcg tablet Take 1 tablet (125 mcg total) by mouth daily 90 tablet 1    metFORMIN (GLUCOPHAGE) 1000 MG tablet Take 1 tablet (1,000 mg total) by mouth 2 (two) times a day 180 tablet 4     No current facility-administered medications for this visit  Review of Systems      Objective:    /74   Pulse 84   Temp 97 9 °F (36 6 °C)   Resp 20   There is no height or weight on file to calculate BMI       Physical Exam that, and say to themselves why didn't you tag me? What's up with me?     You read that message they sent and they see that you did, but you didn't reply.. why didn't you reply? And they feel like they have done something to upset you?     You didn't say I love you on the phone.. do you not love them anymore? Do you love someone else instead?     They just made a comment about them.. was it a joke? Was that person supposed to laugh? Or did they mean it? Are they being nice? Are they talking about them? Do they talk about them? And they then think I bet they don't like me really.     They say sorry all the time. They feel like they annoy everyone. Are you mad at me? Because they feel like you're mad at them ALL.THE.TIME!    And for all those questions they will spend hours trying to answer. Let it all build up in their mind, until it sends them to tears...... it's mental isn't it!!! They see things that way.    It's not only mental changes, but physical changes. They don't eat a lot, mainly rubbish, because they need it now and need the energy from lack of sleep. Insomnia, up all night answering questions to situations that don't even exist, or sleep too much and waste half their day still feeling tired.     They still smile and they have every excuse for when you ask why.    But these tablets can help them.     Because they know when they start to feel this way or think this way, they need help.     They know that when their behavior starts to change, they need guidance.     And they understand that they don't need to be ashamed. They don't need to be understood. They just need to be accepted. Everyone is fighting a gaviria and sometimes you need to be kinder.     Living with this illness is hard, but trying to understand it, is even harder. It's also 100 times harder if they have another condition on top of this.    Don't suffer in silence. Take the pill. ?    #speakout #mentalhealthawareness   Healthy Tips and  Recommendations    Exercise for 30 minutes 3-5 times per week.  This would be brisk walking, running, biking, swimming, etc.      Eat 3 healthy, well-rounded meals per day and 2 small snacks per day.    Important things to remember when choosing foods:   1 gram of protein = 4 calories   1 gram of carbohydrate = 4 calories   1 gram of fat = 9 calories (twice as much as protein and carb)    Intake of Macronutrients - how much should you get from each:     Protein:  40% of your calories   Carbs (Carbohydrates):  30% of your calories   Fats:  20% or less of your calories     Decrease your bad carbs and increase your good carbohydrate intake:     Veggies are good carbs, pasta and sugars are bad carbs (unless the pasta is wheat or veggie pasta)    Eat 1-2 servings of vegetables with each meal (3-6 servings per day)   One serving of veggies = enough to fill your two hands cupped together.    Eat 2-3 servings of fruits per day  (if you are trying to lose weight, try to limit your fruit intake to 2 servings per day as it is higher in sugar content)   One serving of fruit = medium size piece    Eat whole grains such as wheat bread and wheat rice.    Protein:  Try to eat about 20-25 grams of protein per meal and 10 grams of protein in your snacks.    Examples of good sources of protein are:   Greek yogurt, chicken, beans (legumes), cottage cheese (low fat or non-fat), tuna, natural peanut butter (no-stir is good), protein bars or drinks, milk, string cheese, almonds, fish, beef jerky (although this is high in sodium), soy milk, hummus, tofu.    Fish oil or krill oil:  2000 mg daily  Vitamin D 3: 1149-0766 units daily    Avoid soda (diet or regular) as it either has high amounts of sugar or aspartame, which has been linked to weight gain and diabetes.    We should be drinking half our ideal body weight in ounces of water per day.     Take your ideal body weight divided by 2.  That is the number in ounces of water you should be  [facility], www.[employer].  Marshfield Medical Center - Ladysmith Rusk CountyIsabelle.  David Ortiz,  2347-8882 [employer]. All rights reserved. This information is not intended as a substitute for professional medical care. Always follow your healthcare professional's instructions.

## 2023-11-21 NOTE — PROGRESS NOTES
Assessment and Plan: Diabetes mellitus type 2 with a hemoglobin A1c of 6.1 doing well on Glucophage  4 tablets daily and Januvia 50 mg daily    Hyperlipidemia laboratories pending    Essential hypertension with a blood pressure controlled on current regimen    Status post gastric bypass with a current BMI of 35.36 down from 36.56. The BMI before Hima-en-Y was 44    Patient defers DEXA scan    Acquired hypothyroidism euthyroid on current regimen            Diabetic Foot Exam    Patient's shoes and socks removed. Right Foot/Ankle   Right Foot Inspection  Skin Exam: skin normal and skin intact. No dry skin, no warmth, no callus, no erythema, no maceration, no abnormal color, no pre-ulcer, no ulcer and no callus. Toe Exam: ROM and strength within normal limits and swelling. Sensory   Vibration: intact  Proprioception: intact  Monofilament testing: intact    Vascular  Capillary refills: < 3 seconds  The right DP pulse is 2+. The right PT pulse is 2+. Left Foot/Ankle  Left Foot Inspection  Skin Exam: skin normal and skin intact. No dry skin, no warmth, no erythema, no maceration, normal color, no pre-ulcer, no ulcer and no callus. Toe Exam: ROM and strength within normal limits. Sensory   Vibration: intact  Proprioception: intact  Monofilament testing: intact    Vascular  Capillary refills: < 3 seconds  The left DP pulse is 2+. The left PT pulse is 2+.      Assign Risk Category  No deformity present  No loss of protective sensation  No weak pulses  Risk: 0       Problem List Items Addressed This Visit       Diabetes mellitus type 2 in obese     Relevant Orders    Albumin / creatinine urine ratio    Comprehensive metabolic panel    Hemoglobin A1C    TSH, 3rd generation with Free T4 reflex    Lipid Panel with Direct LDL reflex    Hypothyroidism    Relevant Medications    levothyroxine 125 mcg tablet    History of Hima-en-Y gastric bypass     Other Visit Diagnoses       Medicare annual wellness visit, initial    -  Primary    Dyslipidemia        Essential hypertension        Encounter for screening mammogram for malignant neoplasm of breast        Relevant Orders    Mammo screening bilateral w 3d & cad    Encounter for immunization        Relevant Orders    influenza vaccine, high-dose, PF 0.7 mL (FLUZONE HIGH-DOSE) (Completed)    Vitamin D deficiency        Relevant Orders    Vitamin D 25 hydroxy          BMI Counseling: Body mass index is 35.36 kg/m². The BMI is above normal. Nutrition recommendations include decreasing portion sizes, encouraging healthy choices of fruits and vegetables, decreasing fast food intake, consuming healthier snacks, limiting drinks that contain sugar, moderation in carbohydrate intake, increasing intake of lean protein, reducing intake of saturated and trans fat and reducing intake of cholesterol. Exercise recommendations include moderate physical activity 150 minutes/week. Rationale for BMI follow-up plan is due to patient being overweight or obese. Preventive health issues were discussed with patient, and age appropriate screening tests were ordered as noted in patient's After Visit Summary. Personalized health advice and appropriate referrals for health education or preventive services given if needed, as noted in patient's After Visit Summary. History of Present Illness:     Patient presents for a Medicare Wellness Visit    Patient presents for Medicare annual wellness visit    Medication Refill  Pertinent negatives include no abdominal pain, arthralgias, chest pain, chills, coughing, fever, rash, sore throat or vomiting. Patient Care Team:  Emy Zhou,  as PCP - General     Review of Systems:     Review of Systems   Constitutional:  Negative for chills and fever. HENT:  Negative for ear pain and sore throat. Eyes:  Negative for pain and visual disturbance. Respiratory:  Negative for cough and shortness of breath.     Cardiovascular:  Negative for chest pain and palpitations. Gastrointestinal:  Negative for abdominal pain and vomiting. Genitourinary:  Negative for dysuria and hematuria. Musculoskeletal:  Negative for arthralgias and back pain. Bilateral foot pain left worse than right   Skin:  Negative for color change and rash. Neurological:  Negative for seizures and syncope. All other systems reviewed and are negative.        Problem List:     Patient Active Problem List   Diagnosis    Morbid obesity ([address])    Diabetes mellitus type 2 in obese     HTN (hypertension)    Hyperlipidemia    Hypothyroidism    Sleep apnea    Lipodystrophy    History of Hima-en-Y gastric bypass    Malabsorption syndrome    Iron deficiency anemia    Encounter for cosmetic surgery      Past Medical and Surgical History:     Past Medical History:   Diagnosis Date    Anemia     Anesthesia complication     Facial swelling    Diabetes mellitus ([address])     Disease of thyroid gland     Hyperlipidemia     Hypertension     Hypothyroidism     Morbid obesity ([address])     Pancreatitis 09/1997    Sleep apnea     no cpap machine used    Type 2 diabetes mellitus ([address])     Vitamin D deficiency     Wears glasses      Past Surgical History:   Procedure Laterality Date    BREAST EXCISIONAL BIOPSY Left 07/2001    benign    CHOLECYSTECTOMY  04/2013    COLONOSCOPY      EGD      GASTRIC BYPASS  05/2013    JOINT REPLACEMENT      WY EXCISION EXCESSIVE SKIN & SUBQ TISSUE ARM Bilateral 9/26/2023    Procedure: BRACHIOPLASTY;  Surgeon: Dell Ordonez MD;  Location: [facility] MAIN OR;  Service: Plastics    WY EXCISION SKIN ABD INFRAUMBILICAL PANNICULECTOMY N/A 2/15/2023    Procedure: Carline Dover;  Surgeon: Dell Ordonez MD;  Location: [facility] MAIN OR;  Service: Plastics    WY SUCTION ASSISTED LIPECTOMY LOWER EXTREMITY Bilateral 9/26/2023    Procedure: LIPOSUCTION OF LATERAL & MEDIAL THIGHS;  Surgeon: Dell Ordonez MD;  Location:  MAIN OR;  Service: Plastics    TONSILLECTOMY      TOTAL KNEE ARTHROPLASTY Left 2013      Family History:     Family History   Problem Relation Age of Onset    Breast cancer Mother     Lung cancer Mother     Brain cancer Mother     Heart attack Father     Stroke Sister     No Known Problems Sister     No Known Problems Sister     No Known Problems Sister     No Known Problems Sister     No Known Problems Daughter     No Known Problems Maternal Grandmother     No Known Problems Maternal Grandfather     Cancer Paternal Grandmother         unknown origin     Cancer Paternal Grandfather         unknown origin     Breast cancer Maternal Aunt     Breast cancer Maternal Aunt     Cancer Paternal Aunt         unknown origin    Cancer Paternal Aunt         unknown origin    Cancer Paternal Aunt         unknown origin    Lung cancer Paternal Aunt     BRCA2 Positive Neg Hx     BRCA2 Negative Neg Hx     BRCA1 Positive Neg Hx     Breast cancer additional onset Neg Hx     BRCA1 Negative Neg Hx     BRCA 1/2 Neg Hx     Endometrial cancer Neg Hx     Ovarian cancer Neg Hx     Colon cancer Neg Hx       Social History:     Social History     Socioeconomic History    Marital status: /Civil Union     Spouse name: None    Number of children: None    Years of education: None    Highest education level: None   Occupational History    None   Tobacco Use    Smoking status: Never    Smokeless tobacco: Never   Vaping Use    Vaping Use: Never used   Substance and Sexual Activity    Alcohol use: Not Currently     Comment: social    Drug use: Never    Sexual activity: Yes     Partners: Male   Other Topics Concern    None   Social History Narrative    None     Social Determinants of Health     Financial Resource Strain: Low Risk  (11/21/2023)    Overall Financial Resource Strain (CARDIA)     Difficulty of Paying Living Expenses: Not very hard   Food Insecurity: Not on file   Transportation Needs: No Transportation Needs (11/21/2023)    PRAPARE - Transportation     Lack of Transportation (Medical): No     Lack of Transportation (Non-Medical): No   Physical Activity: Not on file   Stress: Not on file   Social Connections: Not on file   Intimate Partner Violence: Not on file   Housing Stability: Not on file      Medications and Allergies:     Current Outpatient Medications   Medication Sig Dispense Refill    levothyroxine 125 mcg tablet Take 1 tablet (125 mcg total) by mouth daily 90 tablet 1    amLODIPine (NORVASC) 5 mg tablet Take 1 tablet (5 mg total) by mouth daily 90 tablet 1    b complex vitamins capsule Take 1 capsule by mouth daily      Blood Glucose Monitoring Suppl ([employer] Blood Glucose Monitor) CARLOS Use in the morning 1 each 0    carvedilol (COREG) 6.25 mg tablet TAKE 1 TABLET BY MOUTH TWICE A  tablet 1    ergocalciferol (VITAMIN D2) 50,000 units Take 1 capsule (50,000 Units total) by mouth once a week 12 capsule 2    ezetimibe (ZETIA) 10 mg tablet Take 1 tablet (10 mg total) by mouth daily 90 tablet 1    fluticasone (FLONASE) 50 mcg/act nasal spray SPRAY 1 SPRAY INTO EACH NOSTRIL EVERY DAY (Patient taking differently: 1 spray into each nostril if needed for allergies) 48 mL 4    glucose blood test strip Use 1 each daily Use as instructed 100 strip 11    hydrochlorothiazide (HYDRODIURIL) 25 mg tablet TAKE 1 TABLET (25 MG TOTAL) BY MOUTH DAILY. 90 tablet 1    Lancets MISC Use daily 100 each 11    metFORMIN (GLUCOPHAGE-XR) 500 mg 24 hr tablet Take 4 tablets (2,000 mg total) by mouth daily with breakfast 360 tablet 1    Multiple Vitamin (multivitamin) tablet Take 1 tablet by mouth daily      sitaGLIPtin (Januvia) 50 mg tablet Take 1 tablet (50 mg total) by mouth daily 90 tablet 1     No current facility-administered medications for this visit.      Allergies   Allergen Reactions    Aspirin Angioedema    Codeine Angioedema    Nsaids Angioedema    Statins Other (See Comments) and Angioedema     Patient states allergic to statins    Wound Dressing Adhesive Rash      Immunizations:     Immunization History Administered Date(s) Administered    COVID-19 PFIZER VACCINE 0.3 ML IM 03/11/2021, 03/31/2021, 11/09/2021    H1N1, All Formulations 12/30/2009    INFLUENZA 10/18/2010, 09/18/2017, 10/13/2020, 11/04/2022    Influenza, high dose seasonal 0.7 mL 11/21/2023    Influenza, injectable, quadrivalent, preservative free 0.5 mL 10/13/2020    Pneumococcal Polysaccharide PPV23 10/13/2020    Zoster 02/05/2021    Zoster Vaccine Recombinant 05/10/2021      Health Maintenance:         Topic Date Due    Breast Cancer Screening: Mammogram  12/30/2023    Colorectal Cancer Screening  08/25/2027    HIV Screening  Completed    Hepatitis C Screening  Completed         Topic Date Due    Pneumococcal Vaccine: 65+ Years (2 - PCV) 10/13/2021      Medicare Screening Tests and Risk Assessments:         Health Risk Assessment:   Patient rates overall health as good. Patient feels that their physical health rating is same. Eyesight was rated as same. Hearing was rated as same. Patient feels that their emotional and mental health rating is same. Patients states they are never, rarely angry. Patient states they are never, rarely unusually tired/fatigued. Pain experienced in the last 7 days has been some. Patient's pain rating has been 5/10. Patient states that she has experienced no weight loss or gain in last 6 months. Fall Risk Screening: In the past year, patient has experienced: no history of falling in past year      Urinary Incontinence Screening:   Patient has not leaked urine accidently in the last six months. Home Safety:  Patient does not have trouble with stairs inside or outside of their home. Nutrition:   Current diet is Regular. Medications:   Patient is currently taking over-the-counter supplements. OTC medications include: see medication list. Patient is able to manage medications.      Activities of Daily Living (ADLs)/Instrumental Activities of Daily Living (IADLs):   Walk and transfer into and out of bed and chair?: Yes  Dress and groom yourself?: Yes    Bathe or shower yourself?: Yes    Feed yourself? Yes  Do your laundry/housekeeping?: Yes  Manage your money, pay your bills and track your expenses?: Yes  Make your own meals?: Yes    Do your own shopping?: Yes    Previous Hospitalizations:   Any hospitalizations or ED visits within the last 12 months?: Yes      Advance Care Planning:   Living will: Yes    Advanced directive: Yes      PREVENTIVE SCREENINGS      Cardiovascular Screening:    General: Screening Not Indicated and History Lipid Disorder      Diabetes Screening:     General: Screening Not Indicated and History Diabetes      Colorectal Cancer Screening:     General: Screening Current      Breast Cancer Screening:     General: Screening Current      Cervical Cancer Screening:    General: Screening Not Indicated      Lung Cancer Screening:     General: Screening Not Indicated      Hepatitis C Screening:    General: Screening Current    Screening, Brief Intervention, and Referral to Treatment (SBIRT)    Screening  Typical number of drinks in a day: 0  Typical number of drinks in a week: 0  Interpretation: Low risk drinking behavior. Single Item Drug Screening:  How often have you used an illegal drug (including marijuana) or a prescription medication for non-medical reasons in the past year? never    Single Item Drug Screen Score: 0  Interpretation: Negative screen for possible drug use disorder    No results found. Physical Exam:     /68   Pulse 68   Temp (!) 97 °F (36.1 °C)   Resp 16   Ht 5' 4" (1.626 m)   Wt 93.4 kg (206 lb)   BMI 35.36 kg/m²     Physical Exam  Vitals and nursing note reviewed. Constitutional:       General: She is not in acute distress. Appearance: She is well-developed. HENT:      Head: Normocephalic and atraumatic.       Right Ear: Tympanic membrane, ear canal and external ear normal.      Left Ear: Tympanic membrane, ear canal and external ear normal.      Nose: Nose normal.      Mouth/Throat:      Mouth: Mucous membranes are moist.      Pharynx: Oropharynx is clear. Eyes:      Extraocular Movements: Extraocular movements intact. Conjunctiva/sclera: Conjunctivae normal.      Pupils: Pupils are equal, round, and reactive to light. Cardiovascular:      Rate and Rhythm: Normal rate and regular rhythm. Pulses: no weak pulses          Dorsalis pedis pulses are 2+ on the right side and 2+ on the left side. Posterior tibial pulses are 2+ on the right side and 2+ on the left side. Heart sounds: No murmur heard. Pulmonary:      Effort: Pulmonary effort is normal. No respiratory distress. Breath sounds: Normal breath sounds. Abdominal:      General: Abdomen is flat. Bowel sounds are normal.      Palpations: Abdomen is soft. Tenderness: There is no abdominal tenderness. Musculoskeletal:         General: No swelling. Normal range of motion. Cervical back: Normal range of motion and neck supple. Feet:      Right foot:      Skin integrity: No ulcer, skin breakdown, erythema, warmth, callus or dry skin. Left foot:      Skin integrity: No ulcer, skin breakdown, erythema, warmth, callus or dry skin. Skin:     General: Skin is warm and dry. Capillary Refill: Capillary refill takes less than 2 seconds. Neurological:      General: No focal deficit present. Mental Status: She is alert and oriented to person, place, and time. Psychiatric:         Mood and Affect: Mood normal.         Behavior: Behavior normal.         Thought Content:  Thought content normal.          Yuni Alvarez DO

## 2023-11-24 ENCOUNTER — OFFICE VISIT (OUTPATIENT)
Dept: PODIATRY | Age: 68
End: 2023-11-24
Payer: COMMERCIAL

## 2023-11-24 ENCOUNTER — HOSPITAL ENCOUNTER (OUTPATIENT)
Dept: RADIOLOGY | Facility: CLINIC | Age: 68
End: 2023-11-24
Payer: COMMERCIAL

## 2023-11-24 VITALS
WEIGHT: 211.4 LBS | HEIGHT: 64 IN | BODY MASS INDEX: 36.09 KG/M2 | SYSTOLIC BLOOD PRESSURE: 124 MMHG | DIASTOLIC BLOOD PRESSURE: 74 MMHG | HEART RATE: 60 BPM | OXYGEN SATURATION: 99 %

## 2023-11-24 DIAGNOSIS — M20.12 HAV (HALLUX ABDUCTO VALGUS), LEFT: ICD-10-CM

## 2023-11-24 DIAGNOSIS — M79.672 LEFT FOOT PAIN: ICD-10-CM

## 2023-11-24 DIAGNOSIS — M79.672 LEFT FOOT PAIN: Primary | ICD-10-CM

## 2023-11-24 DIAGNOSIS — M19.072 ARTHRITIS OF LEFT FOOT: ICD-10-CM

## 2023-11-24 DIAGNOSIS — M21.42 PES PLANUS OF LEFT FOOT: ICD-10-CM

## 2023-11-24 DIAGNOSIS — M77.42 METATARSALGIA OF LEFT FOOT: ICD-10-CM

## 2023-11-24 PROCEDURE — 99203 OFFICE O/P NEW LOW 30 MIN: CPT | Performed by: STUDENT IN AN ORGANIZED HEALTH CARE EDUCATION/TRAINING PROGRAM

## 2023-11-24 PROCEDURE — 73630 X-RAY EXAM OF FOOT: CPT

## 2023-11-24 NOTE — PROGRESS NOTES
Assessment/Plan:     Diagnoses and all orders for this visit:    Left foot pain  -     X-ray foot left 3+ views; Future  -     Ambulatory referral to Physical Therapy; Future    Pes planus of left foot  -     Ambulatory referral to Physical Therapy; Future    Arthritis of left foot  -     Ambulatory referral to Physical Therapy; Future    Hav (hallux abducto valgus), left  -     Ambulatory referral to Physical Therapy; Future    Metatarsalgia of left foot  -     Ambulatory referral to Physical Therapy; Future          Imaging Reviewed at this visit (I personally reviewed/independently interpreted images and reports in PACS)  XR left foot WB 3v 11/24/23: pes planus (collapse of MLA, talar head uncovering, decreased calcaneal inclination and increased talar declination) with elevation of 1st metatarsal. Plantar and posterior calcaneal spurs. Mild metatarsus adductus with HAV mild (GUS 10, sesamoid pos 4). Mild hammering 2nd toe. No acute osseous abnormalities noted. IMPRESSION:  Left pes planus with lateral column pain  Left HAV with elevation of 1st metatarsal and submet 2/3 pain/MTPJ 2/3 with mild 2nd toe swelling (possibly arthritic)  DM, A1c 6.1% 11/9/23     PLAN:  I reviewed clinical exam and radiographic imaging (XR) with patient in detail today. I have discussed with the patient the pathophysiology of this diagnosis and reviewed how the examination correlates with this diagnosis. I reviewed PCP note from 11/21/23 (yearly DFE performed at that time)  I explained at length the biomechanical abnormalities leading to overload of metatarsals, lateral column in the setting of pes planus, HAV  I recommend stiff bottomed sneakers/shoes (ex Asics, Vionic, New balance, Schmidt, etc) for daily use and Balbina Jacinto (recovery slides) for in home use.    I advised pt to obtain OTC orthotics such as Dananberg arch support  I recommended topical pain creams such as voltarin gel or biofreeze  I ordered the addition of PT at this visit to aide in reduction of equinus reduce pedal pain  I dispensed trial budin splint, dancers pad, toe spacer for patient to trial  RTC 6wks for recheck; consider gabapentin. Subjective:      Patient ID: Luanne Epley is a 74 y.o. female. [employer] presents to clinic today concerning left foot pain present for about one month without trauma. Notes "pain all over" especially at the end of the day. Upon further questioning, her pain is to the outside of the foot (top) and bottom near toes 2/3. With WB. Note some burning of feet at night. +DM. The following portions of the patient's history were reviewed and updated as appropriate: allergies, current medications, past family history, past medical history, past social history, past surgical history, and problem list.    Review of Systems   Constitutional:  Negative for activity change, chills and fever. HENT: Negative. Respiratory:  Negative for cough, chest tightness and shortness of breath. Cardiovascular:  Negative for chest pain and leg swelling. Endocrine: Negative. Genitourinary: Negative. Musculoskeletal:  Positive for gait problem (L foot pain). Neurological:  Negative for numbness. Psychiatric/Behavioral: Negative. Negative for agitation and behavioral problems. Objective:      /74   Pulse 60   Ht 5' 4" (1.626 m)   Wt 95.9 kg (211 lb 6.4 oz)   SpO2 99%   BMI 36.29 kg/m²          Physical Exam  Constitutional:       Appearance: Normal appearance. She is not ill-appearing. Cardiovascular:      Pulses: Normal pulses. Comments: Bilateral DP & PT pulses 2/4. CRT WNL. Pedal hair present. Feet warm and well perfused. Pulmonary:      Effort: Pulmonary effort is normal. No respiratory distress.    Musculoskeletal:         General: Tenderness (Left foot along dorsal-lateral midfoot, plantar 2nd and 3rd metatarsal heads/plantar plate) and deformity (Pes planus, mild HAV with distal 2nd HT) present. Normal range of motion. Comments: There is decreased ankle DF with knee extended and flexed indicating gastroc-soleal equinus. L WB exam exhibits collapse of MLA with too many toes sign, heel position valgus. Some pain with ROM left 2nd and 3rd MTPJs  Bilateral ankle, STJ, TNJ, TMTJ ROM WNL and without pain. LE muscle strength WNL. Skin:     General: Skin is warm. Capillary Refill: Capillary refill takes less than 2 seconds. Findings: No erythema or lesion. Neurological:      General: No focal deficit present. Mental Status: She is alert and oriented to person, place, and time. Sensory: No sensory deficit. Comments: Gross sensation intact. Denies N/T/B to B/L feet.     Psychiatric:         Mood and Affect: Mood normal.

## 2023-12-06 ENCOUNTER — EVALUATION (OUTPATIENT)
Dept: PHYSICAL THERAPY | Facility: HOME HEALTHCARE | Age: 68
End: 2023-12-06
Payer: COMMERCIAL

## 2023-12-06 DIAGNOSIS — M79.672 LEFT FOOT PAIN: ICD-10-CM

## 2023-12-06 DIAGNOSIS — M77.42 METATARSALGIA OF LEFT FOOT: ICD-10-CM

## 2023-12-06 DIAGNOSIS — M21.42 PES PLANUS OF LEFT FOOT: ICD-10-CM

## 2023-12-06 DIAGNOSIS — M20.12 HAV (HALLUX ABDUCTO VALGUS), LEFT: ICD-10-CM

## 2023-12-06 DIAGNOSIS — M19.072 ARTHRITIS OF LEFT FOOT: ICD-10-CM

## 2023-12-06 PROCEDURE — 97161 PT EVAL LOW COMPLEX 20 MIN: CPT | Performed by: PHYSICAL THERAPIST

## 2023-12-06 PROCEDURE — 97110 THERAPEUTIC EXERCISES: CPT | Performed by: PHYSICAL THERAPIST

## 2023-12-06 NOTE — PROGRESS NOTES
PT Evaluation     Today's date: 2023  Patient name: Sekou Guillaume  : 1955  MRN: [id number]  Referring provider: Carissa Floyd DPM  Dx:   Encounter Diagnosis     ICD-10-CM    1. Left foot pain  M79.672 Ambulatory referral to Physical Therapy      2. Pes planus of left foot  M21.42 Ambulatory referral to Physical Therapy      3. Arthritis of left foot  M19.072 Ambulatory referral to Physical Therapy      4. Hav (hallux abducto valgus), left  M20.12 Ambulatory referral to Physical Therapy      5. Metatarsalgia of left foot  M77.42 Ambulatory referral to Physical Therapy                     Assessment  Assessment details: Pt Nery Pickard is a 74 y.o. who presents to OPPT with acute L foot pain primarily due to pes planus. Pt remains with L ankle ROM WFL but with mild strength deficits. Pt with swelling in 2nd metatarsal but denies any TTP. Pt notes limited tolerance to prolonged standing/ ambulation with pain always worse towards end of day. Pt requesting to complete home program vs attend therapy due to co-pay. PT provided pt with detailed HEP and reviewed all activities with pt verbalizing understanding. DC from OPPT with consult only.    Impairments: abnormal gait, abnormal or restricted ROM, abnormal movement, activity intolerance, impaired balance, impaired physical strength, lacks appropriate home exercise program, pain with function and weight-bearing intolerance  Understanding of Dx/Px/POC: good   Prognosis: good    Goals  Consult only for HEP     Plan  Patient would benefit from: skilled physical therapy  Planned therapy interventions: neuromuscular re-education, therapeutic exercise, therapeutic activities, gait training, functional ROM exercises, flexibility, patient education and home exercise program  Frequency: 1x week  Duration in weeks: 1  Plan of Care beginning date: 2023  Plan of Care expiration date: 2023  Treatment plan discussed with: patient and referring physician        Subjective Evaluation    History of Present Illness  Mechanism of injury: Pt reporting that she has been having pain in the L foot for the past month with unknown origin. She saw podiatry and was given forefoot pads to wear but pt notes they were making her pain worse. She is wearing Orthotics (not CMO for her specifically) and wears good supportive shoes. Pt notes pain is always worse towards end of day. MD referral to OPPT for conservative management of s/s. Return to MD again in 2024. Quality of life: good    Patient Goals  Patient goals for therapy: decreased edema, decreased pain, increased motion, increased strength and independence with ADLs/IADLs    Pain  At best pain ratin  At worst pain ratin  Relieving factors: rest  Aggravating factors: standing and walking  Progression: no change    Social Support  Stairs in house: yes   Lives with: spouse      Diagnostic Tests  X-ray: normal  Treatments  Current treatment: physical therapy        Objective     Observations   Left Ankle/Foot   Positive for edema. Additional Observation Details  Edema and slight redness (+) at 2nd metatarsal     Tenderness     Additional Tenderness Details  Denies any TTP while in long sitting position - however pt reporting pain t/o the foot upon standing     Neurological Testing     Sensation     Ankle/Foot   Left Ankle/Foot   Intact: light touch    Right Ankle/Foot   Intact: light touch     Active Range of Motion   Left Ankle/Foot   Dorsiflexion (ke): 2 degrees   Plantar flexion: 55 degrees   Inversion: 35 degrees   Eversion: 5 degrees     Right Ankle/Foot   Normal active range of motion    Strength/Myotome Testing     Left Ankle/Foot   Dorsiflexion: 4  Plantar flexion: 4  Inversion: 4  Eversion: 4-    Right Ankle/Foot   Normal strength    Tests   Left Ankle/Foot   Positive for forefoot varus.      Ambulation     Ambulation: Level Surfaces   Ambulation without assistive device: independent    Ambulation: Stairs   Pattern: reciprocal  Pattern: reciprocal    Quality of Movement During Gait     Foot Alignment    Foot Alignment (Left): Positive flat foot. Access Code: [email or web address]  URL: [email or web address]  Date: 12/06/2023  Prepared by: Delilah Gomez    Exercises  - Ankle Dorsiflexion with Resistance  - 1 x daily - 7 x weekly - 3 sets - 10 reps  - Ankle Eversion with Resistance  - 1 x daily - 7 x weekly - 3 sets - 10 reps  - Ankle Inversion with Resistance  - 1 x daily - 7 x weekly - 3 sets - 10 reps  - Ankle and Toe Plantarflexion with Resistance  - 1 x daily - 7 x weekly - 3 sets - 10 reps  - Long Sitting Calf Stretch with Strap  - 1 x daily - 7 x weekly - 1 sets - 4 reps - 30 seconds hold  - Heel Toe Raises with Counter Support  - 1 x daily - 7 x weekly - 3 sets - 10 reps  - Ankle Inversion Eversion Towel Slide  - 1 x daily - 7 x weekly - 3 sets - 10 reps  - Towel Scrunches  - 1 x daily - 7 x weekly - 3 sets - 10 reps

## 2024-01-03 ENCOUNTER — HOSPITAL ENCOUNTER (OUTPATIENT)
Dept: MAMMOGRAPHY | Facility: HOSPITAL | Age: 69
Discharge: HOME | End: 2024-01-03
Payer: COMMERCIAL

## 2024-01-03 ENCOUNTER — VBI (OUTPATIENT)
Dept: ADMINISTRATIVE | Facility: OTHER | Age: 69
End: 2024-01-03

## 2024-01-03 VITALS — WEIGHT: 211 LBS | HEIGHT: 64 IN | BODY MASS INDEX: 36.02 KG/M2

## 2024-01-03 DIAGNOSIS — I10 ESSENTIAL HYPERTENSION: ICD-10-CM

## 2024-01-03 DIAGNOSIS — E78.5 DYSLIPIDEMIA: ICD-10-CM

## 2024-01-03 DIAGNOSIS — Z12.31 ENCOUNTER FOR SCREENING MAMMOGRAM FOR MALIGNANT NEOPLASM OF BREAST: ICD-10-CM

## 2024-01-03 PROCEDURE — 77063 BREAST TOMOSYNTHESIS BI: CPT

## 2024-01-03 PROCEDURE — 77067 SCR MAMMO BI INCL CAD: CPT

## 2024-01-03 RX ORDER — EZETIMIBE 10 MG/1
10 TABLET ORAL DAILY
Qty: 90 TABLET | Refills: 1 | Status: SHIPPED | OUTPATIENT
Start: 2024-01-03

## 2024-01-03 RX ORDER — AMLODIPINE BESYLATE 5 MG/1
5 TABLET ORAL DAILY
Qty: 90 TABLET | Refills: 1 | Status: SHIPPED | OUTPATIENT
Start: 2024-01-03

## 2024-01-31 ENCOUNTER — APPOINTMENT (OUTPATIENT)
Dept: LAB | Facility: HOSPITAL | Age: 69
End: 2024-01-31
Payer: COMMERCIAL

## 2024-01-31 DIAGNOSIS — E55.9 VITAMIN D DEFICIENCY: ICD-10-CM

## 2024-01-31 DIAGNOSIS — K90.9 MALABSORPTION SYNDROME: ICD-10-CM

## 2024-01-31 DIAGNOSIS — Z98.84 HISTORY OF ROUX-EN-Y GASTRIC BYPASS: ICD-10-CM

## 2024-01-31 DIAGNOSIS — E11.69 DIABETES MELLITUS TYPE 2 IN OBESE: ICD-10-CM

## 2024-01-31 DIAGNOSIS — E66.01 MORBID OBESITY (HCC): ICD-10-CM

## 2024-01-31 DIAGNOSIS — E66.9 DIABETES MELLITUS TYPE 2 IN OBESE: ICD-10-CM

## 2024-01-31 DIAGNOSIS — D50.9 IRON DEFICIENCY ANEMIA, UNSPECIFIED IRON DEFICIENCY ANEMIA TYPE: ICD-10-CM

## 2024-01-31 LAB
25(OH)D3 SERPL-MCNC: 83.5 NG/ML (ref 30–100)
ALBUMIN SERPL BCP-MCNC: 4.2 G/DL (ref 3.5–5)
ALP SERPL-CCNC: 75 U/L (ref 34–104)
ALT SERPL W P-5'-P-CCNC: 21 U/L (ref 7–52)
ANION GAP SERPL CALCULATED.3IONS-SCNC: 9 MMOL/L
AST SERPL W P-5'-P-CCNC: 18 U/L (ref 13–39)
BILIRUB SERPL-MCNC: 1.35 MG/DL (ref 0.2–1)
BUN SERPL-MCNC: 20 MG/DL (ref 5–25)
CALCIUM SERPL-MCNC: 9.7 MG/DL (ref 8.4–10.2)
CHLORIDE SERPL-SCNC: 102 MMOL/L (ref 96–108)
CHOLEST SERPL-MCNC: 201 MG/DL
CO2 SERPL-SCNC: 29 MMOL/L (ref 21–32)
CREAT SERPL-MCNC: 0.83 MG/DL (ref 0.6–1.3)
CREAT UR-MCNC: 153.4 MG/DL
ERYTHROCYTE [DISTWIDTH] IN BLOOD BY AUTOMATED COUNT: 12.8 % (ref 11.6–15.1)
FERRITIN SERPL-MCNC: 156 NG/ML (ref 11–307)
GFR SERPL CREATININE-BSD FRML MDRD: 73 ML/MIN/1.73SQ M
GLUCOSE P FAST SERPL-MCNC: 127 MG/DL (ref 65–99)
HCT VFR BLD AUTO: 42.5 % (ref 34.8–46.1)
HDLC SERPL-MCNC: 61 MG/DL
HGB BLD-MCNC: 13.8 G/DL (ref 11.5–15.4)
IRON SATN MFR SERPL: 38 % (ref 15–50)
IRON SERPL-MCNC: 107 UG/DL (ref 50–212)
LDLC SERPL CALC-MCNC: 114 MG/DL (ref 0–100)
MCH RBC QN AUTO: 27.9 PG (ref 26.8–34.3)
MCHC RBC AUTO-ENTMCNC: 32.5 G/DL (ref 31.4–37.4)
MCV RBC AUTO: 86 FL (ref 82–98)
MICROALBUMIN UR-MCNC: 11.7 MG/L
MICROALBUMIN/CREAT 24H UR: 8 MG/G CREATININE (ref 0–30)
PLATELET # BLD AUTO: 291 THOUSANDS/UL (ref 149–390)
PMV BLD AUTO: 9 FL (ref 8.9–12.7)
POTASSIUM SERPL-SCNC: 3.5 MMOL/L (ref 3.5–5.3)
PROT SERPL-MCNC: 7.3 G/DL (ref 6.4–8.4)
RBC # BLD AUTO: 4.94 MILLION/UL (ref 3.81–5.12)
SODIUM SERPL-SCNC: 140 MMOL/L (ref 135–147)
TIBC SERPL-MCNC: 278 UG/DL (ref 250–450)
TRIGL SERPL-MCNC: 132 MG/DL
TSH SERPL DL<=0.05 MIU/L-ACNC: 0.81 UIU/ML (ref 0.45–4.5)
UIBC SERPL-MCNC: 171 UG/DL (ref 155–355)
WBC # BLD AUTO: 10.23 THOUSAND/UL (ref 4.31–10.16)

## 2024-01-31 PROCEDURE — 82043 UR ALBUMIN QUANTITATIVE: CPT

## 2024-01-31 PROCEDURE — 82728 ASSAY OF FERRITIN: CPT

## 2024-01-31 PROCEDURE — 82306 VITAMIN D 25 HYDROXY: CPT

## 2024-01-31 PROCEDURE — 36415 COLL VENOUS BLD VENIPUNCTURE: CPT

## 2024-01-31 PROCEDURE — 85027 COMPLETE CBC AUTOMATED: CPT

## 2024-01-31 PROCEDURE — 83540 ASSAY OF IRON: CPT

## 2024-01-31 PROCEDURE — 80061 LIPID PANEL: CPT

## 2024-01-31 PROCEDURE — 84443 ASSAY THYROID STIM HORMONE: CPT

## 2024-01-31 PROCEDURE — 80053 COMPREHEN METABOLIC PANEL: CPT

## 2024-01-31 PROCEDURE — 83036 HEMOGLOBIN GLYCOSYLATED A1C: CPT

## 2024-01-31 PROCEDURE — 83550 IRON BINDING TEST: CPT

## 2024-01-31 PROCEDURE — 82570 ASSAY OF URINE CREATININE: CPT

## 2024-02-01 LAB
EST. AVERAGE GLUCOSE BLD GHB EST-MCNC: 131 MG/DL
HBA1C MFR BLD: 6.2 %

## 2024-02-05 ENCOUNTER — TELEPHONE (OUTPATIENT)
Dept: SURGICAL ONCOLOGY | Facility: CLINIC | Age: 69
End: 2024-02-05

## 2024-02-08 DIAGNOSIS — D50.9 IRON DEFICIENCY ANEMIA, UNSPECIFIED IRON DEFICIENCY ANEMIA TYPE: ICD-10-CM

## 2024-02-08 DIAGNOSIS — K90.9 MALABSORPTION SYNDROME: ICD-10-CM

## 2024-02-08 DIAGNOSIS — E66.01 MORBID OBESITY (HCC): Primary | ICD-10-CM

## 2024-02-08 DIAGNOSIS — Z98.84 HISTORY OF ROUX-EN-Y GASTRIC BYPASS: ICD-10-CM

## 2024-02-08 RX ORDER — SODIUM CHLORIDE 9 MG/ML
20 INJECTION, SOLUTION INTRAVENOUS ONCE
OUTPATIENT
Start: 2024-02-12

## 2024-02-09 ENCOUNTER — OFFICE VISIT (OUTPATIENT)
Dept: HEMATOLOGY ONCOLOGY | Facility: CLINIC | Age: 69
End: 2024-02-09
Payer: COMMERCIAL

## 2024-02-09 VITALS
HEART RATE: 75 BPM | BODY MASS INDEX: 36.02 KG/M2 | OXYGEN SATURATION: 96 % | SYSTOLIC BLOOD PRESSURE: 110 MMHG | DIASTOLIC BLOOD PRESSURE: 66 MMHG | HEIGHT: 64 IN | TEMPERATURE: 98.2 F | WEIGHT: 211 LBS

## 2024-02-09 DIAGNOSIS — K90.9 MALABSORPTION SYNDROME: Primary | ICD-10-CM

## 2024-02-09 DIAGNOSIS — D50.9 IRON DEFICIENCY ANEMIA, UNSPECIFIED IRON DEFICIENCY ANEMIA TYPE: ICD-10-CM

## 2024-02-09 DIAGNOSIS — Z98.84 HISTORY OF ROUX-EN-Y GASTRIC BYPASS: ICD-10-CM

## 2024-02-09 DIAGNOSIS — E53.8 VITAMIN B 12 DEFICIENCY: ICD-10-CM

## 2024-02-09 PROCEDURE — 99214 OFFICE O/P EST MOD 30 MIN: CPT | Performed by: NURSE PRACTITIONER

## 2024-02-09 NOTE — PROGRESS NOTES
[facility]M ONC [facility] [place]  [address] [facility] [id number] [phone]  Hematology Ambulatory Follow-Up  Lizabeth Santiago, 1955, [id number]  2/9/2024    Assessment/Plan:    1. Malabsorption syndrome  2. History of Hima-en-Y gastric bypass  3. Iron deficiency anemia, unspecified iron deficiency anemia type  4. Vitamin B 12 deficiency  Patient is female with a history of, hypertension, hypo-, type 2 diabetes, Hima-en-Y gastric bypass in 2013.  She has been following with us for management of iron deficiency.  In February 2023 she had body left and September 2023 she underwent bilateral brachioplasty, liposuction of the lateral and medial thighs.  She has undergone Feraheme 510 mg infusions every 84 days, next infusion anticipated for 2/12/2024.  Most recent iron panel shows a saturation of 38%, ferritin level 156.  CBC essentially stable WBC slightly elevated at 10.23.  Hemoglobin is 13.8, MCV 86.  Patient states she would like to discontinue iron infusions at this time.  Overall she feels well and is able to function without restriction.  She is willing to reinstitute if indicated.  Given the stability of labs I agree.  Discontinue the Feraheme for now, and continue to monitor labs every 6 months and see her in 1 year.  Patient is up-to-date on mammogram and colonoscopy.  She agrees with the plan.    - CBC; Standing  - Iron Panel (Includes Ferritin, Iron Sat%, Iron, and TIBC); Standing  - Vitamin B12; Future    Barrier(s) to care: None  The patient is able to self care.    Nimco CASTILLO  Medical Oncology/Hematology  [facility]    Interval history: clinically stable    Review of Systems   Constitutional:  Negative for activity change, appetite change, fatigue, fever and unexpected weight change.   Respiratory:  Negative for cough and shortness of breath.    Cardiovascular:  Negative for chest pain and leg swelling.    Gastrointestinal:  Negative for abdominal pain, constipation, diarrhea and nausea.   Endocrine: Negative for cold intolerance and heat intolerance.   Musculoskeletal:  Negative for arthralgias and myalgias.   Skin: Negative.    Neurological:  Negative for dizziness, weakness and headaches.   Hematological:  Negative for adenopathy. Does not bruise/bleed easily.     Past Medical History:   Diagnosis Date    Anemia     Anesthesia complication     Facial swelling    Diabetes mellitus (HCC)     Disease of thyroid gland     Hyperlipidemia     Hypertension     Hypothyroidism     Morbid obesity (HCC)     Pancreatitis 09/1997    Sleep apnea     no cpap machine used    Type 2 diabetes mellitus (HCC)     Vitamin D deficiency     Wears glasses      Past Surgical History:   Procedure Laterality Date    BREAST EXCISIONAL BIOPSY Left 07/2001    benign    CHOLECYSTECTOMY  04/2013    COLONOSCOPY      EGD      GASTRIC BYPASS  05/2013    JOINT REPLACEMENT      NJ EXCISION EXCESSIVE SKIN & SUBQ TISSUE ARM Bilateral 9/26/2023    Procedure: BRACHIOPLASTY;  Surgeon: Riley Celeste MD;  Location:  MAIN OR;  Service: Plastics    NJ EXCISION SKIN ABD INFRAUMBILICAL PANNICULECTOMY N/A 2/15/2023    Procedure: BODY-LIFT;  Surgeon: Riley Celeste MD;  Location:  MAIN OR;  Service: Plastics    NJ SUCTION ASSISTED LIPECTOMY LOWER EXTREMITY Bilateral 9/26/2023    Procedure: LIPOSUCTION OF LATERAL & MEDIAL THIGHS;  Surgeon: Riley Celeste MD;  Location:  MAIN OR;  Service: Plastics    TONSILLECTOMY      TOTAL KNEE ARTHROPLASTY Left 2013     Current Outpatient Medications:     amLODIPine (NORVASC) 5 mg tablet, TAKE 1 TABLET (5 MG TOTAL) BY MOUTH DAILY., Disp: 90 tablet, Rfl: 1    b complex vitamins capsule, Take 1 capsule by mouth daily, Disp: , Rfl:     Blood Glucose Monitoring Suppl (BA Insightace Blood Glucose Monitor) CARLOS, Use in the morning, Disp: 1 each, Rfl: 0    carvedilol (COREG) 6.25 mg tablet, TAKE 1 TABLET BY MOUTH TWICE A DAY,  "Disp: 180 tablet, Rfl: 1    ergocalciferol (VITAMIN D2) 50,000 units, Take 1 capsule (50,000 Units total) by mouth once a week, Disp: 12 capsule, Rfl: 2    ezetimibe (ZETIA) 10 mg tablet, TAKE 1 TABLET BY MOUTH EVERY DAY, Disp: 90 tablet, Rfl: 1    fluticasone (FLONASE) 50 mcg/act nasal spray, SPRAY 1 SPRAY INTO EACH NOSTRIL EVERY DAY (Patient taking differently: 1 spray into each nostril if needed for allergies), Disp: 48 mL, Rfl: 4    glucose blood test strip, Use 1 each daily Use as instructed, Disp: 100 strip, Rfl: 11    hydrochlorothiazide (HYDRODIURIL) 25 mg tablet, TAKE 1 TABLET (25 MG TOTAL) BY MOUTH DAILY., Disp: 90 tablet, Rfl: 1    Lancets MISC, Use daily, Disp: 100 each, Rfl: 11    levothyroxine 125 mcg tablet, Take 1 tablet (125 mcg total) by mouth daily, Disp: 90 tablet, Rfl: 1    metFORMIN (GLUCOPHAGE-XR) 500 mg 24 hr tablet, Take 4 tablets (2,000 mg total) by mouth daily with breakfast, Disp: 360 tablet, Rfl: 1    Multiple Vitamin (multivitamin) tablet, Take 1 tablet by mouth daily, Disp: , Rfl:     sitaGLIPtin (Januvia) 50 mg tablet, Take 1 tablet (50 mg total) by mouth daily, Disp: 90 tablet, Rfl: 1    Allergies   Allergen Reactions    Aspirin Angioedema    Codeine Angioedema    Nsaids Angioedema    Statins Other (See Comments) and Angioedema     Patient states allergic to statins    Wound Dressing Adhesive Rash     Objective:  /66 (BP Location: Left arm, Patient Position: Sitting, Cuff Size: Standard)   Pulse 75   Temp 98.2 °F (36.8 °C) (Temporal)   Ht 5' 4\" (1.626 m)   Wt 95.7 kg (211 lb)   SpO2 96%   BMI 36.22 kg/m²    Physical Exam  Vitals reviewed.   Constitutional:       Appearance: Normal appearance. She is well-developed.   HENT:      Head: Normocephalic and atraumatic.   Eyes:      Conjunctiva/sclera: Conjunctivae normal.      Pupils: Pupils are equal, round, and reactive to light.   Pulmonary:      Effort: Pulmonary effort is normal. No respiratory distress.   Musculoskeletal: "         General: Normal range of motion.      Cervical back: Normal range of motion.   Lymphadenopathy:      Cervical: No cervical adenopathy.   Skin:     General: Skin is dry.   Neurological:      Mental Status: She is alert and oriented to person, place, and time.   Psychiatric:         Behavior: Behavior normal.     Result Review  Labs:  Appointment on 01/31/2024   Component Date Value Ref Range Status    Vit D, 25-Hydroxy 01/31/2024 83.5  30.0 - 100.0 ng/mL Final    Vitamin D guidelines established by Clinical Guidelines Subcommittee  of the Endocrine Society Task Force, 2011    Deficiency <20ng/ml   Insufficiency 20-30ng/ml   Sufficient  ng/ml     Sodium 01/31/2024 140  135 - 147 mmol/L Final    Potassium 01/31/2024 3.5  3.5 - 5.3 mmol/L Final    Chloride 01/31/2024 102  96 - 108 mmol/L Final    CO2 01/31/2024 29  21 - 32 mmol/L Final    ANION GAP 01/31/2024 9  mmol/L Final    BUN 01/31/2024 20  5 - 25 mg/dL Final    Creatinine 01/31/2024 0.83  0.60 - 1.30 mg/dL Final    Standardized to IDMS reference method    Glucose, Fasting 01/31/2024 127 (H)  65 - 99 mg/dL Final    Calcium 01/31/2024 9.7  8.4 - 10.2 mg/dL Final    AST 01/31/2024 18  13 - 39 U/L Final    ALT 01/31/2024 21  7 - 52 U/L Final    Specimen collection should occur prior to Sulfasalazine administration due to the potential for falsely depressed results.     Alkaline Phosphatase 01/31/2024 75  34 - 104 U/L Final    Total Protein 01/31/2024 7.3  6.4 - 8.4 g/dL Final    Albumin 01/31/2024 4.2  3.5 - 5.0 g/dL Final    Total Bilirubin 01/31/2024 1.35 (H)  0.20 - 1.00 mg/dL Final    Use of this assay is not recommended for patients undergoing treatment with eltrombopag due to the potential for falsely elevated results.  N-acetyl-p-benzoquinone imine (metabolite of Acetaminophen) will generate erroneously low results in samples for patients that have taken an overdose of Acetaminophen.    eGFR 01/31/2024 73  ml/min/1.73sq m Final    Hemoglobin A1C  01/31/2024 6.2 (H)  Normal 4.0-5.6%; PreDiabetic 5.7-6.4%; Diabetic >=6.5%; Glycemic control for adults with diabetes <7.0% % Final    EAG 01/31/2024 131  mg/dl Final    TSH 3RD GENERATON 01/31/2024 0.815  0.450 - 4.500 uIU/mL Final    The recommended reference ranges for TSH during pregnancy are as follows:   First trimester 0.100 to 2.500 uIU/mL   Second trimester  0.200 to 3.000 uIU/mL   Third trimester 0.300 to 3.000 uIU/m    Note: Normal ranges may not apply to patients who are transgender, non-binary, or whose legal sex, sex at birth, and gender identity differ.  Adult TSH (3rd generation) reference range follows the recommended guidelines of the American Thyroid Association, January, 2020.    Cholesterol 01/31/2024 201 (H)  See Comment mg/dL Final    Cholesterol:         Pediatric <18 Years        Desirable          <170 mg/dL      Borderline High    170-199 mg/dL      High               >=200 mg/dL        Adult >=18 Years            Desirable         <200 mg/dL      Borderline High   200-239 mg/dL      High              >239 mg/dL      Triglycerides 01/31/2024 132  See Comment mg/dL Final    Triglyceride:     0-9Y            <75mg/dL     10Y-17Y         <90 mg/dL       >=18Y     Normal          <150 mg/dL     Borderline High 150-199 mg/dL     High            200-499 mg/dL        Very High       >499 mg/dL    Specimen collection should occur prior to Metamizole administration due to the potential for falsely depressed results.    HDL, Direct 01/31/2024 61  >=50 mg/dL Final    LDL Calculated 01/31/2024 114 (H)  0 - 100 mg/dL Final    LDL Cholesterol:     Optimal           <100 mg/dl     Near Optimal      100-129 mg/dl     Above Optimal       Borderline High 130-159 mg/dl       High            160-189 mg/dl       Very High       >189 mg/dl         This screening LDL is a calculated result.   It does not have the accuracy of the Direct Measured LDL in the monitoring of patients with hyperlipidemia and/or statin  therapy.   Direct Measure LDL ([id number]) must be ordered separately in these patients.    WBC 01/31/2024 10.23 (H)  4.31 - 10.16 Thousand/uL Final    RBC 01/31/2024 4.94  3.81 - 5.12 Million/uL Final    Hemoglobin 01/31/2024 13.8  11.5 - 15.4 g/dL Final    Hematocrit 01/31/2024 42.5  34.8 - 46.1 % Final    MCV 01/31/2024 86  82 - 98 fL Final    MCH 01/31/2024 27.9  26.8 - 34.3 pg Final    MCHC 01/31/2024 32.5  31.4 - 37.4 g/dL Final    RDW 01/31/2024 12.8  11.6 - 15.1 % Final    Platelets 01/31/2024 291  149 - 390 Thousands/uL Final    MPV 01/31/2024 9.0  8.9 - 12.7 fL Final    Creatinine, Ur 01/31/2024 153.4  Reference range not established. mg/dL Final    Albumin,U,Random 01/31/2024 11.7  <20.0 mg/L Final    Albumin Creat Ratio 01/31/2024 8  0 - 30 mg/g creatinine Final    Iron Saturation 01/31/2024 38  15 - 50 % Final    TIBC 01/31/2024 278  250 - 450 ug/dL Final    Iron 01/31/2024 107  50 - 212 ug/dL Final    Patients treated with metal-binding drugs (ie. Deferoxamine) may have depressed iron values.    UIBC 01/31/2024 171  155 - 355 ug/dL Final    Ferritin 01/31/2024 156  11 - 307 ng/mL Final     Please note:  This report has been generated by a voice recognition software system. Therefore there may be syntax, spelling, and/or grammatical errors. Please call if you have any questions.

## 2024-02-12 ENCOUNTER — HOSPITAL ENCOUNTER (OUTPATIENT)
Dept: INFUSION CENTER | Facility: HOSPITAL | Age: 69
Discharge: HOME | End: 2024-02-12
Attending: INTERNAL MEDICINE

## 2024-03-14 ENCOUNTER — RA CDI HCC (OUTPATIENT)
Dept: OTHER | Facility: HOSPITAL | Age: 69
End: 2024-03-14

## 2024-03-16 DIAGNOSIS — E66.9 DIABETES MELLITUS TYPE 2 IN OBESE: ICD-10-CM

## 2024-03-16 DIAGNOSIS — E11.69 DIABETES MELLITUS TYPE 2 IN OBESE: ICD-10-CM

## 2024-03-18 RX ORDER — SITAGLIPTIN 50 MG/1
50 TABLET, FILM COATED ORAL DAILY
Qty: 90 TABLET | Refills: 1 | Status: SHIPPED | OUTPATIENT
Start: 2024-03-18

## 2024-03-20 ENCOUNTER — TELEPHONE (OUTPATIENT)
Dept: ADMINISTRATIVE | Facility: OTHER | Age: 69
End: 2024-03-20

## 2024-03-20 NOTE — TELEPHONE ENCOUNTER
03/20/24 2:38 PM    Patient contacted (spoke with patient) to bring Advance Directive, POLST, or Living Will document to next scheduled pcp visit.    Thank you.  Myrna Milan  [facility]

## 2024-03-21 ENCOUNTER — OFFICE VISIT (OUTPATIENT)
Dept: FAMILY MEDICINE CLINIC | Facility: CLINIC | Age: 69
End: 2024-03-21
Payer: COMMERCIAL

## 2024-03-21 VITALS
BODY MASS INDEX: 36.02 KG/M2 | WEIGHT: 211 LBS | SYSTOLIC BLOOD PRESSURE: 126 MMHG | RESPIRATION RATE: 20 BRPM | HEIGHT: 64 IN | HEART RATE: 80 BPM | DIASTOLIC BLOOD PRESSURE: 84 MMHG | TEMPERATURE: 97 F

## 2024-03-21 DIAGNOSIS — E78.5 DYSLIPIDEMIA: ICD-10-CM

## 2024-03-21 DIAGNOSIS — E66.01 MORBID OBESITY (HCC): ICD-10-CM

## 2024-03-21 DIAGNOSIS — E03.9 ACQUIRED HYPOTHYROIDISM: ICD-10-CM

## 2024-03-21 DIAGNOSIS — E11.69 DIABETES MELLITUS TYPE 2 IN OBESE: Primary | ICD-10-CM

## 2024-03-21 DIAGNOSIS — H93.13 TINNITUS OF BOTH EARS: ICD-10-CM

## 2024-03-21 DIAGNOSIS — E55.9 VITAMIN D DEFICIENCY: ICD-10-CM

## 2024-03-21 DIAGNOSIS — I10 ESSENTIAL HYPERTENSION: ICD-10-CM

## 2024-03-21 DIAGNOSIS — E66.9 DIABETES MELLITUS TYPE 2 IN OBESE: Primary | ICD-10-CM

## 2024-03-21 DIAGNOSIS — Z98.84 HISTORY OF ROUX-EN-Y GASTRIC BYPASS: ICD-10-CM

## 2024-03-21 PROCEDURE — 99214 OFFICE O/P EST MOD 30 MIN: CPT | Performed by: FAMILY MEDICINE

## 2024-03-21 PROCEDURE — G2211 COMPLEX E/M VISIT ADD ON: HCPCS | Performed by: FAMILY MEDICINE

## 2024-03-21 RX ORDER — AMLODIPINE BESYLATE 5 MG/1
5 TABLET ORAL DAILY
Qty: 90 TABLET | Refills: 1 | Status: SHIPPED | OUTPATIENT
Start: 2024-03-21

## 2024-03-21 RX ORDER — METFORMIN HYDROCHLORIDE 500 MG/1
2000 TABLET, EXTENDED RELEASE ORAL
Qty: 360 TABLET | Refills: 1 | Status: SHIPPED | OUTPATIENT
Start: 2024-03-21

## 2024-03-21 RX ORDER — HYDROCHLOROTHIAZIDE 25 MG/1
25 TABLET ORAL DAILY
Qty: 90 TABLET | Refills: 1 | Status: SHIPPED | OUTPATIENT
Start: 2024-03-21

## 2024-03-21 RX ORDER — EZETIMIBE 10 MG/1
10 TABLET ORAL DAILY
Qty: 90 TABLET | Refills: 1 | Status: SHIPPED | OUTPATIENT
Start: 2024-03-21

## 2024-03-21 NOTE — PROGRESS NOTES
Assessment/Plan: Diabetes mellitus type 2 with hemoglobin A1c of 6.2 January 31    Dyslipidemia on Zetia 10 mg with a total cholesterol of 201 triglycerides of 132 HDL is 61 and LDL of 114 the patient is statin intolerant    Essential hypertension with a blood pressure controlled on the current regimen    Acquired hypothyroidism euthyroid on current replacement therapy    Anemia and iron studies have corrected the patient has a history of a Hima-en-Y gastric bypass    Tinnitus the patient was offered referral to ENT she defers at this time the patient was advised to try over-the-counter bioflavonoids    Problem List Items Addressed This Visit       Morbid obesity (HCC)    Diabetes mellitus type 2 in obese  - Primary    Relevant Medications    metFORMIN (GLUCOPHAGE-XR) 500 mg 24 hr tablet    Hypothyroidism    History of Hima-en-Y gastric bypass     Other Visit Diagnoses       Dyslipidemia        Relevant Medications    ezetimibe (ZETIA) 10 mg tablet    Essential hypertension        Relevant Medications    amLODIPine (NORVASC) 5 mg tablet    hydroCHLOROthiazide 25 mg tablet    Vitamin D deficiency                 Diagnoses and all orders for this visit:    Diabetes mellitus type 2 in obese   -     metFORMIN (GLUCOPHAGE-XR) 500 mg 24 hr tablet; Take 4 tablets (2,000 mg total) by mouth daily with breakfast    Dyslipidemia  -     ezetimibe (ZETIA) 10 mg tablet; Take 1 tablet (10 mg total) by mouth daily    Essential hypertension  -     amLODIPine (NORVASC) 5 mg tablet; Take 1 tablet (5 mg total) by mouth daily  -     hydroCHLOROthiazide 25 mg tablet; Take 1 tablet (25 mg total) by mouth daily    Acquired hypothyroidism    Vitamin D deficiency    History of Hima-en-Y gastric bypass    Morbid obesity (HCC)        No problem-specific Assessment & Plan notes found for this encounter.      PHQ-2/9 Depression Screening    Little interest or pleasure in doing things: 0 - not at all  Feeling down, depressed, or hopeless: 0 - not  at all  PHQ-2 Score: 0  PHQ-2 Interpretation: Negative depression screen          Body mass index is 36.22 kg/m².    BMI Counseling: Body mass index is 36.22 kg/m². The BMI     Subjective:      Patient ID: Lizabeth Santiago is a 68 y.o. female.    Patient presents for checkup on diabetes and anemia    Earache   Pertinent negatives include no abdominal pain, coughing, rash, sore throat or vomiting.       The following portions of the patient's history were reviewed and updated as appropriate:   She has a past medical history of Anemia, Anesthesia complication, Diabetes mellitus (HCC), Disease of thyroid gland, Hyperlipidemia, Hypertension, Hypothyroidism, Morbid obesity (HCC), Pancreatitis (09/1997), Sleep apnea, Type 2 diabetes mellitus (HCC), Vitamin D deficiency, and Wears glasses.,  does not have any pertinent problems on file.,   has a past surgical history that includes Gastric bypass (05/2013); Cholecystectomy (04/2013); Total knee arthroplasty (Left, 2013); Breast excisional biopsy (Left, 07/2001); Colonoscopy; EGD; Tonsillectomy; Joint replacement; pr excision skin abd infraumbilical panniculectomy (N/A, 2/15/2023); pr excision excessive skin & subq tissue arm (Bilateral, 9/26/2023); and pr suction assisted lipectomy lower extremity (Bilateral, 9/26/2023).,  family history includes Brain cancer in her mother; Breast cancer in her maternal aunt, maternal aunt, and mother; Cancer in her paternal aunt, paternal aunt, paternal aunt, paternal grandfather, and paternal grandmother; Heart attack in her father; Lung cancer in her mother and paternal aunt; No Known Problems in her daughter, maternal grandfather, maternal grandmother, sister, sister, sister, and sister; Stroke in her sister.,   reports that she has never smoked. She has never used smokeless tobacco. She reports current alcohol use. She reports that she does not use drugs.,  is allergic to aspirin, codeine, nsaids, statins, and wound dressing  adhesive..  Current Outpatient Medications   Medication Sig Dispense Refill    amLODIPine (NORVASC) 5 mg tablet Take 1 tablet (5 mg total) by mouth daily 90 tablet 1    ezetimibe (ZETIA) 10 mg tablet Take 1 tablet (10 mg total) by mouth daily 90 tablet 1    hydroCHLOROthiazide 25 mg tablet Take 1 tablet (25 mg total) by mouth daily 90 tablet 1    metFORMIN (GLUCOPHAGE-XR) 500 mg 24 hr tablet Take 4 tablets (2,000 mg total) by mouth daily with breakfast 360 tablet 1    b complex vitamins capsule Take 1 capsule by mouth daily      Blood Glucose Monitoring Suppl (evocatal Blood Glucose Monitor) CARLOS Use in the morning 1 each 0    carvedilol (COREG) 6.25 mg tablet TAKE 1 TABLET BY MOUTH TWICE A  tablet 1    ergocalciferol (VITAMIN D2) 50,000 units Take 1 capsule (50,000 Units total) by mouth once a week 12 capsule 2    fluticasone (FLONASE) 50 mcg/act nasal spray SPRAY 1 SPRAY INTO EACH NOSTRIL EVERY DAY (Patient not taking: Reported on 3/21/2024) 48 mL 4    glucose blood test strip Use 1 each daily Use as instructed 100 strip 11    Januvia 50 MG tablet TAKE 1 TABLET BY MOUTH EVERY DAY 90 tablet 1    Lancets MISC Use daily 100 each 11    levothyroxine 125 mcg tablet Take 1 tablet (125 mcg total) by mouth daily 90 tablet 1    Multiple Vitamin (multivitamin) tablet Take 1 tablet by mouth daily       No current facility-administered medications for this visit.       Review of Systems   Constitutional:  Negative for chills and fever.   HENT:  Positive for ear pain. Negative for sore throat.         Tinnitus   Eyes:  Negative for pain and visual disturbance.   Respiratory:  Negative for cough and shortness of breath.    Cardiovascular:  Negative for chest pain and palpitations.   Gastrointestinal:  Negative for abdominal pain and vomiting.   Genitourinary:  Negative for dysuria and hematuria.   Musculoskeletal:  Negative for arthralgias and back pain.   Skin:  Negative for color change and rash.   Neurological:   "Negative for seizures and syncope.   All other systems reviewed and are negative.        Objective:    /84   Pulse 80   Temp (!) 97 °F (36.1 °C)   Resp 20   Ht 5' 4\" (1.626 m)   Wt 95.7 kg (211 lb)   BMI 36.22 kg/m²   Body mass index is 36.22 kg/m².     Physical Exam  Constitutional:       Appearance: Normal appearance. She is well-developed.   HENT:      Head: Normocephalic and atraumatic.      Right Ear: Tympanic membrane, ear canal and external ear normal.      Left Ear: Tympanic membrane, ear canal and external ear normal.      Nose: Nose normal.      Mouth/Throat:      Mouth: Mucous membranes are moist.      Pharynx: Oropharynx is clear.   Eyes:      Extraocular Movements: Extraocular movements intact.      Conjunctiva/sclera: Conjunctivae normal.      Pupils: Pupils are equal, round, and reactive to light.   Cardiovascular:      Rate and Rhythm: Normal rate and regular rhythm.      Pulses: Normal pulses.      Heart sounds: Normal heart sounds.   Pulmonary:      Effort: Pulmonary effort is normal.      Breath sounds: Normal breath sounds.   Abdominal:      General: Abdomen is flat. Bowel sounds are normal.      Palpations: Abdomen is soft.      Tenderness: There is no abdominal tenderness.   Musculoskeletal:         General: Normal range of motion.      Cervical back: Normal range of motion and neck supple.   Skin:     General: Skin is warm and dry.      Capillary Refill: Capillary refill takes less than 2 seconds.   Neurological:      General: No focal deficit present.      Mental Status: She is alert and oriented to person, place, and time.   Psychiatric:         Mood and Affect: Mood normal.         Behavior: Behavior normal.         Thought Content: Thought content normal.         Judgment: Judgment normal.           "

## 2024-04-07 DIAGNOSIS — I10 ESSENTIAL HYPERTENSION: ICD-10-CM

## 2024-04-09 RX ORDER — CARVEDILOL 6.25 MG/1
6.25 TABLET ORAL 2 TIMES DAILY
Qty: 180 TABLET | Refills: 1 | Status: SHIPPED | OUTPATIENT
Start: 2024-04-09

## 2024-04-26 LAB
LEFT EYE DIABETIC RETINOPATHY: NORMAL
RIGHT EYE DIABETIC RETINOPATHY: NORMAL

## 2024-05-09 ENCOUNTER — TELEPHONE (OUTPATIENT)
Dept: ADMINISTRATIVE | Facility: OTHER | Age: 69
End: 2024-05-09

## 2024-05-09 NOTE — LETTER
Diabetic Eye Exam Form    Date Requested: 24  Patient: Lizabeth Santiago  Patient : 1955   Referring Provider: Amado Tolentino,       DIABETIC Eye Exam Date _______________________________      Type of Exam MUST be documented for Diabetic Eye Exams. Please CHECK ONE.     Retinal Exam       Dilated Retinal Exam       OCT       Optomap-Iris Exam      Fundus Photography       Left Eye - Please check Retinopathy or No Retinopathy        Exam did show retinopathy    Exam did not show retinopathy       Right Eye - Please check Retinopathy or No Retinopathy       Exam did show retinopathy    Exam did not show retinopathy       Comments __________________________________________________________    Practice Providing Exam ______________________________________________    Exam Performed By (print name) _______________________________________      Provider Signature ___________________________________________________      These reports are needed for  compliance.  Please fax this completed form and a copy of the Diabetic Eye Exam report to our office located at [address] [place] [address] as soon as possible via Fax [phone] attention Kerissa: Phone [phone]  We thank you for your assistance in treating our mutual patient.

## 2024-05-09 NOTE — TELEPHONE ENCOUNTER
----- Message from Teri Polischak sent at 5/9/2024 10:38 AM EDT -----  Regarding: Care Gaps Request  05/09/24 10:38 AM    Hello, our patient Lizabeth Santiago has had Diabetic Eye Exam completed/performed. Please assist in updating the patient chart by making an External outreach to Maeve Barrett. The date of service is more recent than 2022.    Thank you,  Teri Polischak  [facility]

## 2024-05-09 NOTE — TELEPHONE ENCOUNTER
Upon review of the In Basket request and the patient's chart, initial outreach has been made via fax to facility. Please see Contacts section for details.     Thank you  CAROLYN BHANDARI

## 2024-05-10 NOTE — TELEPHONE ENCOUNTER
Upon review of the In Basket request we were able to locate, review, and update the patient chart as requested for Diabetic Eye Exam.    Any additional questions or concerns should be emailed to the Practice Liaisons via the appropriate education email address, please do not reply via [employer].    Thank you  CAROLYN BHANDARI

## 2024-06-05 ENCOUNTER — CONSULT (OUTPATIENT)
Dept: FAMILY MEDICINE CLINIC | Facility: CLINIC | Age: 69
End: 2024-06-05
Payer: COMMERCIAL

## 2024-06-05 VITALS
SYSTOLIC BLOOD PRESSURE: 130 MMHG | DIASTOLIC BLOOD PRESSURE: 82 MMHG | WEIGHT: 220 LBS | BODY MASS INDEX: 37.56 KG/M2 | HEART RATE: 84 BPM | HEIGHT: 64 IN | RESPIRATION RATE: 20 BRPM | TEMPERATURE: 97.9 F

## 2024-06-05 DIAGNOSIS — Z98.84 HISTORY OF ROUX-EN-Y GASTRIC BYPASS: ICD-10-CM

## 2024-06-05 DIAGNOSIS — E11.69 TYPE 2 DIABETES MELLITUS WITH OBESITY  (HCC): ICD-10-CM

## 2024-06-05 DIAGNOSIS — I10 ESSENTIAL HYPERTENSION: ICD-10-CM

## 2024-06-05 DIAGNOSIS — E66.9 TYPE 2 DIABETES MELLITUS WITH OBESITY  (HCC): ICD-10-CM

## 2024-06-05 DIAGNOSIS — Z01.818 PREOPERATIVE CLEARANCE: Primary | ICD-10-CM

## 2024-06-05 DIAGNOSIS — E78.5 DYSLIPIDEMIA: ICD-10-CM

## 2024-06-05 DIAGNOSIS — E03.9 ACQUIRED HYPOTHYROIDISM: ICD-10-CM

## 2024-06-05 PROCEDURE — 99214 OFFICE O/P EST MOD 30 MIN: CPT | Performed by: FAMILY MEDICINE

## 2024-06-05 PROCEDURE — G2211 COMPLEX E/M VISIT ADD ON: HCPCS | Performed by: FAMILY MEDICINE

## 2024-06-05 NOTE — PROGRESS NOTES
Assessment/Plan: The patient is cleared for cataract surgery    Hypertensive cardiovascular disease with a blood pressure controlled on the current regimen    Diabetes mellitus well-controlled with a hemoglobin A1c of 6.2 January 31, 2024    Hypothyroidism euthyroid on the current replacement    Status post Hima-en-Y gastric bypass    Dyslipidemia on Zetia 10 mg with a total cholesterol of 2 1 triglycerides of 132 HDL of 61 and LDL of 114.  The patient is statin intolerant    Problem List Items Addressed This Visit     Type 2 diabetes mellitus with obesity  (HCC)    Relevant Orders    Albumin / creatinine urine ratio    Comprehensive metabolic panel    Hemoglobin A1C    Comprehensive metabolic panel    CBC and differential    Lipid Panel with Direct LDL reflex    TSH, 3rd generation with Free T4 reflex    Hypothyroidism    Relevant Orders    TSH, 3rd generation with Free T4 reflex    History of Hima-en-Y gastric bypass   Other Visit Diagnoses     Preoperative clearance    -  Primary    Essential hypertension        Dyslipidemia        Relevant Orders    Lipid Panel with Direct LDL reflex             Diagnoses and all orders for this visit:    Preoperative clearance    Essential hypertension    Type 2 diabetes mellitus with obesity  (HCC)  -     Albumin / creatinine urine ratio; Future  -     Comprehensive metabolic panel; Future  -     Hemoglobin A1C; Future  -     Comprehensive metabolic panel; Future  -     CBC and differential; Future  -     Lipid Panel with Direct LDL reflex; Future  -     TSH, 3rd generation with Free T4 reflex; Future    Acquired hypothyroidism  -     TSH, 3rd generation with Free T4 reflex; Future    History of Hima-en-Y gastric bypass    Dyslipidemia  -     Lipid Panel with Direct LDL reflex; Future        No problem-specific Assessment & Plan notes found for this encounter.      PHQ-2/9 Depression Screening            Body mass index is 37.76 kg/m².    BMI Counseling: Body mass index is  37.76 kg/m². The BMI   Subjective:      Patient ID: Lizabeth Santiago is a 68 y.o. female.    Patient presents for clearance for cataract surgery        The following portions of the patient's history were reviewed and updated as appropriate:   She has a past medical history of Anemia, Anesthesia complication, Diabetes mellitus (HCC), Disease of thyroid gland, Hyperlipidemia, Hypertension, Hypothyroidism, Morbid obesity (HCC), Pancreatitis (09/1997), Sleep apnea, Type 2 diabetes mellitus (HCC), Vitamin D deficiency, and Wears glasses.,  does not have any pertinent problems on file.,   has a past surgical history that includes Gastric bypass (05/2013); Cholecystectomy (04/2013); Total knee arthroplasty (Left, 2013); Breast excisional biopsy (Left, 07/2001); Colonoscopy; EGD; Tonsillectomy; Joint replacement; pr excision skin abd infraumbilical panniculectomy (N/A, 2/15/2023); pr excision excessive skin & subq tissue arm (Bilateral, 9/26/2023); and pr suction assisted lipectomy lower extremity (Bilateral, 9/26/2023).,  family history includes Brain cancer in her mother; Breast cancer in her maternal aunt, maternal aunt, and mother; Cancer in her paternal aunt, paternal aunt, paternal aunt, paternal grandfather, and paternal grandmother; Heart attack in her father; Lung cancer in her mother and paternal aunt; No Known Problems in her daughter, maternal grandfather, maternal grandmother, sister, sister, sister, and sister; Stroke in her sister.,   reports that she has never smoked. She has never used smokeless tobacco. She reports current alcohol use. She reports that she does not use drugs.,  is allergic to aspirin, codeine, nsaids, statins, and wound dressing adhesive..  Current Outpatient Medications   Medication Sig Dispense Refill   • amLODIPine (NORVASC) 5 mg tablet Take 1 tablet (5 mg total) by mouth daily 90 tablet 1   • b complex vitamins capsule Take 1 capsule by mouth daily     • Blood Glucose Monitoring  "Suppl ([employer] Blood Glucose Monitor) CARLOS Use in the morning 1 each 0   • carvedilol (COREG) 6.25 mg tablet TAKE 1 TABLET BY MOUTH TWICE A  tablet 1   • ergocalciferol (VITAMIN D2) 50,000 units Take 1 capsule (50,000 Units total) by mouth once a week 12 capsule 2   • ezetimibe (ZETIA) 10 mg tablet Take 1 tablet (10 mg total) by mouth daily 90 tablet 1   • fluticasone (FLONASE) 50 mcg/act nasal spray SPRAY 1 SPRAY INTO EACH NOSTRIL EVERY DAY (Patient not taking: Reported on 3/21/2024) 48 mL 4   • glucose blood test strip Use 1 each daily Use as instructed 100 strip 11   • hydroCHLOROthiazide 25 mg tablet Take 1 tablet (25 mg total) by mouth daily 90 tablet 1   • Januvia 50 MG tablet TAKE 1 TABLET BY MOUTH EVERY DAY 90 tablet 1   • Lancets MISC Use daily 100 each 11   • levothyroxine 125 mcg tablet Take 1 tablet (125 mcg total) by mouth daily 90 tablet 1   • metFORMIN (GLUCOPHAGE-XR) 500 mg 24 hr tablet Take 4 tablets (2,000 mg total) by mouth daily with breakfast 360 tablet 1   • Multiple Vitamin (multivitamin) tablet Take 1 tablet by mouth daily       No current facility-administered medications for this visit.       Review of Systems   Constitutional:  Negative for chills and fever.   HENT:  Negative for ear pain and sore throat.    Eyes:  Negative for pain and visual disturbance.   Respiratory:  Negative for cough and shortness of breath.    Cardiovascular:  Negative for chest pain and palpitations.   Gastrointestinal:  Negative for abdominal pain and vomiting.   Genitourinary:  Negative for dysuria and hematuria.   Musculoskeletal:  Negative for arthralgias and back pain.   Skin:  Negative for color change and rash.   Neurological:  Negative for seizures and syncope.   All other systems reviewed and are negative.        Objective:    /82   Pulse 84   Temp 97.9 °F (36.6 °C)   Resp 20   Ht 5' 4\" (1.626 m)   Wt 99.8 kg (220 lb)   BMI 37.76 kg/m²   Body mass index is 37.76 kg/m².     Physical " Exam  Constitutional:       Appearance: Normal appearance. She is well-developed.   HENT:      Head: Normocephalic and atraumatic.      Right Ear: Tympanic membrane, ear canal and external ear normal.      Left Ear: Tympanic membrane, ear canal and external ear normal.      Nose: Nose normal.      Mouth/Throat:      Mouth: Mucous membranes are moist.      Pharynx: Oropharynx is clear.   Eyes:      Extraocular Movements: Extraocular movements intact.      Conjunctiva/sclera: Conjunctivae normal.      Pupils: Pupils are equal, round, and reactive to light.   Cardiovascular:      Rate and Rhythm: Normal rate and regular rhythm.      Pulses: Normal pulses.      Heart sounds: Normal heart sounds.   Pulmonary:      Effort: Pulmonary effort is normal.      Breath sounds: Normal breath sounds.   Abdominal:      General: Abdomen is flat. Bowel sounds are normal.      Palpations: Abdomen is soft.      Tenderness: There is no abdominal tenderness.   Musculoskeletal:         General: Normal range of motion.      Cervical back: Normal range of motion and neck supple.   Skin:     General: Skin is warm and dry.      Capillary Refill: Capillary refill takes less than 2 seconds.   Neurological:      General: No focal deficit present.      Mental Status: She is alert and oriented to person, place, and time.   Psychiatric:         Mood and Affect: Mood normal.         Behavior: Behavior normal.         Thought Content: Thought content normal.         Judgment: Judgment normal.

## 2024-06-08 ENCOUNTER — OFFICE VISIT (OUTPATIENT)
Dept: URGENT CARE | Facility: MEDICAL CENTER | Age: 69
End: 2024-06-08
Payer: COMMERCIAL

## 2024-06-08 ENCOUNTER — APPOINTMENT (OUTPATIENT)
Dept: RADIOLOGY | Facility: MEDICAL CENTER | Age: 69
End: 2024-06-08
Payer: COMMERCIAL

## 2024-06-08 VITALS
SYSTOLIC BLOOD PRESSURE: 114 MMHG | HEART RATE: 79 BPM | DIASTOLIC BLOOD PRESSURE: 62 MMHG | RESPIRATION RATE: 22 BRPM | OXYGEN SATURATION: 98 % | TEMPERATURE: 97.9 F

## 2024-06-08 DIAGNOSIS — S52.501A CLOSED FRACTURE OF DISTAL END OF RIGHT RADIUS, UNSPECIFIED FRACTURE MORPHOLOGY, INITIAL ENCOUNTER: Primary | ICD-10-CM

## 2024-06-08 DIAGNOSIS — M25.431 WRIST SWELLING, RIGHT: ICD-10-CM

## 2024-06-08 PROCEDURE — S9083 URGENT CARE CENTER GLOBAL: HCPCS

## 2024-06-08 PROCEDURE — 29125 APPL SHORT ARM SPLINT STATIC: CPT

## 2024-06-08 PROCEDURE — 73110 X-RAY EXAM OF WRIST: CPT

## 2024-06-08 PROCEDURE — 99214 OFFICE O/P EST MOD 30 MIN: CPT

## 2024-06-08 NOTE — PROGRESS NOTES
[facility]        NAME: Lizabeth Santiago is a 68 y.o. female  : 1955    MRN: [id number]  DATE: 2024  TIME: 6:45 PM    Assessment and Plan   Closed fracture of distal end of right radius, unspecified fracture morphology, initial encounter [S52.501A]  1. Closed fracture of distal end of right radius, unspecified fracture morphology, initial encounter  Orthopedic injury treatment      2. Wrist swelling, right  XR wrist 3+ vw right    Orthopedic injury treatment            Patient Instructions       Follow up with PCP in 3-5 days.  Proceed to  ER if symptoms worsen.    If tests are performed, our office will contact you with results only if changes need to made to the care plan discussed with you at the visit. [facility]    Chief Complaint     Chief Complaint   Patient presents with   • Wrist Pain     Right wrist pain and swelling. Fell on her hands and knees around 1200. Did ice area and took tylenol         History of Present Illness       Earlier today around noon she was planting flowers, bent over and tripped over pavers falling forward and bracing her fall with outstretched hand. Pain and swelling noted in the right wrist. She is right hand dominant. ROM slightly limited with flex/extension of right wrist. Able to supinate/pronate. She does not take blood thinners. She denies striking her head.     Provider Radiology Interpretation (preliminary)   Final results will be as per official Radiology Report when available:   Questionable fracture of the radial styloid process. Patient placed in wrist with TO commercially provided splint. **Ortho referral IF Official xray is positive -- will call patient and then will place ortho referral. If Negative directed to continue to wear splint for several days and follow up with PCP. If she would continue with pain then she would benefit from ortho referral even with a negative xray.         Review of  Systems   Review of Systems   Constitutional:  Negative for chills, fatigue and fever.   Musculoskeletal:  Positive for arthralgias and joint swelling.   Skin:  Negative for color change and wound.   Neurological:  Negative for dizziness, light-headedness and headaches.         Current Medications       Current Outpatient Medications:   •  amLODIPine (NORVASC) 5 mg tablet, Take 1 tablet (5 mg total) by mouth daily, Disp: 90 tablet, Rfl: 1  •  b complex vitamins capsule, Take 1 capsule by mouth daily, Disp: , Rfl:   •  Blood Glucose Monitoring Suppl (OpenTable Blood Glucose Monitor) CARLOS, Use in the morning, Disp: 1 each, Rfl: 0  •  carvedilol (COREG) 6.25 mg tablet, TAKE 1 TABLET BY MOUTH TWICE A DAY, Disp: 180 tablet, Rfl: 1  •  ergocalciferol (VITAMIN D2) 50,000 units, Take 1 capsule (50,000 Units total) by mouth once a week, Disp: 12 capsule, Rfl: 2  •  ezetimibe (ZETIA) 10 mg tablet, Take 1 tablet (10 mg total) by mouth daily, Disp: 90 tablet, Rfl: 1  •  glucose blood test strip, Use 1 each daily Use as instructed, Disp: 100 strip, Rfl: 11  •  hydroCHLOROthiazide 25 mg tablet, Take 1 tablet (25 mg total) by mouth daily, Disp: 90 tablet, Rfl: 1  •  Januvia 50 MG tablet, TAKE 1 TABLET BY MOUTH EVERY DAY, Disp: 90 tablet, Rfl: 1  •  Lancets MISC, Use daily, Disp: 100 each, Rfl: 11  •  levothyroxine 125 mcg tablet, Take 1 tablet (125 mcg total) by mouth daily, Disp: 90 tablet, Rfl: 1  •  metFORMIN (GLUCOPHAGE-XR) 500 mg 24 hr tablet, Take 4 tablets (2,000 mg total) by mouth daily with breakfast, Disp: 360 tablet, Rfl: 1  •  Multiple Vitamin (multivitamin) tablet, Take 1 tablet by mouth daily, Disp: , Rfl:   •  fluticasone (FLONASE) 50 mcg/act nasal spray, SPRAY 1 SPRAY INTO EACH NOSTRIL EVERY DAY (Patient not taking: Reported on 3/21/2024), Disp: 48 mL, Rfl: 4    Current Allergies     Allergies as of 06/08/2024 - Reviewed 06/08/2024   Allergen Reaction Noted   • Aspirin Angioedema 09/17/2020   • Codeine Angioedema  11/03/2022   • Nsaids Angioedema 09/17/2020   • Statins Other (See Comments) and Angioedema 11/01/2022   • Wound dressing adhesive Rash 11/03/2022            The following portions of the patient's history were reviewed and updated as appropriate: allergies, current medications, past family history, past medical history, past social history, past surgical history and problem list.     Past Medical History:   Diagnosis Date   • Anemia    • Anesthesia complication     Facial swelling   • Diabetes mellitus (HCC)    • Disease of thyroid gland    • Hyperlipidemia    • Hypertension    • Hypothyroidism    • Morbid obesity (HCC)    • Pancreatitis 09/1997   • Sleep apnea     no cpap machine used   • Type 2 diabetes mellitus (HCC)    • Vitamin D deficiency    • Wears glasses        Past Surgical History:   Procedure Laterality Date   • BREAST EXCISIONAL BIOPSY Left 07/2001    benign   • CHOLECYSTECTOMY  04/2013   • COLONOSCOPY     • EGD     • GASTRIC BYPASS  05/2013   • JOINT REPLACEMENT     • VA EXCISION EXCESSIVE SKIN & SUBQ TISSUE ARM Bilateral 9/26/2023    Procedure: BRACHIOPLASTY;  Surgeon: Riley Celeste MD;  Location:  MAIN OR;  Service: Plastics   • VA EXCISION SKIN ABD INFRAUMBILICAL PANNICULECTOMY N/A 2/15/2023    Procedure: BODY-LIFT;  Surgeon: Riley Celeste MD;  Location:  MAIN OR;  Service: Plastics   • VA SUCTION ASSISTED LIPECTOMY LOWER EXTREMITY Bilateral 9/26/2023    Procedure: LIPOSUCTION OF LATERAL & MEDIAL THIGHS;  Surgeon: Riley Celeste MD;  Location:  MAIN OR;  Service: Plastics   • TONSILLECTOMY     • TOTAL KNEE ARTHROPLASTY Left 2013       Family History   Problem Relation Age of Onset   • Breast cancer Mother    • Lung cancer Mother    • Brain cancer Mother    • Heart attack Father    • Stroke Sister    • No Known Problems Sister    • No Known Problems Sister    • No Known Problems Sister    • No Known Problems Sister    • No Known Problems Daughter    • No Known Problems Maternal  "Grandmother    • No Known Problems Maternal Grandfather    • Cancer Paternal Grandmother         unknown origin    • Cancer Paternal Grandfather         unknown origin    • Breast cancer Maternal Aunt    • Breast cancer Maternal Aunt    • Cancer Paternal Aunt         unknown origin   • Cancer Paternal Aunt         unknown origin   • Cancer Paternal Aunt         unknown origin   • Lung cancer Paternal Aunt    • BRCA2 Positive Neg Hx    • BRCA2 Negative Neg Hx    • BRCA1 Positive Neg Hx    • Breast cancer additional onset Neg Hx    • BRCA1 Negative Neg Hx    • BRCA 1/2 Neg Hx    • Endometrial cancer Neg Hx    • Ovarian cancer Neg Hx    • Colon cancer Neg Hx          Medications have been verified.        Objective   /62   Pulse 79   Temp 97.9 °F (36.6 °C)   Resp 22   SpO2 98%        Physical Exam     Physical Exam  Vitals and nursing note reviewed.   Constitutional:       Appearance: Normal appearance. She is obese.   HENT:      Head: Normocephalic and atraumatic.   Cardiovascular:      Rate and Rhythm: Normal rate.   Musculoskeletal:      Right wrist: Swelling, tenderness and bony tenderness present. No deformity or snuff box tenderness. Decreased range of motion. Normal pulse.        Hands:       Comments: Patient reports tenderness over the lateral aspects of the wrist as well as tenderness over the distal radius.    Neurological:      Mental Status: She is alert.     Orthopedic injury treatment    Date/Time: 6/8/2024 6:39 PM    Performed by: JONATHAN Lopez  Authorized by: JONATHAN Lopez    Patient Location:  [facility]  [place] Protocol:  Procedure performed by: (Ethel BENITES)  Consent: Verbal consent obtained.  Consent given by: patient  Time out: Immediately prior to procedure a \"time out\" was called to verify the correct patient, procedure, equipment, support staff and site/side marked as required.  Patient understanding: patient states understanding of the procedure being " performed  Patient consent: the patient's understanding of the procedure matches consent given  Procedure consent: procedure consent matches procedure scheduled  Patient identity confirmed: verbally with patient    Injury location:  Forearm  Location details:  Right forearm  Injury type:  Fracture  Fracture type: distal radius    Fracture type: distal radius    Neurovascular status: Neurovascularly intact    Distal perfusion: normal    Neurological function: normal    Range of motion comment:  Slightly limited  Immobilization:  Splint  Splint type:  Wrist (Wrist with TO)  Distal perfusion: normal    Neurological function: normal    Range of motion comment:  Limited due to splinting  Patient tolerance:  Patient tolerated the procedure well with no immediate complications

## 2024-06-08 NOTE — PATIENT INSTRUCTIONS
You may take over the counter Tylenol (Acetaminophen) and/or Motrin (Ibuprofen) as needed, as directed on packaging.     Please follow up with your primary provider in the next several days. Should you have any worsening of symptoms, or lack of improvement please be re-evaluated. If needed for significant concerns, consider 911 or ER evaluation.     Your xray was read by the provider you saw today in the office as a preliminary result. Your xray will be reviewed and an official reading will be provided by a Radiologist. If you have access to My Chart you can see these results there. Also if there is any significant findings you will be contacted with those results.

## 2024-06-10 DIAGNOSIS — E03.9 ACQUIRED HYPOTHYROIDISM: ICD-10-CM

## 2024-06-10 RX ORDER — LEVOTHYROXINE SODIUM 0.12 MG/1
125 TABLET ORAL DAILY
Qty: 90 TABLET | Refills: 1 | Status: SHIPPED | OUTPATIENT
Start: 2024-06-10

## 2024-09-08 DIAGNOSIS — E66.9 TYPE 2 DIABETES MELLITUS WITH OBESITY  (HCC): ICD-10-CM

## 2024-09-08 DIAGNOSIS — E11.69 TYPE 2 DIABETES MELLITUS WITH OBESITY  (HCC): ICD-10-CM

## 2024-09-09 RX ORDER — SITAGLIPTIN 50 MG/1
50 TABLET, FILM COATED ORAL DAILY
Qty: 30 TABLET | Refills: 0 | Status: SHIPPED | OUTPATIENT
Start: 2024-09-09 | End: 2024-09-19 | Stop reason: SDUPTHER

## 2024-09-12 ENCOUNTER — RA CDI HCC (OUTPATIENT)
Dept: OTHER | Facility: HOSPITAL | Age: 69
End: 2024-09-12

## 2024-09-12 DIAGNOSIS — I10 ESSENTIAL HYPERTENSION: ICD-10-CM

## 2024-09-12 DIAGNOSIS — E78.5 DYSLIPIDEMIA: ICD-10-CM

## 2024-09-12 RX ORDER — AMLODIPINE BESYLATE 5 MG/1
5 TABLET ORAL DAILY
Qty: 90 TABLET | Refills: 1 | Status: SHIPPED | OUTPATIENT
Start: 2024-09-12

## 2024-09-12 RX ORDER — HYDROCHLOROTHIAZIDE 25 MG/1
25 TABLET ORAL DAILY
Qty: 90 TABLET | Refills: 1 | Status: SHIPPED | OUTPATIENT
Start: 2024-09-12

## 2024-09-12 RX ORDER — EZETIMIBE 10 MG/1
10 TABLET ORAL DAILY
Qty: 90 TABLET | Refills: 1 | Status: SHIPPED | OUTPATIENT
Start: 2024-09-12

## 2024-09-17 ENCOUNTER — APPOINTMENT (OUTPATIENT)
Dept: LAB | Facility: HOSPITAL | Age: 69
End: 2024-09-17
Payer: COMMERCIAL

## 2024-09-17 DIAGNOSIS — E53.8 VITAMIN B 12 DEFICIENCY: ICD-10-CM

## 2024-09-17 DIAGNOSIS — E03.9 ACQUIRED HYPOTHYROIDISM: ICD-10-CM

## 2024-09-17 DIAGNOSIS — D50.9 IRON DEFICIENCY ANEMIA, UNSPECIFIED IRON DEFICIENCY ANEMIA TYPE: ICD-10-CM

## 2024-09-17 DIAGNOSIS — Z98.84 HISTORY OF ROUX-EN-Y GASTRIC BYPASS: ICD-10-CM

## 2024-09-17 DIAGNOSIS — E78.5 DYSLIPIDEMIA: ICD-10-CM

## 2024-09-17 DIAGNOSIS — E11.69 TYPE 2 DIABETES MELLITUS WITH OBESITY  (HCC): ICD-10-CM

## 2024-09-17 DIAGNOSIS — K90.9 MALABSORPTION SYNDROME: ICD-10-CM

## 2024-09-17 DIAGNOSIS — E66.9 TYPE 2 DIABETES MELLITUS WITH OBESITY  (HCC): ICD-10-CM

## 2024-09-17 LAB
ALBUMIN SERPL BCG-MCNC: 4 G/DL (ref 3.5–5)
ALP SERPL-CCNC: 67 U/L (ref 34–104)
ALT SERPL W P-5'-P-CCNC: 17 U/L (ref 7–52)
ANION GAP SERPL CALCULATED.3IONS-SCNC: 9 MMOL/L (ref 4–13)
AST SERPL W P-5'-P-CCNC: 14 U/L (ref 13–39)
BASOPHILS # BLD AUTO: 0.02 THOUSANDS/ΜL (ref 0–0.1)
BASOPHILS NFR BLD AUTO: 0 % (ref 0–1)
BILIRUB SERPL-MCNC: 1.06 MG/DL (ref 0.2–1)
BUN SERPL-MCNC: 18 MG/DL (ref 5–25)
CALCIUM SERPL-MCNC: 9.3 MG/DL (ref 8.4–10.2)
CHLORIDE SERPL-SCNC: 103 MMOL/L (ref 96–108)
CHOLEST SERPL-MCNC: 180 MG/DL
CO2 SERPL-SCNC: 29 MMOL/L (ref 21–32)
CREAT SERPL-MCNC: 0.86 MG/DL (ref 0.6–1.3)
CREAT UR-MCNC: 89.5 MG/DL
EOSINOPHIL # BLD AUTO: 0.28 THOUSAND/ΜL (ref 0–0.61)
EOSINOPHIL NFR BLD AUTO: 4 % (ref 0–6)
ERYTHROCYTE [DISTWIDTH] IN BLOOD BY AUTOMATED COUNT: 12.9 % (ref 11.6–15.1)
EST. AVERAGE GLUCOSE BLD GHB EST-MCNC: 137 MG/DL
FERRITIN SERPL-MCNC: 114 NG/ML (ref 11–307)
GFR SERPL CREATININE-BSD FRML MDRD: 70 ML/MIN/1.73SQ M
GLUCOSE P FAST SERPL-MCNC: 136 MG/DL (ref 65–99)
HBA1C MFR BLD: 6.4 %
HCT VFR BLD AUTO: 40 % (ref 34.8–46.1)
HDLC SERPL-MCNC: 57 MG/DL
HGB BLD-MCNC: 13.4 G/DL (ref 11.5–15.4)
IMM GRANULOCYTES # BLD AUTO: 0.04 THOUSAND/UL (ref 0–0.2)
IMM GRANULOCYTES NFR BLD AUTO: 1 % (ref 0–2)
IRON SATN MFR SERPL: 38 % (ref 15–50)
IRON SERPL-MCNC: 103 UG/DL (ref 50–212)
LDLC SERPL CALC-MCNC: 98 MG/DL (ref 0–100)
LYMPHOCYTES # BLD AUTO: 1.82 THOUSANDS/ΜL (ref 0.6–4.47)
LYMPHOCYTES NFR BLD AUTO: 25 % (ref 14–44)
MCH RBC QN AUTO: 28.2 PG (ref 26.8–34.3)
MCHC RBC AUTO-ENTMCNC: 33.5 G/DL (ref 31.4–37.4)
MCV RBC AUTO: 84 FL (ref 82–98)
MICROALBUMIN UR-MCNC: 13.1 MG/L
MICROALBUMIN/CREAT 24H UR: 15 MG/G CREATININE (ref 0–30)
MONOCYTES # BLD AUTO: 0.61 THOUSAND/ΜL (ref 0.17–1.22)
MONOCYTES NFR BLD AUTO: 8 % (ref 4–12)
NEUTROPHILS # BLD AUTO: 4.64 THOUSANDS/ΜL (ref 1.85–7.62)
NEUTS SEG NFR BLD AUTO: 62 % (ref 43–75)
NRBC BLD AUTO-RTO: 0 /100 WBCS
PLATELET # BLD AUTO: 300 THOUSANDS/UL (ref 149–390)
PMV BLD AUTO: 9.5 FL (ref 8.9–12.7)
POTASSIUM SERPL-SCNC: 3.3 MMOL/L (ref 3.5–5.3)
PROT SERPL-MCNC: 7.2 G/DL (ref 6.4–8.4)
RBC # BLD AUTO: 4.75 MILLION/UL (ref 3.81–5.12)
SODIUM SERPL-SCNC: 141 MMOL/L (ref 135–147)
TIBC SERPL-MCNC: 271 UG/DL (ref 250–450)
TRIGL SERPL-MCNC: 126 MG/DL
TSH SERPL DL<=0.05 MIU/L-ACNC: 0.78 UIU/ML (ref 0.45–4.5)
UIBC SERPL-MCNC: 168 UG/DL (ref 155–355)
VIT B12 SERPL-MCNC: 1049 PG/ML (ref 180–914)
WBC # BLD AUTO: 7.41 THOUSAND/UL (ref 4.31–10.16)

## 2024-09-17 PROCEDURE — 82607 VITAMIN B-12: CPT

## 2024-09-17 PROCEDURE — 85025 COMPLETE CBC W/AUTO DIFF WBC: CPT

## 2024-09-17 PROCEDURE — 80053 COMPREHEN METABOLIC PANEL: CPT

## 2024-09-17 PROCEDURE — 84443 ASSAY THYROID STIM HORMONE: CPT

## 2024-09-17 PROCEDURE — 83540 ASSAY OF IRON: CPT

## 2024-09-17 PROCEDURE — 80061 LIPID PANEL: CPT

## 2024-09-17 PROCEDURE — 82570 ASSAY OF URINE CREATININE: CPT

## 2024-09-17 PROCEDURE — 82043 UR ALBUMIN QUANTITATIVE: CPT

## 2024-09-17 PROCEDURE — 83036 HEMOGLOBIN GLYCOSYLATED A1C: CPT

## 2024-09-17 PROCEDURE — 82728 ASSAY OF FERRITIN: CPT

## 2024-09-17 PROCEDURE — 36415 COLL VENOUS BLD VENIPUNCTURE: CPT

## 2024-09-17 PROCEDURE — 83550 IRON BINDING TEST: CPT

## 2024-09-18 ENCOUNTER — TELEPHONE (OUTPATIENT)
Dept: ADMINISTRATIVE | Facility: OTHER | Age: 69
End: 2024-09-18

## 2024-09-18 NOTE — TELEPHONE ENCOUNTER
09/18/24 1:35 PM    Patient contacted to bring Advance Directive, POLST, or Living Will document to next scheduled pcp visit.[facility] Department was unable to leave a message; no answer/ line busy. Pt has no vm    Thank you.  Estefany Coffey MA  [facility]

## 2024-09-19 ENCOUNTER — OFFICE VISIT (OUTPATIENT)
Dept: FAMILY MEDICINE CLINIC | Facility: CLINIC | Age: 69
End: 2024-09-19
Payer: COMMERCIAL

## 2024-09-19 VITALS
OXYGEN SATURATION: 96 % | TEMPERATURE: 98 F | HEART RATE: 79 BPM | SYSTOLIC BLOOD PRESSURE: 112 MMHG | DIASTOLIC BLOOD PRESSURE: 76 MMHG

## 2024-09-19 DIAGNOSIS — Z23 ENCOUNTER FOR IMMUNIZATION: ICD-10-CM

## 2024-09-19 DIAGNOSIS — E03.9 ACQUIRED HYPOTHYROIDISM: ICD-10-CM

## 2024-09-19 DIAGNOSIS — E66.9 TYPE 2 DIABETES MELLITUS WITH OBESITY  (HCC): Primary | ICD-10-CM

## 2024-09-19 DIAGNOSIS — Z78.0 ASYMPTOMATIC POSTMENOPAUSAL STATE: ICD-10-CM

## 2024-09-19 DIAGNOSIS — Z98.84 HISTORY OF ROUX-EN-Y GASTRIC BYPASS: ICD-10-CM

## 2024-09-19 DIAGNOSIS — E11.69 TYPE 2 DIABETES MELLITUS WITH OBESITY  (HCC): Primary | ICD-10-CM

## 2024-09-19 PROCEDURE — G2211 COMPLEX E/M VISIT ADD ON: HCPCS | Performed by: FAMILY MEDICINE

## 2024-09-19 PROCEDURE — 99214 OFFICE O/P EST MOD 30 MIN: CPT | Performed by: FAMILY MEDICINE

## 2024-09-19 RX ORDER — LANCETS 33 GAUGE
EACH MISCELLANEOUS
Qty: 100 EACH | Refills: 3 | Status: SHIPPED | OUTPATIENT
Start: 2024-09-19

## 2024-09-19 RX ORDER — LEVOTHYROXINE SODIUM 125 UG/1
125 TABLET ORAL DAILY
Qty: 90 TABLET | Refills: 1 | Status: SHIPPED | OUTPATIENT
Start: 2024-09-19

## 2024-09-19 RX ORDER — BLOOD-GLUCOSE METER
KIT MISCELLANEOUS
Qty: 1 KIT | Refills: 0 | Status: SHIPPED | OUTPATIENT
Start: 2024-09-19

## 2024-09-19 RX ORDER — BLOOD SUGAR DIAGNOSTIC
STRIP MISCELLANEOUS
Qty: 100 EACH | Refills: 3 | Status: SHIPPED | OUTPATIENT
Start: 2024-09-19 | End: 2024-09-20

## 2024-09-19 RX ORDER — METFORMIN HCL 500 MG
2000 TABLET, EXTENDED RELEASE 24 HR ORAL
Qty: 360 TABLET | Refills: 1 | Status: SHIPPED | OUTPATIENT
Start: 2024-09-19

## 2024-09-19 NOTE — PROGRESS NOTES
Ambulatory Visit  Name: Lizabeth Santiago      : 1955      MRN: [id number]  Encounter Provider: Amado Tolentino DO  Encounter Date: 2024   Encounter department: [facility]    Assessment & Plan  Type 2 diabetes mellitus with obesity  (HCC)  With excellent control and hemoglobin A1c of 0.4  Lab Results   Component Value Date    HGBA1C 6.4 (H) 2024       Orders:    Albumin / creatinine urine ratio; Future    Comprehensive metabolic panel; Future    Hemoglobin A1C; Future    CBC and differential; Future    TSH, 3rd generation with Free T4 reflex; Future    Lipid Panel with Direct LDL reflex; Future    Blood Glucose Monitoring Suppl (OneTouch Verio Reflect) w/Device KIT; Check blood sugars once daily. Please substitute with appropriate alternative as covered by patient's insurance. Dx: E11.65    glucose blood (OneTouch Verio) test strip; Check blood sugars once daily. Please substitute with appropriate alternative as covered by patient's insurance. Dx: E11.65    OneTouch Delica Lancets 33G MISC; Check blood sugars once daily. Please substitute with appropriate alternative as covered by patient's insurance. Dx: E11.65    Acquired hypothyroidism  Euthyroid on current supplementation of levothyroxine 125 mcg daily       History of Hima-en-Y gastric bypass  Doing well with a BMI of 37.76       Asymptomatic postmenopausal state  Will obtain a DEXA scan       Encounter for immunization            History of Present Illness     HPI      Review of Systems   Constitutional:  Negative for chills and fever.   HENT:  Negative for ear pain and sore throat.    Eyes:  Negative for pain and visual disturbance.   Respiratory:  Negative for cough and shortness of breath.    Cardiovascular:  Negative for chest pain and palpitations.   Gastrointestinal:  Negative for abdominal pain and vomiting.   Genitourinary:  Negative for dysuria and hematuria.   Musculoskeletal:  Negative for  arthralgias and back pain.   Skin:  Negative for color change and rash.   Neurological:  Negative for seizures and syncope.   All other systems reviewed and are negative.          Objective     /76   Pulse 79   Temp 98 °F (36.7 °C)   SpO2 96%     Physical Exam  Constitutional:       Appearance: She is obese.   HENT:      Head: Normocephalic and atraumatic.      Right Ear: Tympanic membrane, ear canal and external ear normal.      Left Ear: Tympanic membrane, ear canal and external ear normal.      Nose: Nose normal.      Mouth/Throat:      Mouth: Mucous membranes are moist.      Pharynx: Oropharynx is clear.   Cardiovascular:      Rate and Rhythm: Normal rate and regular rhythm.      Pulses: Normal pulses.      Heart sounds: Normal heart sounds.   Pulmonary:      Effort: Pulmonary effort is normal.      Breath sounds: Normal breath sounds.   Abdominal:      General: Abdomen is flat. Bowel sounds are normal.      Palpations: Abdomen is soft.   Musculoskeletal:         General: Normal range of motion.      Cervical back: Normal range of motion and neck supple.   Skin:     General: Skin is warm and dry.   Neurological:      General: No focal deficit present.      Mental Status: She is alert and oriented to person, place, and time.   Psychiatric:         Mood and Affect: Mood normal.         Behavior: Behavior normal.

## 2024-09-19 NOTE — ASSESSMENT & PLAN NOTE
With excellent control and hemoglobin A1c of 0.4  Lab Results   Component Value Date    HGBA1C 6.4 (H) 09/17/2024       Orders:    Albumin / creatinine urine ratio; Future    Comprehensive metabolic panel; Future    Hemoglobin A1C; Future    CBC and differential; Future    TSH, 3rd generation with Free T4 reflex; Future    Lipid Panel with Direct LDL reflex; Future    Blood Glucose Monitoring Suppl (OneTouch Verio Reflect) w/Device KIT; Check blood sugars once daily. Please substitute with appropriate alternative as covered by patient's insurance. Dx: E11.65    glucose blood (OneTouch Verio) test strip; Check blood sugars once daily. Please substitute with appropriate alternative as covered by patient's insurance. Dx: E11.65    OneTouch Delica Lancets 33G MISC; Check blood sugars once daily. Please substitute with appropriate alternative as covered by patient's insurance. Dx: E11.65

## 2024-09-20 DIAGNOSIS — E11.69 TYPE 2 DIABETES MELLITUS WITH OBESITY  (HCC): ICD-10-CM

## 2024-09-20 DIAGNOSIS — E66.9 TYPE 2 DIABETES MELLITUS WITH OBESITY  (HCC): ICD-10-CM

## 2024-09-20 RX ORDER — BLOOD SUGAR DIAGNOSTIC
STRIP MISCELLANEOUS
Qty: 100 EACH | Refills: 3 | Status: SHIPPED | OUTPATIENT
Start: 2024-09-20

## 2024-09-23 NOTE — TELEPHONE ENCOUNTER
Spoke to Pharm; did receive the lancets but too soon to fill, did not receive test strips e-scribed nor faxed. Request to resend order for test strips.     Refaxed to [facility] [phone].

## 2024-09-23 NOTE — TELEPHONE ENCOUNTER
Patient states she was at [facility] more times and they still told her they do not have the script for the test strips, please advise

## 2024-10-01 ENCOUNTER — HOSPITAL ENCOUNTER (OUTPATIENT)
Dept: BONE DENSITY | Facility: HOSPITAL | Age: 69
Discharge: HOME | End: 2024-10-01
Attending: FAMILY MEDICINE
Payer: COMMERCIAL

## 2024-10-01 VITALS — HEIGHT: 64 IN | BODY MASS INDEX: 39.78 KG/M2 | WEIGHT: 233 LBS

## 2024-10-01 DIAGNOSIS — Z78.0 ASYMPTOMATIC POSTMENOPAUSAL STATE: ICD-10-CM

## 2024-10-01 PROCEDURE — 77080 DXA BONE DENSITY AXIAL: CPT

## 2024-10-02 ENCOUNTER — TELEPHONE (OUTPATIENT)
Dept: FAMILY MEDICINE CLINIC | Facility: CLINIC | Age: 69
End: 2024-10-02

## 2024-10-02 NOTE — TELEPHONE ENCOUNTER
Inform the patient that her DEXA scan shows low bone density recommended that she take an over-the-counter calcium and vitamin D supplement and begin weightbearing exercises with a recheck of the DEXA scan in a year

## 2024-10-04 DIAGNOSIS — I10 ESSENTIAL HYPERTENSION: ICD-10-CM

## 2024-10-04 RX ORDER — CARVEDILOL 6.25 MG/1
6.25 TABLET ORAL 2 TIMES DAILY
Qty: 180 TABLET | Refills: 1 | Status: SHIPPED | OUTPATIENT
Start: 2024-10-04

## 2024-12-23 ENCOUNTER — VBI (OUTPATIENT)
Dept: ADMINISTRATIVE | Facility: OTHER | Age: 69
End: 2024-12-23

## 2024-12-23 NOTE — TELEPHONE ENCOUNTER
12/23/24 2:20 PM     Chart reviewed for Hemoglobin A1c was/were submitted to the patient's insurance.     Winifred Marin   [facility]

## 2025-01-06 ENCOUNTER — HOSPITAL ENCOUNTER (OUTPATIENT)
Dept: MAMMOGRAPHY | Facility: HOSPITAL | Age: 70
Discharge: HOME | End: 2025-01-06
Attending: FAMILY MEDICINE
Payer: COMMERCIAL

## 2025-01-06 VITALS — WEIGHT: 233 LBS | BODY MASS INDEX: 39.78 KG/M2 | HEIGHT: 64 IN

## 2025-01-06 DIAGNOSIS — Z12.31 SCREENING MAMMOGRAM FOR BREAST CANCER: ICD-10-CM

## 2025-01-06 PROCEDURE — 77067 SCR MAMMO BI INCL CAD: CPT

## 2025-01-06 PROCEDURE — 77063 BREAST TOMOSYNTHESIS BI: CPT

## 2025-01-29 ENCOUNTER — TELEPHONE (OUTPATIENT)
Age: 70
End: 2025-01-29

## 2025-01-29 NOTE — LETTER
January 29, 2025     Patient: Lizabeth Santiago  YOB: 1955  Date of Visit: 1/29/2025      To Whom it May Concern:    Lizabeth Santiago is under my professional care. Lizabeth was seen in my office on the following dates and times:    03/21/2024 at 11:00 am  06/05/2024 at 9:00 am  09/19/2024 at 10:15 am    If you have any questions or concerns, please don't hesitate to call.         Sincerely,                Dr. Amado Tolentino D.O.

## 2025-01-29 NOTE — TELEPHONE ENCOUNTER
Maribell called in for her and spouse Lino today.  They are needing information to send in with their tax forms.  They need something from office she stated listing the dates/times they had visits here in the calendar year of 2024.    Please contact her for more information if needed.  Maribell said she can be called and she will come pick it up as she lives close.

## 2025-02-07 ENCOUNTER — APPOINTMENT (OUTPATIENT)
Dept: LAB | Facility: HOSPITAL | Age: 70
End: 2025-02-07
Payer: COMMERCIAL

## 2025-02-07 ENCOUNTER — RESULTS FOLLOW-UP (OUTPATIENT)
Dept: FAMILY MEDICINE CLINIC | Facility: CLINIC | Age: 70
End: 2025-02-07

## 2025-02-07 DIAGNOSIS — Z98.84 HISTORY OF ROUX-EN-Y GASTRIC BYPASS: ICD-10-CM

## 2025-02-07 DIAGNOSIS — E66.9 TYPE 2 DIABETES MELLITUS WITH OBESITY  (HCC): ICD-10-CM

## 2025-02-07 DIAGNOSIS — K90.9 MALABSORPTION SYNDROME: ICD-10-CM

## 2025-02-07 DIAGNOSIS — D50.9 IRON DEFICIENCY ANEMIA, UNSPECIFIED IRON DEFICIENCY ANEMIA TYPE: ICD-10-CM

## 2025-02-07 DIAGNOSIS — E11.69 TYPE 2 DIABETES MELLITUS WITH OBESITY  (HCC): ICD-10-CM

## 2025-02-07 DIAGNOSIS — E53.8 VITAMIN B 12 DEFICIENCY: ICD-10-CM

## 2025-02-07 LAB
CHOLEST SERPL-MCNC: 180 MG/DL (ref ?–200)
ERYTHROCYTE [DISTWIDTH] IN BLOOD BY AUTOMATED COUNT: 12.9 % (ref 11.6–15.1)
FERRITIN SERPL-MCNC: 76 NG/ML (ref 11–307)
HCT VFR BLD AUTO: 42.1 % (ref 34.8–46.1)
HDLC SERPL-MCNC: 58 MG/DL
HGB BLD-MCNC: 13.5 G/DL (ref 11.5–15.4)
IRON SATN MFR SERPL: 25 % (ref 15–50)
IRON SERPL-MCNC: 74 UG/DL (ref 50–212)
LDLC SERPL CALC-MCNC: 101 MG/DL (ref 0–100)
MCH RBC QN AUTO: 26.9 PG (ref 26.8–34.3)
MCHC RBC AUTO-ENTMCNC: 32.1 G/DL (ref 31.4–37.4)
MCV RBC AUTO: 84 FL (ref 82–98)
PLATELET # BLD AUTO: 312 THOUSANDS/UL (ref 149–390)
PMV BLD AUTO: 9.2 FL (ref 8.9–12.7)
RBC # BLD AUTO: 5.01 MILLION/UL (ref 3.81–5.12)
TIBC SERPL-MCNC: 294 UG/DL (ref 250–450)
TRANSFERRIN SERPL-MCNC: 210 MG/DL (ref 203–362)
TRIGL SERPL-MCNC: 104 MG/DL (ref ?–150)
UIBC SERPL-MCNC: 220 UG/DL (ref 155–355)
WBC # BLD AUTO: 7.95 THOUSAND/UL (ref 4.31–10.16)

## 2025-02-07 PROCEDURE — 80061 LIPID PANEL: CPT

## 2025-02-07 PROCEDURE — 83540 ASSAY OF IRON: CPT

## 2025-02-07 PROCEDURE — 83550 IRON BINDING TEST: CPT

## 2025-02-07 PROCEDURE — 36415 COLL VENOUS BLD VENIPUNCTURE: CPT

## 2025-02-07 PROCEDURE — 82728 ASSAY OF FERRITIN: CPT

## 2025-02-07 PROCEDURE — 85027 COMPLETE CBC AUTOMATED: CPT

## 2025-02-11 ENCOUNTER — OFFICE VISIT (OUTPATIENT)
Dept: HEMATOLOGY ONCOLOGY | Facility: CLINIC | Age: 70
End: 2025-02-11
Payer: COMMERCIAL

## 2025-02-11 VITALS
TEMPERATURE: 97.7 F | BODY MASS INDEX: 40.7 KG/M2 | WEIGHT: 238.4 LBS | DIASTOLIC BLOOD PRESSURE: 77 MMHG | HEIGHT: 64 IN | OXYGEN SATURATION: 98 % | SYSTOLIC BLOOD PRESSURE: 144 MMHG | HEART RATE: 71 BPM

## 2025-02-11 DIAGNOSIS — E53.8 B12 DEFICIENCY: ICD-10-CM

## 2025-02-11 DIAGNOSIS — R79.0 LOW FERRITIN: ICD-10-CM

## 2025-02-11 DIAGNOSIS — Z98.84 HISTORY OF ROUX-EN-Y GASTRIC BYPASS: Primary | ICD-10-CM

## 2025-02-11 PROCEDURE — 99214 OFFICE O/P EST MOD 30 MIN: CPT | Performed by: NURSE PRACTITIONER

## 2025-02-11 NOTE — ASSESSMENT & PLAN NOTE
Labs from 2/7/2025 show normal CBC, iron saturation 25%, ferritin 76, vitamin B12 level in September 2024 was 1049.  Overall patient is able to function without restriction.  We discussed restarting Feraheme infusions but given ferritin level is above 75 will continue to monitor for now.  Repeat labs every 6 months and follow-up in 1 year.  She is up-to-date on mammogram in 2025 and colonoscopy was in 2017.    Orders:    CBC; Standing    Iron Panel (Includes Ferritin, Iron Sat%, Iron, and TIBC); Standing    Vitamin B12; Standing

## 2025-02-11 NOTE — PROGRESS NOTES
Name: Lizabeth Santiago      : 1955      MRN: [id number]  Encounter Provider: JONATHAN Valerio  Encounter Date: 2025   Encounter department: [facility]  :  Assessment & Plan  History of Hima-en-Y gastric bypass   Labs from 2025 show normal CBC, iron saturation 25%, ferritin 76, vitamin B12 level in 2024 was 1049.  Overall patient is able to function without restriction.  We discussed restarting Feraheme infusions but given ferritin level is above 75 will continue to monitor for now.  Repeat labs every 6 months and follow-up in 1 year.  She is up-to-date on mammogram in  and colonoscopy was in .    Orders:    CBC; Standing    Iron Panel (Includes Ferritin, Iron Sat%, Iron, and TIBC); Standing    Vitamin B12; Standing    B12 deficiency    Orders:    CBC; Standing    Vitamin B12; Standing    Low ferritin    Orders:    CBC; Standing    Iron Panel (Includes Ferritin, Iron Sat%, Iron, and TIBC); Standing    History of Present Illness   Chief Complaint   Patient presents with    Follow-up     Pertinent Medical History   25: Patient underwent bilateral brachioplasty, liposuction of the lateral and medial thighs in 2023.  She had been on maintenance infusions every 84 days most recent in 2023.  Labs from 2025 show normal CBC, iron saturation 25% ferritin 76.    02/10/2023: Patient is a 67-year-old female with a history of hyperlipidemia, hypertension, hypothyroid, type 2 diabetes, and Hima-en-Y gastric bypass in .  She was initially seen by me for iron deficiency anemia.  Patient was anticipated to have abdominoplasty in 2022 however was canceled secondary to anemia after hemoglobin was 7.2, iron saturation 3% and ferritin level 3.  Patient underwent Feraheme infusions on 2022 tolerating without difficulty.  Most recent hemoglobin is 12.8 g/dL.  Iron saturation 10% and ferritin level 10.  Patient  "is expected to undergo abdominoplasty on 2/15/2023.  We made arrangements for maintenance infusions, and follow up in one year.      Review of Systems   Constitutional:  Negative for activity change, appetite change, fatigue, fever and unexpected weight change.   Respiratory:  Negative for cough and shortness of breath.    Cardiovascular:  Negative for chest pain and leg swelling.   Gastrointestinal:  Negative for abdominal pain, constipation, diarrhea and nausea.   Endocrine: Negative for cold intolerance and heat intolerance.   Musculoskeletal:  Negative for arthralgias and myalgias.   Skin: Negative.    Neurological:  Negative for dizziness, weakness and headaches.   Hematological:  Negative for adenopathy. Does not bruise/bleed easily.     Objective   /77 (BP Location: Left arm, Patient Position: Sitting, Cuff Size: Standard)   Pulse 71   Temp 97.7 °F (36.5 °C) (Temporal)   Ht 5' 3.5\" (1.613 m)   Wt 108 kg (238 lb 6.4 oz)   SpO2 98%   BMI 41.57 kg/m²     Physical Exam  Vitals reviewed.   Constitutional:       Appearance: Normal appearance. She is well-developed.   HENT:      Head: Normocephalic and atraumatic.   Eyes:      Conjunctiva/sclera: Conjunctivae normal.      Pupils: Pupils are equal, round, and reactive to light.   Pulmonary:      Effort: Pulmonary effort is normal. No respiratory distress.   Musculoskeletal:         General: Normal range of motion.      Cervical back: Normal range of motion.   Lymphadenopathy:      Cervical: No cervical adenopathy.   Skin:     General: Skin is dry.   Neurological:      Mental Status: She is alert and oriented to person, place, and time.   Psychiatric:         Behavior: Behavior normal.     Labs: I have reviewed the following labs:  Results for orders placed or performed in visit on 02/07/25   CBC   Result Value Ref Range    WBC 7.95 4.31 - 10.16 Thousand/uL    RBC 5.01 3.81 - 5.12 Million/uL    Hemoglobin 13.5 11.5 - 15.4 g/dL    Hematocrit 42.1 34.8 - " 46.1 %    MCV 84 82 - 98 fL    MCH 26.9 26.8 - 34.3 pg    MCHC 32.1 31.4 - 37.4 g/dL    RDW 12.9 11.6 - 15.1 %    Platelets 312 149 - 390 Thousands/uL    MPV 9.2 8.9 - 12.7 fL   Lipid Panel with Direct LDL reflex   Result Value Ref Range    Cholesterol 180 See Comment mg/dL    Triglycerides 104 See Comment mg/dL    HDL, Direct 58 >=50 mg/dL    LDL Calculated 101 (H) 0 - 100 mg/dL   TIBC Panel (incl. Iron, TIBC, % Iron Saturation)   Result Value Ref Range    Iron Saturation 25 15 - 50 %    TIBC 294 250 - 450 ug/dL    Iron 74 50 - 212 ug/dL    Transferrin 210 203 - 362 mg/dL    UIBC 220 155 - 355 ug/dL   Result Value Ref Range    Ferritin 76 11 - 307 ng/mL

## 2025-03-04 ENCOUNTER — TELEPHONE (OUTPATIENT)
Dept: FAMILY MEDICINE CLINIC | Facility: CLINIC | Age: 70
End: 2025-03-04

## 2025-03-04 NOTE — TELEPHONE ENCOUNTER
Called patient left message on voicemail to contact the office to reschedule appointment on 3/19/2025 due to provider being out of office. When patient calls back please reschedule.

## 2025-03-06 DIAGNOSIS — I10 ESSENTIAL HYPERTENSION: ICD-10-CM

## 2025-03-06 DIAGNOSIS — E78.5 DYSLIPIDEMIA: ICD-10-CM

## 2025-03-06 RX ORDER — HYDROCHLOROTHIAZIDE 25 MG/1
25 TABLET ORAL DAILY
Qty: 90 TABLET | Refills: 1 | Status: SHIPPED | OUTPATIENT
Start: 2025-03-06

## 2025-03-06 RX ORDER — AMLODIPINE BESYLATE 5 MG/1
5 TABLET ORAL DAILY
Qty: 90 TABLET | Refills: 1 | Status: SHIPPED | OUTPATIENT
Start: 2025-03-06

## 2025-03-06 RX ORDER — EZETIMIBE 10 MG/1
10 TABLET ORAL DAILY
Qty: 90 TABLET | Refills: 1 | Status: SHIPPED | OUTPATIENT
Start: 2025-03-06

## 2025-03-10 ENCOUNTER — RA CDI HCC (OUTPATIENT)
Dept: OTHER | Facility: HOSPITAL | Age: 70
End: 2025-03-10

## 2025-03-10 PROBLEM — Z41.1 ENCOUNTER FOR COSMETIC SURGERY: Status: RESOLVED | Noted: 2023-09-26 | Resolved: 2025-03-10

## 2025-03-12 ENCOUNTER — APPOINTMENT (OUTPATIENT)
Dept: LAB | Facility: HOSPITAL | Age: 70
End: 2025-03-12
Payer: COMMERCIAL

## 2025-03-12 DIAGNOSIS — E66.9 TYPE 2 DIABETES MELLITUS WITH OBESITY  (HCC): ICD-10-CM

## 2025-03-12 DIAGNOSIS — E11.69 TYPE 2 DIABETES MELLITUS WITH OBESITY  (HCC): ICD-10-CM

## 2025-03-12 LAB
ALBUMIN SERPL BCG-MCNC: 4.2 G/DL (ref 3.5–5)
ALP SERPL-CCNC: 89 U/L (ref 34–104)
ALT SERPL W P-5'-P-CCNC: 25 U/L (ref 7–52)
ANION GAP SERPL CALCULATED.3IONS-SCNC: 13 MMOL/L (ref 4–13)
AST SERPL W P-5'-P-CCNC: 20 U/L (ref 13–39)
BASOPHILS # BLD AUTO: 0.02 THOUSANDS/ÂΜL (ref 0–0.1)
BASOPHILS NFR BLD AUTO: 0 % (ref 0–1)
BILIRUB SERPL-MCNC: 1.14 MG/DL (ref 0.2–1)
BUN SERPL-MCNC: 15 MG/DL (ref 5–25)
CALCIUM SERPL-MCNC: 9.6 MG/DL (ref 8.4–10.2)
CHLORIDE SERPL-SCNC: 98 MMOL/L (ref 96–108)
CO2 SERPL-SCNC: 27 MMOL/L (ref 21–32)
CREAT SERPL-MCNC: 0.99 MG/DL (ref 0.6–1.3)
CREAT UR-MCNC: 54.3 MG/DL
EOSINOPHIL # BLD AUTO: 0.15 THOUSAND/ÂΜL (ref 0–0.61)
EOSINOPHIL NFR BLD AUTO: 2 % (ref 0–6)
ERYTHROCYTE [DISTWIDTH] IN BLOOD BY AUTOMATED COUNT: 13.2 % (ref 11.6–15.1)
EST. AVERAGE GLUCOSE BLD GHB EST-MCNC: 140 MG/DL
GFR SERPL CREATININE-BSD FRML MDRD: 59 ML/MIN/1.73SQ M
GLUCOSE P FAST SERPL-MCNC: 146 MG/DL (ref 65–99)
HBA1C MFR BLD: 6.5 %
HCT VFR BLD AUTO: 43.7 % (ref 34.8–46.1)
HGB BLD-MCNC: 14 G/DL (ref 11.5–15.4)
IMM GRANULOCYTES # BLD AUTO: 0.03 THOUSAND/UL (ref 0–0.2)
IMM GRANULOCYTES NFR BLD AUTO: 0 % (ref 0–2)
LYMPHOCYTES # BLD AUTO: 1.78 THOUSANDS/ÂΜL (ref 0.6–4.47)
LYMPHOCYTES NFR BLD AUTO: 22 % (ref 14–44)
MCH RBC QN AUTO: 27.5 PG (ref 26.8–34.3)
MCHC RBC AUTO-ENTMCNC: 32 G/DL (ref 31.4–37.4)
MCV RBC AUTO: 86 FL (ref 82–98)
MICROALBUMIN UR-MCNC: <7 MG/L
MONOCYTES # BLD AUTO: 0.54 THOUSAND/ÂΜL (ref 0.17–1.22)
MONOCYTES NFR BLD AUTO: 7 % (ref 4–12)
NEUTROPHILS # BLD AUTO: 5.48 THOUSANDS/ÂΜL (ref 1.85–7.62)
NEUTS SEG NFR BLD AUTO: 69 % (ref 43–75)
NRBC BLD AUTO-RTO: 0 /100 WBCS
PLATELET # BLD AUTO: 348 THOUSANDS/UL (ref 149–390)
PMV BLD AUTO: 9.2 FL (ref 8.9–12.7)
POTASSIUM SERPL-SCNC: 3.7 MMOL/L (ref 3.5–5.3)
PROT SERPL-MCNC: 7.4 G/DL (ref 6.4–8.4)
RBC # BLD AUTO: 5.09 MILLION/UL (ref 3.81–5.12)
SODIUM SERPL-SCNC: 138 MMOL/L (ref 135–147)
TSH SERPL DL<=0.05 MIU/L-ACNC: 1.55 UIU/ML (ref 0.45–4.5)
WBC # BLD AUTO: 8 THOUSAND/UL (ref 4.31–10.16)

## 2025-03-12 PROCEDURE — 83036 HEMOGLOBIN GLYCOSYLATED A1C: CPT

## 2025-03-12 PROCEDURE — 80053 COMPREHEN METABOLIC PANEL: CPT

## 2025-03-12 PROCEDURE — 82570 ASSAY OF URINE CREATININE: CPT

## 2025-03-12 PROCEDURE — 85025 COMPLETE CBC W/AUTO DIFF WBC: CPT

## 2025-03-12 PROCEDURE — 82043 UR ALBUMIN QUANTITATIVE: CPT

## 2025-03-12 PROCEDURE — 36415 COLL VENOUS BLD VENIPUNCTURE: CPT

## 2025-03-12 PROCEDURE — 84443 ASSAY THYROID STIM HORMONE: CPT

## 2025-03-17 ENCOUNTER — OFFICE VISIT (OUTPATIENT)
Dept: FAMILY MEDICINE CLINIC | Facility: CLINIC | Age: 70
End: 2025-03-17
Payer: COMMERCIAL

## 2025-03-17 VITALS
DIASTOLIC BLOOD PRESSURE: 76 MMHG | TEMPERATURE: 97 F | BODY MASS INDEX: 40.8 KG/M2 | RESPIRATION RATE: 16 BRPM | HEIGHT: 64 IN | WEIGHT: 239 LBS | SYSTOLIC BLOOD PRESSURE: 126 MMHG | HEART RATE: 76 BPM

## 2025-03-17 DIAGNOSIS — E11.69 TYPE 2 DIABETES MELLITUS WITH OBESITY  (HCC): Primary | ICD-10-CM

## 2025-03-17 DIAGNOSIS — E66.01 MORBID OBESITY (HCC): ICD-10-CM

## 2025-03-17 DIAGNOSIS — Z98.84 HISTORY OF ROUX-EN-Y GASTRIC BYPASS: ICD-10-CM

## 2025-03-17 DIAGNOSIS — I10 ESSENTIAL HYPERTENSION: ICD-10-CM

## 2025-03-17 DIAGNOSIS — E66.9 TYPE 2 DIABETES MELLITUS WITH OBESITY  (HCC): Primary | ICD-10-CM

## 2025-03-17 DIAGNOSIS — E03.9 ACQUIRED HYPOTHYROIDISM: ICD-10-CM

## 2025-03-17 DIAGNOSIS — E55.9 VITAMIN D DEFICIENCY: ICD-10-CM

## 2025-03-17 PROCEDURE — G2211 COMPLEX E/M VISIT ADD ON: HCPCS | Performed by: FAMILY MEDICINE

## 2025-03-17 PROCEDURE — G0439 PPPS, SUBSEQ VISIT: HCPCS | Performed by: FAMILY MEDICINE

## 2025-03-17 PROCEDURE — 99214 OFFICE O/P EST MOD 30 MIN: CPT | Performed by: FAMILY MEDICINE

## 2025-03-17 NOTE — PROGRESS NOTES
Name: Lizabeth Santiago      : 1955      MRN: [id number]  Encounter Provider: Amado Tolentino DO  Encounter Date: 3/17/2025   Encounter department: [facility]    Assessment & Plan  Type 2 diabetes mellitus with obesity  (HCC)    Lab Results   Component Value Date    HGBA1C 6.5 (H) 2025   Hemoglobin A1c of 6.5.  The patient has a BMI of 41.67 post Hima-en-Y gastric bypass.  The patient would like to try Ozempic  Orders:  •  semaglutide, 0.25 or 0.5 mg/dose, (Ozempic, 0.25 or 0.5 MG/DOSE,) 2 mg/3 mL injection pen; 0.25 mg under the skin every 7 days for 4 doses (28 days), THEN 0.5 mg under the skin every 7 days  •  Albumin / creatinine urine ratio; Future  •  Comprehensive metabolic panel; Future  •  Hemoglobin A1C; Future  •  CBC and differential; Future  •  Lipid Panel with Direct LDL reflex; Future  •  TSH, 3rd generation with Free T4 reflex; Future    Morbid obesity (HCC)  Post Hima-en-Y gastric bypass       Essential hypertension  With a blood pressure controlled on the current regimen       History of Hima-en-Y gastric bypass         Acquired hypothyroidism  Euthyroid on current replacement therapy    Diabetic Foot Exam    Diabetic Foot Exam       Vitamin D deficiency    Orders:  •  Vitamin D 25 hydroxy; Future      Depression Screening and Follow-up Plan: Patient was screened for depression during today's encounter. They screened negative with a PHQ-2 score of 0.        Preventive health issues were discussed with patient, and age appropriate screening tests were ordered as noted in patient's After Visit Summary. Personalized health advice and appropriate referrals for health education or preventive services given if needed, as noted in patient's After Visit Summary.    History of Present Illness     Diabetes  Pertinent negatives for hypoglycemia include no seizures. Pertinent negatives for diabetes include no chest pain.      Patient Care Team:  Amado Tolentino  DO as PCP - General  Issac Solano as  (Oncology)    Review of Systems   Constitutional:  Negative for chills and fever.   HENT:  Negative for ear pain and sore throat.    Eyes:  Negative for pain and visual disturbance.   Respiratory:  Negative for cough and shortness of breath.    Cardiovascular:  Negative for chest pain and palpitations.   Gastrointestinal:  Negative for abdominal pain and vomiting.   Genitourinary:  Negative for dysuria and hematuria.   Musculoskeletal:  Negative for arthralgias and back pain.   Skin:  Negative for color change and rash.   Neurological:  Negative for seizures and syncope.   All other systems reviewed and are negative.    Medical History Reviewed by provider this encounter:  Tobacco  Allergies  Meds  Problems  Med Hx  Surg Hx  Fam Hx       Annual Wellness Visit Questionnaire       Health Risk Assessment:   Patient rates overall health as good. Patient feels that their physical health rating is same. Eyesight was rated as same. Hearing was rated as same. Patient feels that their emotional and mental health rating is same. Patients states they are never, rarely angry. Patient states they are never, rarely unusually tired/fatigued. Pain experienced in the last 7 days has been none. Patient states that she has experienced no weight loss or gain in last 6 months.     Depression Screening:   PHQ-2 Score: 0      Fall Risk Screening:   In the past year, patient has experienced: no history of falling in past year      Urinary Incontinence Screening:   Patient has not leaked urine accidently in the last six months.     Home Safety:  Patient does not have trouble with stairs inside or outside of their home.     Nutrition:   Current diet is Diabetic and Low Carb.     Medications:   Patient is currently taking over-the-counter supplements. OTC medications include: see medication list. Patient is able to manage medications.     Activities of Daily Living  (ADLs)/Instrumental Activities of Daily Living (IADLs):   Walk and transfer into and out of bed and chair?: Yes  Dress and groom yourself?: Yes    Bathe or shower yourself?: Yes    Feed yourself? Yes  Do your laundry/housekeeping?: Yes  Manage your money, pay your bills and track your expenses?: Yes  Make your own meals?: Yes    Do your own shopping?: Yes    Previous Hospitalizations:   Any hospitalizations or ED visits within the last 12 months?: No      Advance Care Planning:   Living will: Yes    Advanced directive: Yes      PREVENTIVE SCREENINGS      Cardiovascular Screening:    General: Screening Not Indicated and History Lipid Disorder      Diabetes Screening:     General: Screening Not Indicated and History Diabetes      Colorectal Cancer Screening:     General: Screening Current      Breast Cancer Screening:     General: Screening Current      Cervical Cancer Screening:    General: Screening Not Indicated      Lung Cancer Screening:     General: Screening Not Indicated      Hepatitis C Screening:    General: Screening Current    Screening, Brief Intervention, and Referral to Treatment (SBIRT)     Screening  Typical number of drinks in a day: 0  Typical number of drinks in a week: 0  Interpretation: Low risk drinking behavior.    Single Item Drug Screening:  How often have you used an illegal drug (including marijuana) or a prescription medication for non-medical reasons in the past year? never    Single Item Drug Screen Score: 0  Interpretation: Negative screen for possible drug use disorder    Social Drivers of Health     Financial Resource Strain: Low Risk  (11/21/2023)    Overall Financial Resource Strain (CARDIA)    • Difficulty of Paying Living Expenses: Not very hard   Food Insecurity: No Food Insecurity (3/17/2025)    Hunger Vital Sign    • Worried About Running Out of Food in the Last Year: Never true    • Ran Out of Food in the Last Year: Never true   Transportation Needs: No Transportation Needs  "(3/17/2025)    PRAPARE - Transportation    • Lack of Transportation (Medical): No    • Lack of Transportation (Non-Medical): No   Housing Stability: Unknown (3/17/2025)    Housing Stability Vital Sign    • Unable to Pay for Housing in the Last Year: No    • Homeless in the Last Year: No   Utilities: Not At Risk (3/17/2025)    [facility] Utilities    • Threatened with loss of utilities: No     No results found.    Objective   /76   Pulse 76   Temp (!) 97 °F (36.1 °C)   Resp 16   Ht 5' 3.5\" (1.613 m)   Wt 108 kg (239 lb)   BMI 41.67 kg/m²     Physical Exam  Constitutional:       Appearance: She is obese.   HENT:      Head: Normocephalic and atraumatic.      Right Ear: Tympanic membrane, ear canal and external ear normal.      Left Ear: Tympanic membrane, ear canal and external ear normal.      Nose: Nose normal.      Mouth/Throat:      Mouth: Mucous membranes are moist.      Pharynx: Oropharynx is clear.   Eyes:      Extraocular Movements: Extraocular movements intact.      Conjunctiva/sclera: Conjunctivae normal.      Pupils: Pupils are equal, round, and reactive to light.   Cardiovascular:      Rate and Rhythm: Normal rate and regular rhythm.      Pulses: Normal pulses.      Heart sounds: Normal heart sounds.   Pulmonary:      Effort: Pulmonary effort is normal.      Breath sounds: Normal breath sounds.   Abdominal:      General: Abdomen is flat. Bowel sounds are normal.      Palpations: Abdomen is soft.   Musculoskeletal:         General: Normal range of motion.      Cervical back: Normal range of motion and neck supple.   Skin:     General: Skin is warm and dry.   Neurological:      General: No focal deficit present.      Mental Status: She is alert and oriented to person, place, and time.   Psychiatric:         Mood and Affect: Mood normal.         Behavior: Behavior normal.         "

## 2025-03-17 NOTE — PROGRESS NOTES
Diabetic Foot Exam    Patient's shoes and socks removed.    Right Foot/Ankle   Right Foot Inspection  Skin Exam: skin normal and skin intact. No dry skin, no warmth, no callus, no erythema, no maceration, no abnormal color, no pre-ulcer, no ulcer and no callus.     Toe Exam: ROM and strength within normal limits.     Sensory   Vibration: intact  Proprioception: intact  Monofilament testing: intact    Vascular  Capillary refills: < 3 seconds  The right DP pulse is 2+. The right PT pulse is 2+.     Left Foot/Ankle  Left Foot Inspection  Skin Exam: skin normal and skin intact. No dry skin, no warmth, no erythema, no maceration, normal color, no pre-ulcer, no ulcer and no callus.     Toe Exam: ROM and strength within normal limits.     Sensory   Vibration: intact  Proprioception: intact  Monofilament testing: intact    Vascular  Capillary refills: < 3 seconds  The left DP pulse is 2+. The left PT pulse is 2+.     Assign Risk Category  No deformity present  No loss of protective sensation  No weak pulses  Risk: 0

## 2025-03-17 NOTE — PATIENT INSTRUCTIONS
Medicare Preventive Visit Patient Instructions  Thank you for completing your Welcome to Medicare Visit or Medicare Annual Wellness Visit today. Your next wellness visit will be due in one year (3/18/2026).  The screening/preventive services that you may require over the next 5-10 years are detailed below. Some tests may not apply to you based off risk factors and/or age. Screening tests ordered at today's visit but not completed yet may show as past due. Also, please note that scanned in results may not display below.  Preventive Screenings:  Service Recommendations Previous Testing/Comments   Colorectal Cancer Screening  * Colonoscopy    * Fecal Occult Blood Test (FOBT)/Fecal Immunochemical Test (FIT)  * Fecal DNA/Cologuard Test  * Flexible Sigmoidoscopy Age: 45-75 years old   Colonoscopy: every 10 years (may be performed more frequently if at higher risk)  OR  FOBT/FIT: every 1 year  OR  Cologuard: every 3 years  OR  Sigmoidoscopy: every 5 years  Screening may be recommended earlier than age 45 if at higher risk for colorectal cancer. Also, an individualized decision between you and your healthcare provider will decide whether screening between the ages of 76-85 would be appropriate. Colonoscopy: 08/25/2017  FOBT/FIT: Not on file  Cologuard: Not on file  Sigmoidoscopy: Not on file    Screening Current     Breast Cancer Screening Age: 40+ years old  Frequency: every 1-2 years  Not required if history of left and right mastectomy Mammogram: 01/06/2025    Screening Current   Cervical Cancer Screening Between the ages of 21-29, pap smear recommended once every 3 years.   Between the ages of 30-65, can perform pap smear with HPV co-testing every 5 years.   Recommendations may differ for women with a history of total hysterectomy, cervical cancer, or abnormal pap smears in past. Pap Smear: 01/22/2020    Screening Not Indicated   Hepatitis C Screening Once for adults born between 1945 and 1965  More frequently in  patients at high risk for Hepatitis C Hep C Antibody: 07/15/2022    Screening Current   Diabetes Screening 1-2 times per year if you're at risk for diabetes or have pre-diabetes Fasting glucose: 146 mg/dL (3/12/2025)  A1C: 6.5 % (3/12/2025)  Screening Not Indicated  History Diabetes   Cholesterol Screening Once every 5 years if you don't have a lipid disorder. May order more often based on risk factors. Lipid panel: 02/07/2025    Screening Not Indicated  History Lipid Disorder     Other Preventive Screenings Covered by Medicare:  Abdominal Aortic Aneurysm (AAA) Screening: covered once if your at risk. You're considered to be at risk if you have a family history of AAA.  Lung Cancer Screening: covers low dose CT scan once per year if you meet all of the following conditions: (1) Age 55-77; (2) No signs or symptoms of lung cancer; (3) Current smoker or have quit smoking within the last 15 years; (4) You have a tobacco smoking history of at least 20 pack years (packs per day multiplied by number of years you smoked); (5) You get a written order from a healthcare provider.  Glaucoma Screening: covered annually if you're considered high risk: (1) You have diabetes OR (2) Family history of glaucoma OR (3)  aged 50 and older OR (4)  American aged 65 and older  Osteoporosis Screening: covered every 2 years if you meet one of the following conditions: (1) You're estrogen deficient and at risk for osteoporosis based off medical history and other findings; (2) Have a vertebral abnormality; (3) On glucocorticoid therapy for more than 3 months; (4) Have primary hyperparathyroidism; (5) On osteoporosis medications and need to assess response to drug therapy.   Last bone density test (DXA Scan): 10/01/2024.  HIV Screening: covered annually if you're between the age of 15-65. Also covered annually if you are younger than 15 and older than 65 with risk factors for HIV infection. For pregnant patients, it is  covered up to 3 times per pregnancy.    Immunizations:  Immunization Recommendations   Influenza Vaccine Annual influenza vaccination during flu season is recommended for all persons aged >= 6 months who do not have contraindications   Pneumococcal Vaccine   * Pneumococcal conjugate vaccine = PCV13 (Prevnar 13), PCV15 (Vaxneuvance), PCV20 (Prevnar 20)  * Pneumococcal polysaccharide vaccine = PPSV23 (Pneumovax) Adults 19-63 yo with certain risk factors or if 65+ yo  If never received any pneumonia vaccine: recommend Prevnar 20 (PCV20)  Give PCV20 if previously received 1 dose of PCV13 or PPSV23   Hepatitis B Vaccine 3 dose series if at intermediate or high risk (ex: diabetes, end stage renal disease, liver disease)   Respiratory syncytial virus (RSV) Vaccine - COVERED BY MEDICARE PART D  * RSVPreF3 (Arexvy) CDC recommends that adults 60 years of age and older may receive a single dose of RSV vaccine using shared clinical decision-making (SCDM)   Tetanus (Td) Vaccine - COST NOT COVERED BY MEDICARE PART B Following completion of primary series, a booster dose should be given every 10 years to maintain immunity against tetanus. Td may also be given as tetanus wound prophylaxis.   Tdap Vaccine - COST NOT COVERED BY MEDICARE PART B Recommended at least once for all adults. For pregnant patients, recommended with each pregnancy.   Shingles Vaccine (Shingrix) - COST NOT COVERED BY MEDICARE PART B  2 shot series recommended in those 19 years and older who have or will have weakened immune systems or those 50 years and older     Health Maintenance Due:      Topic Date Due   • Breast Cancer Screening: Mammogram  01/06/2026   • Colorectal Cancer Screening  08/25/2027   • Hepatitis C Screening  Completed     Immunizations Due:      Topic Date Due   • Pneumococcal Vaccine: 65+ Years (2 of 2 - PCV) 10/13/2021     Advance Directives   What are advance directives?  Advance directives are legal documents that state your wishes and  plans for medical care. These plans are made ahead of time in case you lose your ability to make decisions for yourself. Advance directives can apply to any medical decision, such as the treatments you want, and if you want to donate organs.   What are the types of advance directives?  There are many types of advance directives, and each state has rules about how to use them. You may choose a combination of any of the following:  Living will:  This is a written record of the treatment you want. You can also choose which treatments you do not want, which to limit, and which to stop at a certain time. This includes surgery, medicine, IV fluid, and tube feedings.   Durable power of  for healthcare (DPAHC):  This is a written record that states who you want to make healthcare choices for you when you are unable to make them for yourself. This person, called a proxy, is usually a family member or a friend. You may choose more than 1 proxy.  Do not resuscitate (DNR) order:  A DNR order is used in case your heart stops beating or you stop breathing. It is a request not to have certain forms of treatment, such as CPR. A DNR order may be included in other types of advance directives.  Medical directive:  This covers the care that you want if you are in a coma, near death, or unable to make decisions for yourself. You can list the treatments you want for each condition. Treatment may include pain medicine, surgery, blood transfusions, dialysis, IV or tube feedings, and a ventilator (breathing machine).  Values history:  This document has questions about your views, beliefs, and how you feel and think about life. This information can help others choose the care that you would choose.  Why are advance directives important?  An advance directive helps you control your care. Although spoken wishes may be used, it is better to have your wishes written down. Spoken wishes can be misunderstood, or not followed. Treatments  may be given even if you do not want them. An advance directive may make it easier for your family to make difficult choices about your care.   Weight Management   Why it is important to manage your weight:  Being overweight increases your risk of health conditions such as heart disease, high blood pressure, type 2 diabetes, and certain types of cancer. It can also increase your risk for osteoarthritis, sleep apnea, and other respiratory problems. Aim for a slow, steady weight loss. Even a small amount of weight loss can lower your risk of health problems.  How to lose weight safely:  A safe and healthy way to lose weight is to eat fewer calories and get regular exercise. You can lose up about 1 pound a week by decreasing the number of calories you eat by 500 calories each day.   Healthy meal plan for weight management:  A healthy meal plan includes a variety of foods, contains fewer calories, and helps you stay healthy. A healthy meal plan includes the following:  Eat whole-grain foods more often.  A healthy meal plan should contain fiber. Fiber is the part of grains, fruits, and vegetables that is not broken down by your body. Whole-grain foods are healthy and provide extra fiber in your diet. Some examples of whole-grain foods are whole-wheat breads and pastas, oatmeal, brown rice, and bulgur.  Eat a variety of vegetables every day.  Include dark, leafy greens such as spinach, kale, ela greens, and mustard greens. Eat yellow and orange vegetables such as carrots, sweet potatoes, and winter squash.   Eat a variety of fruits every day.  Choose fresh or canned fruit (canned in its own juice or light syrup) instead of juice. Fruit juice has very little or no fiber.  Eat low-fat dairy foods.  Drink fat-free (skim) milk or 1% milk. Eat fat-free yogurt and low-fat cottage cheese. Try low-fat cheeses such as mozzarella and other reduced-fat cheeses.  Choose meat and other protein foods that are low in fat.  Choose  beans or other legumes such as split peas or lentils. Choose fish, skinless poultry (chicken or turkey), or lean cuts of red meat (beef or pork). Before you cook meat or poultry, cut off any visible fat.   Use less fat and oil.  Try baking foods instead of frying them. Add less fat, such as margarine, sour cream, regular salad dressing and mayonnaise to foods. Eat fewer high-fat foods. Some examples of high-fat foods include french fries, doughnuts, ice cream, and cakes.  Eat fewer sweets.  Limit foods and drinks that are high in sugar. This includes candy, cookies, regular soda, and sweetened drinks.  Exercise:  Exercise at least 30 minutes per day on most days of the week. Some examples of exercise include walking, biking, dancing, and swimming. You can also fit in more physical activity by taking the stairs instead of the elevator or parking farther away from stores. Ask your healthcare provider about the best exercise plan for you.      © Copyright [employer] 2018 Information is for End User's use only and may not be sold, redistributed or otherwise used for commercial purposes. All illustrations and images included in CareNotes® are the copyrighted property of [employer] or [employer]

## 2025-03-25 ENCOUNTER — APPOINTMENT (EMERGENCY)
Dept: RADIOLOGY | Facility: HOSPITAL | Age: 70
End: 2025-03-25
Payer: COMMERCIAL

## 2025-03-25 ENCOUNTER — HOSPITAL ENCOUNTER (EMERGENCY)
Facility: HOSPITAL | Age: 70
Discharge: HOME | End: 2025-03-25
Attending: EMERGENCY MEDICINE
Payer: COMMERCIAL

## 2025-03-25 ENCOUNTER — APPOINTMENT (EMERGENCY)
Dept: CT IMAGING | Facility: HOSPITAL | Age: 70
End: 2025-03-25
Payer: COMMERCIAL

## 2025-03-25 VITALS
DIASTOLIC BLOOD PRESSURE: 71 MMHG | TEMPERATURE: 98.5 F | SYSTOLIC BLOOD PRESSURE: 122 MMHG | OXYGEN SATURATION: 98 % | HEART RATE: 65 BPM | RESPIRATION RATE: 18 BRPM

## 2025-03-25 DIAGNOSIS — Z98.84 HISTORY OF ROUX-EN-Y GASTRIC BYPASS: ICD-10-CM

## 2025-03-25 DIAGNOSIS — R10.13 EPIGASTRIC PAIN: Primary | ICD-10-CM

## 2025-03-25 LAB
2HR DELTA HS TROPONIN: 0 NG/L
ALBUMIN SERPL BCG-MCNC: 4.6 G/DL (ref 3.5–5)
ALP SERPL-CCNC: 113 U/L (ref 34–104)
ALT SERPL W P-5'-P-CCNC: 46 U/L (ref 7–52)
ANION GAP SERPL CALCULATED.3IONS-SCNC: 12 MMOL/L (ref 4–13)
AST SERPL W P-5'-P-CCNC: 113 U/L (ref 13–39)
BASOPHILS # BLD AUTO: 0.02 THOUSANDS/ÂΜL (ref 0–0.1)
BASOPHILS NFR BLD AUTO: 0 % (ref 0–1)
BILIRUB SERPL-MCNC: 1.16 MG/DL (ref 0.2–1)
BUN SERPL-MCNC: 17 MG/DL (ref 5–25)
CALCIUM SERPL-MCNC: 9.9 MG/DL (ref 8.4–10.2)
CARDIAC TROPONIN I PNL SERPL HS: 4 NG/L (ref ?–50)
CARDIAC TROPONIN I PNL SERPL HS: 4 NG/L (ref ?–50)
CHLORIDE SERPL-SCNC: 99 MMOL/L (ref 96–108)
CO2 SERPL-SCNC: 25 MMOL/L (ref 21–32)
CREAT SERPL-MCNC: 1.14 MG/DL (ref 0.6–1.3)
EOSINOPHIL # BLD AUTO: 0.18 THOUSAND/ÂΜL (ref 0–0.61)
EOSINOPHIL NFR BLD AUTO: 2 % (ref 0–6)
ERYTHROCYTE [DISTWIDTH] IN BLOOD BY AUTOMATED COUNT: 12.9 % (ref 11.6–15.1)
GFR SERPL CREATININE-BSD FRML MDRD: 49 ML/MIN/1.73SQ M
GLUCOSE SERPL-MCNC: 198 MG/DL (ref 65–140)
HCT VFR BLD AUTO: 44.7 % (ref 34.8–46.1)
HGB BLD-MCNC: 14.7 G/DL (ref 11.5–15.4)
HOLD SPECIMEN: NORMAL
IMM GRANULOCYTES # BLD AUTO: 0.04 THOUSAND/UL (ref 0–0.2)
IMM GRANULOCYTES NFR BLD AUTO: 0 % (ref 0–2)
LIPASE SERPL-CCNC: 145 U/L (ref 11–82)
LYMPHOCYTES # BLD AUTO: 2.53 THOUSANDS/ÂΜL (ref 0.6–4.47)
LYMPHOCYTES NFR BLD AUTO: 25 % (ref 14–44)
MAGNESIUM SERPL-MCNC: 1.6 MG/DL (ref 1.9–2.7)
MCH RBC QN AUTO: 28.1 PG (ref 26.8–34.3)
MCHC RBC AUTO-ENTMCNC: 32.9 G/DL (ref 31.4–37.4)
MCV RBC AUTO: 86 FL (ref 82–98)
MONOCYTES # BLD AUTO: 0.82 THOUSAND/ÂΜL (ref 0.17–1.22)
MONOCYTES NFR BLD AUTO: 8 % (ref 4–12)
NEUTROPHILS # BLD AUTO: 6.53 THOUSANDS/ÂΜL (ref 1.85–7.62)
NEUTS SEG NFR BLD AUTO: 65 % (ref 43–75)
NRBC BLD AUTO-RTO: 0 /100 WBCS
PHOSPHATE SERPL-MCNC: 3.5 MG/DL (ref 2.3–4.1)
PLATELET # BLD AUTO: 348 THOUSANDS/UL (ref 149–390)
PMV BLD AUTO: 9.1 FL (ref 8.9–12.7)
POTASSIUM SERPL-SCNC: 3.9 MMOL/L (ref 3.5–5.3)
PROT SERPL-MCNC: 7.6 G/DL (ref 6.4–8.4)
RBC # BLD AUTO: 5.23 MILLION/UL (ref 3.81–5.12)
SODIUM SERPL-SCNC: 136 MMOL/L (ref 135–147)
WBC # BLD AUTO: 10.12 THOUSAND/UL (ref 4.31–10.16)

## 2025-03-25 PROCEDURE — 85025 COMPLETE CBC W/AUTO DIFF WBC: CPT

## 2025-03-25 PROCEDURE — 36415 COLL VENOUS BLD VENIPUNCTURE: CPT

## 2025-03-25 PROCEDURE — 99285 EMERGENCY DEPT VISIT HI MDM: CPT | Performed by: EMERGENCY MEDICINE

## 2025-03-25 PROCEDURE — 99285 EMERGENCY DEPT VISIT HI MDM: CPT

## 2025-03-25 PROCEDURE — 93005 ELECTROCARDIOGRAM TRACING: CPT

## 2025-03-25 PROCEDURE — 71046 X-RAY EXAM CHEST 2 VIEWS: CPT

## 2025-03-25 PROCEDURE — 83690 ASSAY OF LIPASE: CPT | Performed by: EMERGENCY MEDICINE

## 2025-03-25 PROCEDURE — 83735 ASSAY OF MAGNESIUM: CPT | Performed by: EMERGENCY MEDICINE

## 2025-03-25 PROCEDURE — 96375 TX/PRO/DX INJ NEW DRUG ADDON: CPT

## 2025-03-25 PROCEDURE — 80053 COMPREHEN METABOLIC PANEL: CPT

## 2025-03-25 PROCEDURE — 74177 CT ABD & PELVIS W/CONTRAST: CPT

## 2025-03-25 PROCEDURE — 84484 ASSAY OF TROPONIN QUANT: CPT

## 2025-03-25 PROCEDURE — 84100 ASSAY OF PHOSPHORUS: CPT | Performed by: EMERGENCY MEDICINE

## 2025-03-25 PROCEDURE — 96365 THER/PROPH/DIAG IV INF INIT: CPT

## 2025-03-25 RX ORDER — OMEPRAZOLE 20 MG/1
20 CAPSULE, DELAYED RELEASE ORAL DAILY
Qty: 30 CAPSULE | Refills: 0 | Status: SHIPPED | OUTPATIENT
Start: 2025-03-25 | End: 2025-04-24

## 2025-03-25 RX ORDER — SUCRALFATE 1 G/1
1 TABLET ORAL ONCE
Status: COMPLETED | OUTPATIENT
Start: 2025-03-25 | End: 2025-03-25

## 2025-03-25 RX ORDER — FENTANYL CITRATE 50 UG/ML
50 INJECTION, SOLUTION INTRAMUSCULAR; INTRAVENOUS ONCE
Refills: 0 | Status: COMPLETED | OUTPATIENT
Start: 2025-03-25 | End: 2025-03-25

## 2025-03-25 RX ORDER — ONDANSETRON 2 MG/ML
4 INJECTION INTRAMUSCULAR; INTRAVENOUS ONCE AS NEEDED
Status: COMPLETED | OUTPATIENT
Start: 2025-03-25 | End: 2025-03-25

## 2025-03-25 RX ORDER — MAGNESIUM SULFATE HEPTAHYDRATE 40 MG/ML
2 INJECTION, SOLUTION INTRAVENOUS ONCE
Status: COMPLETED | OUTPATIENT
Start: 2025-03-25 | End: 2025-03-25

## 2025-03-25 RX ORDER — PANTOPRAZOLE SODIUM 40 MG/10ML
40 INJECTION, POWDER, LYOPHILIZED, FOR SOLUTION INTRAVENOUS ONCE
Status: COMPLETED | OUTPATIENT
Start: 2025-03-25 | End: 2025-03-25

## 2025-03-25 RX ADMIN — PANTOPRAZOLE SODIUM 40 MG: 40 INJECTION, POWDER, FOR SOLUTION INTRAVENOUS at 14:36

## 2025-03-25 RX ADMIN — ONDANSETRON 4 MG: 2 INJECTION INTRAMUSCULAR; INTRAVENOUS at 14:36

## 2025-03-25 RX ADMIN — IOHEXOL 100 ML: 350 INJECTION, SOLUTION INTRAVENOUS at 16:05

## 2025-03-25 RX ADMIN — MAGNESIUM SULFATE HEPTAHYDRATE 2 G: 40 INJECTION, SOLUTION INTRAVENOUS at 14:36

## 2025-03-25 RX ADMIN — SUCRALFATE 1 G: 1 TABLET ORAL at 14:36

## 2025-03-25 RX ADMIN — FENTANYL CITRATE 50 MCG: 50 INJECTION INTRAMUSCULAR; INTRAVENOUS at 13:45

## 2025-03-25 NOTE — Clinical Note
Lizabeth Santiago was seen and treated in our emergency department on 3/25/2025.                Diagnosis:     Lizabeth  may return to work on return date.    She may return on this date: 03/27/2025         If you have any questions or concerns, please don't hesitate to call.      Gael Herman, DO    ______________________________           _______________          _______________  Hospital Representative                              Date                                Time

## 2025-03-25 NOTE — ED NOTES
Oral contrast provided to patient. Drinking started at this time.      Venkat Gauthier RN  03/25/25 [id number]       Venkat Gauthier RN  03/25/25 [id number]

## 2025-03-25 NOTE — ED PROVIDER NOTES
Time reflects when diagnosis was documented in both MDM as applicable and the Disposition within this note       Time User Action Codes Description Comment    3/25/2025  4:30 PM Gael Herman Add [R10.13] Epigastric pain     3/25/2025  5:13 PM Gael Herman [Z98.84] History of Hima-en-Y gastric bypass           ED Disposition       ED Disposition   Discharge    Condition   Stable    Date/Time   Tue Mar 25, 2025  4:30 PM    Comment   Lizabeth Santiago discharge to home/self care.                   Assessment & Plan       Medical Decision Making  69-year-old female with a past surgical history of gastric bypass Hima-en-Y and cholecystectomy presents for evaluation of acute onset nonradiating severe stabbing epigastric pain which onset at approximately 0730 hrs. this morning.   No nausea or vomiting.  No shortness of breath.  No fever or chills.  No lower extremity edema or swelling.  No ripping or tearing type pain.  The pain is localized only to the epigastrium without any obvious alleviating or exacerbating factors.  No home remedies for the pain.  On initial exam, the patient appears uncomfortable with noted hypertension.  Not ill or toxic appearing.  After analgesia, the patient felt better and blood pressure improved drastically.    Labs, symptom management, IV fluids as needed, CT abdomen/pelvis with contrast, EKG, chest x-ray, disposition as appropriate.    Amount and/or Complexity of Data Reviewed  Labs: ordered. Decision-making details documented in ED Course.  Radiology: ordered and independent interpretation performed. Decision-making details documented in ED Course.  ECG/medicine tests: ordered and independent interpretation performed. Decision-making details documented in ED Course.    Risk  Prescription drug management.    1715 hrs.: Patient reassessed.  Feeling better.  Reviewed all labs and imaging. Repeat EKG is unchanged.  Will discharge home with omeprazole prescription.  Gastroenterology  follow-up.   Return precautions provided.         Medications   iohexol (OMNIPAQUE) 240 MG/ML solution 50 mL (has no administration in time range)   fentaNYL injection 50 mcg (50 mcg Intravenous Given 3/25/25 1345)   pantoprazole (PROTONIX) injection 40 mg (40 mg Intravenous Given 3/25/25 1436)   sucralfate (CARAFATE) tablet 1 g (1 g Oral Given 3/25/25 1436)   magnesium sulfate 2 g/50 mL IVPB (premix) 2 g (0 g Intravenous Stopped 3/25/25 1538)   ondansetron (ZOFRAN) injection 4 mg (4 mg Intravenous Given 3/25/25 1436)   iohexol (OMNIPAQUE) 350 MG/ML injection (MULTI-DOSE) 100 mL (100 mL Intravenous Given 3/25/25 1605)       ED Risk Strat Scores   HEART Risk Score      Flowsheet Row Most Recent Value   Heart Score Risk Calculator    History 0 Filed at: 03/25/2025 1620   ECG 0 Filed at: 03/25/2025 1620   Age 2 Filed at: 03/25/2025 1620   Risk Factors 1 Filed at: 03/25/2025 1620   Troponin 0 Filed at: 03/25/2025 1620   HEART Score 3 Filed at: 03/25/2025 1620          HEART Risk Score      Flowsheet Row Most Recent Value   Heart Score Risk Calculator    History 0 Filed at: 03/25/2025 1620   ECG 0 Filed at: 03/25/2025 1620   Age 2 Filed at: 03/25/2025 1620   Risk Factors 1 Filed at: 03/25/2025 1620   Troponin 0 Filed at: 03/25/2025 1620   HEART Score 3 Filed at: 03/25/2025 1620                              SBIRT 20yo+      Flowsheet Row Most Recent Value   Initial Alcohol Screen: US AUDIT-C     1. How often do you have a drink containing alcohol? 0 Filed at: 03/25/2025 1331   2. How many drinks containing alcohol do you have on a typical day you are drinking?  0 Filed at: 03/25/2025 1331   3a. Male UNDER 65: How often do you have five or more drinks on one occasion? 0 Filed at: 03/25/2025 1331   3b. FEMALE Any Age, or MALE 65+: How often do you have 4 or more drinks on one occassion? 0 Filed at: 03/25/2025 1331   Audit-C Score 0 Filed at: 03/25/2025 1331   DONNA: How many times in the past year have you...    Used an  illegal drug or used a prescription medication for non-medical reasons? Never Filed at: 03/25/2025 4671                            History of Present Illness       Chief Complaint   Patient presents with    Chest Pain     Patient reports mid-sternal CP that started this morning, getting worse over time.        Past Medical History:   Diagnosis Date    Anemia     Anesthesia complication     Facial swelling    Diabetes mellitus (HCC)     Disease of thyroid gland     Hyperlipidemia     Hypertension     Hypothyroidism     Morbid obesity (HCC)     Pancreatitis 09/1997    Sleep apnea     no cpap machine used    Type 2 diabetes mellitus (HCC)     Vitamin D deficiency     Wears glasses       Past Surgical History:   Procedure Laterality Date    BREAST EXCISIONAL BIOPSY Left 07/2001    benign    CATARACT EXTRACTION Left 06/2024    CATARACT EXTRACTION Right 07/2024    CHOLECYSTECTOMY  04/2013    COLONOSCOPY      EGD      GASTRIC BYPASS  05/2013    JOINT REPLACEMENT      WY EXC EXCSV SKN ABD INFRAUMBILICAL PANNICULECTOMY N/A 02/15/2023    Procedure: BODY-LIFT;  Surgeon: Riley Celeste MD;  Location:  MAIN OR;  Service: Plastics    WY EXCISION EXCESSIVE SKIN & SUBQ TISSUE ARM Bilateral 09/26/2023    Procedure: BRACHIOPLASTY;  Surgeon: Riley Celeste MD;  Location:  MAIN OR;  Service: Plastics    WY SUCTION ASSISTED LIPECTOMY LOWER EXTREMITY Bilateral 09/26/2023    Procedure: LIPOSUCTION OF LATERAL & MEDIAL THIGHS;  Surgeon: Riley Celeste MD;  Location:  MAIN OR;  Service: Plastics    TONSILLECTOMY      TOTAL KNEE ARTHROPLASTY Left 2013      Family History   Problem Relation Age of Onset    Breast cancer Mother     Lung cancer Mother     Brain cancer Mother     Heart attack Father     Stroke Sister     No Known Problems Sister     No Known Problems Sister     No Known Problems Sister     No Known Problems Sister     No Known Problems Daughter     No Known Problems Maternal Grandmother     No Known Problems Maternal  Grandfather     Cancer Paternal Grandmother         unknown origin     Cancer Paternal Grandfather         unknown origin     Breast cancer Maternal Aunt     Breast cancer Maternal Aunt     Cancer Paternal Aunt         unknown origin    Cancer Paternal Aunt         unknown origin    Cancer Paternal Aunt         unknown origin    Lung cancer Paternal Aunt       Social History     Tobacco Use    Smoking status: Never     Passive exposure: Never    Smokeless tobacco: Never   Vaping Use    Vaping status: Never Used   Substance Use Topics    Alcohol use: Yes     Comment: rarely    Drug use: Never      E-Cigarette/Vaping    E-Cigarette Use Never User       E-Cigarette/Vaping Substances    Nicotine No     THC No     CBD No     Flavoring No     Other No     Unknown No       I have reviewed and agree with the history as documented.     69-year-old female with a past surgical history of gastric bypass Hima-en-Y and cholecystectomy presents for evaluation of acute onset nonradiating severe stabbing epigastric pain which onset at approximately 0730 hrs. this morning.   No nausea or vomiting.  No shortness of breath.  No fever or chills.  No lower extremity edema or swelling.  No ripping or tearing type pain.  The pain is localized only to the epigastrium without any obvious alleviating or exacerbating factors.  No home remedies for the pain.  On initial exam, the patient appears uncomfortable with noted hypertension.  Not ill or toxic appearing.  After analgesia, the patient felt better and blood pressure improved drastically.          Review of Systems   Constitutional:  Negative for activity change, appetite change, chills, diaphoresis, fatigue and fever.   HENT:  Negative for dental problem, ear pain, sore throat, trouble swallowing and voice change.    Eyes:  Negative for pain and visual disturbance.   Respiratory:  Negative for cough, chest tightness, shortness of breath and wheezing.    Cardiovascular:  Negative for chest  pain, palpitations and leg swelling.   Gastrointestinal:  Positive for abdominal pain. Negative for anal bleeding, blood in stool, diarrhea, nausea, rectal pain and vomiting.   Endocrine: Negative for polydipsia, polyphagia and polyuria.   Genitourinary:  Negative for difficulty urinating, dysuria, flank pain, frequency, hematuria and urgency.   Musculoskeletal:  Negative for back pain, joint swelling, myalgias, neck pain and neck stiffness.   Skin:  Negative for pallor, rash and wound.   Neurological:  Negative for dizziness, facial asymmetry, speech difficulty, weakness, light-headedness, numbness and headaches.   Hematological:  Negative for adenopathy.   Psychiatric/Behavioral:  Negative for agitation. The patient is not nervous/anxious.    All other systems reviewed and are negative.          Objective       ED Triage Vitals   Temperature Pulse Blood Pressure Respirations SpO2 Patient Position - Orthostatic VS   03/25/25 1331 03/25/25 1331 03/25/25 1331 03/25/25 1331 03/25/25 1331 03/25/25 1432   98 °F (36.7 °C) 100 (!) 215/102 22 99 % Sitting      Temp Source Heart Rate Source BP Location FiO2 (%) Pain Score    03/25/25 1331 03/25/25 1331 03/25/25 1331 -- 03/25/25 1331    Temporal Monitor Right arm  10 - Worst Possible Pain      Vitals      Date and Time Temp Pulse SpO2 Resp BP Pain Score FACES Pain Rating User   03/25/25 1530 98.2 °F (36.8 °C) 66 98 % 16 129/71 1 -- BW   03/25/25 1432 98 °F (36.7 °C) 70 97 % 17 121/69 1 -- AB   03/25/25 1422 -- -- -- -- -- 2 -- MB   03/25/25 1356 -- 79 -- -- 125/63 -- -- JT   03/25/25 1331 98 °F (36.7 °C) 100 99 % 22 215/102 10 - Worst Possible Pain -- SK            Physical Exam  Vitals and nursing note reviewed.   Constitutional:       General: She is not in acute distress.     Appearance: She is well-developed. She is not ill-appearing, toxic-appearing or diaphoretic.   HENT:      Head: Normocephalic and atraumatic.      Right Ear: External ear normal.      Left Ear:  External ear normal.      Nose: Nose normal. No congestion or rhinorrhea.      Mouth/Throat:      Mouth: Mucous membranes are moist.      Pharynx: No oropharyngeal exudate or posterior oropharyngeal erythema.   Eyes:      General: No visual field deficit or scleral icterus.        Right eye: No discharge.         Left eye: No discharge.      Extraocular Movements: Extraocular movements intact.      Conjunctiva/sclera: Conjunctivae normal.      Pupils: Pupils are equal, round, and reactive to light.   Neck:      Thyroid: No thyromegaly.      Vascular: No JVD.      Trachea: No tracheal deviation.   Cardiovascular:      Rate and Rhythm: Normal rate and regular rhythm.      Pulses: Normal pulses.      Heart sounds: Normal heart sounds, S1 normal and S2 normal. No murmur heard.     No friction rub. No gallop.   Pulmonary:      Effort: Pulmonary effort is normal. No accessory muscle usage, respiratory distress or retractions.      Breath sounds: Normal breath sounds and air entry. No stridor or decreased air movement. No decreased breath sounds, wheezing, rhonchi or rales.   Chest:      Chest wall: No tenderness.   Abdominal:      General: There is no distension.      Palpations: Abdomen is soft. There is no mass or pulsatile mass.      Tenderness: There is abdominal tenderness in the epigastric area. There is no guarding or rebound.      Hernia: No hernia is present.   Musculoskeletal:         General: No swelling, tenderness or deformity. Normal range of motion.      Cervical back: Normal range of motion and neck supple.      Right lower leg: No edema.      Left lower leg: No edema.   Lymphadenopathy:      Cervical: No cervical adenopathy.   Skin:     General: Skin is warm and dry.      Capillary Refill: Capillary refill takes less than 2 seconds.      Coloration: Skin is not pale.      Findings: No erythema or rash.   Neurological:      General: No focal deficit present.      Mental Status: She is alert and oriented to  person, place, and time.      GCS: GCS eye subscore is 4. GCS verbal subscore is 5. GCS motor subscore is 6.      Cranial Nerves: Cranial nerves 2-12 are intact. No cranial nerve deficit, dysarthria or facial asymmetry.      Sensory: Sensation is intact. No sensory deficit.      Motor: Motor function is intact. No weakness, tremor, atrophy, abnormal muscle tone or seizure activity.      Coordination: Coordination is intact. Coordination normal.      Gait: Gait is intact.      Deep Tendon Reflexes: Reflexes are normal and symmetric. Reflexes normal.   Psychiatric:         Behavior: Behavior normal.         Results Reviewed       Procedure Component Value Units Date/Time    [place] draw [[id number]] Collected: 03/25/25 1334    Lab Status: Final result Specimen: Blood from Arm, Left Updated: 03/25/25 1701    Narrative:      The following orders were created for panel order [place] draw.  Procedure                               Abnormality         Status                     ---------                               -----------         ------                     Light Blue Top on hold[[id number]]                           Final result               Lavender Top 3 ml on hold[[id number]]                        Final result                 Please view results for these tests on the individual orders.    HS Troponin I 2hr [[id number]]  (Normal) Collected: 03/25/25 1547    Lab Status: Final result Specimen: Blood from Arm, Left Updated: 03/25/25 1618     hs TnI 2hr 4 ng/L      Delta 2hr hsTnI 0 ng/L     Lipase [[id number]]  (Abnormal) Collected: 03/25/25 1334    Lab Status: Final result Specimen: Blood from Arm, Left Updated: 03/25/25 1432     Lipase 145 u/L     Phosphorus [[id number]]  (Normal) Collected: 03/25/25 1334    Lab Status: Final result Specimen: Blood from Arm, Left Updated: 03/25/25 1432     Phosphorus 3.5 mg/dL     Magnesium [[id number]]  (Abnormal) Collected: 03/25/25 1334    Lab Status: Final result Specimen: Blood  from Arm, Left Updated: 03/25/25 1432     Magnesium 1.6 mg/dL     HS Troponin 0hr (reflex protocol) [[id number]]  (Normal) Collected: 03/25/25 1334    Lab Status: Final result Specimen: Blood from Arm, Left Updated: 03/25/25 1407     hs TnI 0hr 4 ng/L     Comprehensive metabolic panel [[id number]]  (Abnormal) Collected: 03/25/25 1334    Lab Status: Final result Specimen: Blood from Arm, Left Updated: 03/25/25 1401     Sodium 136 mmol/L      Potassium 3.9 mmol/L      Chloride 99 mmol/L      CO2 25 mmol/L      ANION GAP 12 mmol/L      BUN 17 mg/dL      Creatinine 1.14 mg/dL      Glucose 198 mg/dL      Calcium 9.9 mg/dL       U/L      ALT 46 U/L      Alkaline Phosphatase 113 U/L      Total Protein 7.6 g/dL      Albumin 4.6 g/dL      Total Bilirubin 1.16 mg/dL      eGFR 49 ml/min/1.73sq m     Narrative:      [employer] guidelines for Chronic Kidney Disease (CKD):     Stage 1 with normal or high GFR (GFR > 90 mL/min/1.73 square meters)    Stage 2 Mild CKD (GFR = 60-89 mL/min/1.73 square meters)    Stage 3A Moderate CKD (GFR = 45-59 mL/min/1.73 square meters)    Stage 3B Moderate CKD (GFR = 30-44 mL/min/1.73 square meters)    Stage 4 Severe CKD (GFR = 15-29 mL/min/1.73 square meters)    Stage 5 End Stage CKD (GFR <15 mL/min/1.73 square meters)  Note: GFR calculation is accurate only with a steady state creatinine    CBC and differential [[id number]]  (Abnormal) Collected: 03/25/25 1334    Lab Status: Final result Specimen: Blood from Arm, Left Updated: 03/25/25 1345     WBC 10.12 Thousand/uL      RBC 5.23 Million/uL      Hemoglobin 14.7 g/dL      Hematocrit 44.7 %      MCV 86 fL      MCH 28.1 pg      MCHC 32.9 g/dL      RDW 12.9 %      MPV 9.1 fL      Platelets 348 Thousands/uL      nRBC 0 /100 WBCs      Segmented % 65 %      Immature Grans % 0 %      Lymphocytes % 25 %      Monocytes % 8 %      Eosinophils Relative 2 %      Basophils Relative 0 %      Absolute Neutrophils 6.53 Thousands/µL       Absolute Immature Grans 0.04 Thousand/uL      Absolute Lymphocytes 2.53 Thousands/µL      Absolute Monocytes 0.82 Thousand/µL      Eosinophils Absolute 0.18 Thousand/µL      Basophils Absolute 0.02 Thousands/µL             CT abdomen pelvis with contrast   Final Interpretation by E. Alec Schoenberger, MD (03/25 1622)      No acute intra-abdominal pathology.         Workstation performed: [id number]         XR chest 2 views   ED Interpretation by Gael Herman DO (03/25 1535)   No obvious acute abnormality.  No obvious subdiaphragmatic free air.      Final Interpretation by Timothy Lane MD (03/25 1624)      No acute cardiopulmonary disease.            Workstation performed: [id number]             ECG 12 Lead Documentation Only    Date/Time: 3/25/2025 1:37 PM    Performed by: Gael Herman DO  Authorized by: Gael Herman DO    Indications / Diagnosis:  Epigastric pain  ECG reviewed by me, the ED Provider: yes    Patient location:  ED  Previous ECG:     Previous ECG:  Compared to current    Comparison ECG info:  10/24/22    Similarity:  Changes noted    Comparison to cardiac monitor: Yes    Interpretation:     Interpretation: abnormal    Rate:     ECG rate:  89    ECG rate assessment: normal    Rhythm:     Rhythm: sinus rhythm    Ectopy:     Ectopy: none    QRS:     QRS axis:  Left    QRS intervals:  Normal  Conduction:     Conduction: abnormal      Abnormal conduction: complete RBBB and bifascicular block    ST segments:     ST segments:  Non-specific  T waves:     T waves: inverted      Inverted:  III  Other findings:     Other findings: prolonged qTc interval    ECG 12 Lead Documentation Only    Date/Time: 3/25/2025 5:09 PM    Performed by: Gael Herman DO  Authorized by: Gael Herman DO    Indications / Diagnosis:  Repeat  ECG reviewed by me, the ED Provider: yes    Patient location:  ED  Previous ECG:     Previous ECG:  Compared to current    Comparison ECG info:   3/25/25    Similarity:  No change    Comparison to cardiac monitor: Yes    Interpretation:     Interpretation: abnormal    Rate:     ECG rate:  68    ECG rate assessment: normal    Rhythm:     Rhythm: sinus rhythm    Ectopy:     Ectopy: none    QRS:     QRS axis:  Left    QRS intervals:  Normal  Conduction:     Conduction: abnormal      Abnormal conduction: complete RBBB and bifascicular block    ST segments:     ST segments:  Non-specific  T waves:     T waves: inverted      Inverted:  III      ED Medication and Procedure Management   Prior to Admission Medications   Prescriptions Last Dose Informant Patient Reported? Taking?   Blood Glucose Monitoring Suppl (OneTouch Verio Reflect) w/Device KIT   No No   Sig: Check blood sugars once daily. Please substitute with appropriate alternative as covered by patient's insurance. Dx: E11.65   Multiple Vitamin (multivitamin) tablet  Self Yes No   Sig: Take 1 tablet by mouth daily   OneTouch Delica Lancets 33G MISC   No No   Sig: Check blood sugars once daily. Please substitute with appropriate alternative as covered by patient's insurance. Dx: E11.65   amLODIPine (NORVASC) 5 mg tablet   No No   Sig: TAKE 1 TABLET (5 MG TOTAL) BY MOUTH DAILY.   b complex vitamins capsule  Self Yes No   Sig: Take 1 capsule by mouth daily   carvedilol (COREG) 6.25 mg tablet   No No   Sig: TAKE 1 TABLET BY MOUTH TWICE A DAY   ergocalciferol (VITAMIN D2) 50,000 units  Self No No   Sig: Take 1 capsule (50,000 Units total) by mouth once a week   ezetimibe (ZETIA) 10 mg tablet   No No   Sig: TAKE 1 TABLET BY MOUTH EVERY DAY   glucose blood (OneTouch Verio) test strip   No No   Sig: Check blood sugars once daily. Please substitute with appropriate alternative as covered by patient's insurance. Dx: E11.65   hydroCHLOROthiazide 25 mg tablet   No No   Sig: TAKE 1 TABLET (25 MG TOTAL) BY MOUTH DAILY.   levothyroxine 125 mcg tablet   No No   Sig: Take 1 tablet (125 mcg total) by mouth daily   metFORMIN  (GLUCOPHAGE-XR) 500 mg 24 hr tablet   No No   Sig: Take 4 tablets (2,000 mg total) by mouth daily with breakfast   semaglutide, 0.25 or 0.5 mg/dose, (Ozempic, 0.25 or 0.5 MG/DOSE,) 2 mg/3 mL injection pen   No No   Si.25 mg under the skin every 7 days for 4 doses (28 days), THEN 0.5 mg under the skin every 7 days      Facility-Administered Medications: None     Patient's Medications   Discharge Prescriptions    OMEPRAZOLE (PRILOSEC) 20 MG DELAYED RELEASE CAPSULE    Take 1 capsule (20 mg total) by mouth daily       Start Date: 3/25/2025 End Date: 2025       Order Dose: 20 mg       Quantity: 30 capsule    Refills: 0       ED SEPSIS DOCUMENTATION   Time reflects when diagnosis was documented in both MDM as applicable and the Disposition within this note       Time User Action Codes Description Comment    3/25/2025  4:30 PM Gael Herman [R10.13] Epigastric pain     3/25/2025  5:13 PM Gael Herman [Z98.84] History of Hima-en-Y gastric bypass                  Gael Herman, DO  25 1723       Gael Herman, DO  25 1724

## 2025-03-26 LAB
ATRIAL RATE: 68 BPM
ATRIAL RATE: 89 BPM
P AXIS: 70 DEGREES
P AXIS: 73 DEGREES
PR INTERVAL: 164 MS
PR INTERVAL: 172 MS
QRS AXIS: -42 DEGREES
QRS AXIS: -46 DEGREES
QRSD INTERVAL: 128 MS
QRSD INTERVAL: 132 MS
QT INTERVAL: 414 MS
QT INTERVAL: 450 MS
QTC INTERVAL: 478 MS
QTC INTERVAL: 503 MS
T WAVE AXIS: 17 DEGREES
T WAVE AXIS: 8 DEGREES
VENTRICULAR RATE: 68 BPM
VENTRICULAR RATE: 89 BPM

## 2025-03-26 PROCEDURE — 93010 ELECTROCARDIOGRAM REPORT: CPT | Performed by: INTERNAL MEDICINE

## 2025-03-31 ENCOUNTER — OFFICE VISIT (OUTPATIENT)
Dept: GASTROENTEROLOGY | Facility: CLINIC | Age: 70
End: 2025-03-31
Payer: COMMERCIAL

## 2025-03-31 VITALS
TEMPERATURE: 98.6 F | BODY MASS INDEX: 38.65 KG/M2 | DIASTOLIC BLOOD PRESSURE: 72 MMHG | HEIGHT: 65 IN | WEIGHT: 232 LBS | SYSTOLIC BLOOD PRESSURE: 110 MMHG

## 2025-03-31 DIAGNOSIS — Z98.84 HISTORY OF ROUX-EN-Y GASTRIC BYPASS: ICD-10-CM

## 2025-03-31 DIAGNOSIS — K21.9 GASTROESOPHAGEAL REFLUX DISEASE, UNSPECIFIED WHETHER ESOPHAGITIS PRESENT: ICD-10-CM

## 2025-03-31 DIAGNOSIS — R10.13 EPIGASTRIC PAIN: Primary | ICD-10-CM

## 2025-03-31 DIAGNOSIS — R79.89 ELEVATED LFTS: ICD-10-CM

## 2025-03-31 PROCEDURE — 99204 OFFICE O/P NEW MOD 45 MIN: CPT

## 2025-03-31 RX ORDER — SODIUM CHLORIDE, SODIUM LACTATE, POTASSIUM CHLORIDE, CALCIUM CHLORIDE 600; 310; 30; 20 MG/100ML; MG/100ML; MG/100ML; MG/100ML
125 INJECTION, SOLUTION INTRAVENOUS CONTINUOUS
Status: CANCELLED | OUTPATIENT
Start: 2025-03-31

## 2025-03-31 NOTE — H&P (VIEW-ONLY)
Name: Lizabeth Santiago      : 1955      MRN: [id number]  Encounter Provider: Heidi Bansal PA-C  Encounter Date: 3/31/2025   Encounter department: [facility] [place]  :  Assessment & Plan  Epigastric pain  Patient with severe episode of epigastric/chest pain requiring ER visit.  She had unremarkable cardiac workup, CT scan unremarkable.  Noted to have elevated , ALT 46, alk phos 113.  Will get EGD with biopsy to r/o esophagitis/gastritis/h pylori/PUD. Educated patient on GERD diet/lifestyle changes and provided handouts. Limit NSAIDs as able.   She recently started GLP1, could be side effect from medication.  Orders:    Ambulatory Referral to Gastroenterology    EGD; Future    History of Hima-en-Y gastric bypass  As above.  Orders:    Ambulatory Referral to Gastroenterology    Gastroesophageal reflux disease, unspecified whether esophagitis present  Continue omeprazole 20mg daily.   Rest of plan as above.  Orders:    EGD; Future    Elevated LFTs  Will repeat LFTs.  Orders:    Hepatic function panel; Future        History of Present Illness   HPI  Lizabeth Santiago is a 69 y.o. female with PMH of HTN, CULLEN, type 2 diabetes, hypothyroidism, history RYGB, HLD who presents for epigastric pain. Patient was in the ER 3/25/2025 for acute onset severe epigastric pain.    No other associated symptoms.  Labs showed WBC 10.1, hemoglobin 14.7, , ALT 46, alk phos 113, T. bili 1.16, lipase 145.  Troponin and EKG were unremarkable.  CT scan was unremarkable.  She was discharged on omeprazole 20 mg daily.    Patient reports she had severe chest/epigastric pain.  She then went to the ER and symptoms did slowly improve.  She is still having some ongoing pain, mostly after eating dinner.  She has been using Tums almost every day to help with heartburn.  Was started on omeprazole 20 mg daily.  No NSAID use.  She did recently start Ozempic 2 weeks ago.  Denies unintentional  weight loss, nausea, vomiting, dysphagia, odynophagia, change in bowel habits, melena, hematochezia.    Prior imaging/procedures:  CT A/P with IV contrast 3/25/2025: No acute abnormalities  Colonoscopy 8/2017: Mild diverticulosis, internal hemorrhoids    Review of Systems   Constitutional:  Negative for appetite change, chills and fever.   Respiratory:  Negative for shortness of breath.    Gastrointestinal:  Positive for abdominal pain. Negative for blood in stool, constipation, diarrhea, nausea and vomiting.     Medical History Reviewed by provider this encounter:     .  Current Outpatient Medications on File Prior to Visit   Medication Sig Dispense Refill    amLODIPine (NORVASC) 5 mg tablet TAKE 1 TABLET (5 MG TOTAL) BY MOUTH DAILY. 90 tablet 1    b complex vitamins capsule Take 1 capsule by mouth daily      Blood Glucose Monitoring Suppl (OneTouch Verio Reflect) w/Device KIT Check blood sugars once daily. Please substitute with appropriate alternative as covered by patient's insurance. Dx: E11.65 1 kit 0    carvedilol (COREG) 6.25 mg tablet TAKE 1 TABLET BY MOUTH TWICE A  tablet 1    ergocalciferol (VITAMIN D2) 50,000 units Take 1 capsule (50,000 Units total) by mouth once a week 12 capsule 2    ezetimibe (ZETIA) 10 mg tablet TAKE 1 TABLET BY MOUTH EVERY DAY 90 tablet 1    glucose blood (OneTouch Verio) test strip Check blood sugars once daily. Please substitute with appropriate alternative as covered by patient's insurance. Dx: E11.65 100 each 3    hydroCHLOROthiazide 25 mg tablet TAKE 1 TABLET (25 MG TOTAL) BY MOUTH DAILY. 90 tablet 1    levothyroxine 125 mcg tablet Take 1 tablet (125 mcg total) by mouth daily 90 tablet 1    metFORMIN (GLUCOPHAGE-XR) 500 mg 24 hr tablet Take 4 tablets (2,000 mg total) by mouth daily with breakfast 360 tablet 1    Multiple Vitamin (multivitamin) tablet Take 1 tablet by mouth daily      omeprazole (PriLOSEC) 20 mg delayed release capsule Take 1 capsule (20 mg total) by  "mouth daily 30 capsule 0    OneTouch Delica Lancets 33G MISC Check blood sugars once daily. Please substitute with appropriate alternative as covered by patient's insurance. Dx: E11.65 100 each 3    semaglutide, 0.25 or 0.5 mg/dose, (Ozempic, 0.25 or 0.5 MG/DOSE,) 2 mg/3 mL injection pen 0.25 mg under the skin every 7 days for 4 doses (28 days), THEN 0.5 mg under the skin every 7 days 9 mL 0     No current facility-administered medications on file prior to visit.      Social History     Tobacco Use    Smoking status: Never     Passive exposure: Never    Smokeless tobacco: Never   Vaping Use    Vaping status: Never Used   Substance and Sexual Activity    Alcohol use: Yes     Comment: rarely    Drug use: Never    Sexual activity: Yes     Partners: Male        Objective   /72 (BP Location: Right arm, Patient Position: Sitting, Cuff Size: Large)   Temp 98.6 °F (37 °C) (Tympanic)   Ht 5' 5\" (1.651 m)   Wt 105 kg (232 lb)   BMI 38.61 kg/m²      Physical Exam  Vitals reviewed.   Constitutional:       General: She is not in acute distress.     Appearance: She is not ill-appearing.   HENT:      Head: Normocephalic and atraumatic.   Cardiovascular:      Rate and Rhythm: Normal rate and regular rhythm.   Pulmonary:      Effort: Pulmonary effort is normal.      Breath sounds: Normal breath sounds.   Abdominal:      General: Abdomen is flat. Bowel sounds are normal. There is no distension.      Palpations: Abdomen is soft.      Tenderness: There is no abdominal tenderness.   Skin:     General: Skin is warm and dry.   Neurological:      Mental Status: She is alert. Mental status is at baseline.         "

## 2025-03-31 NOTE — PATIENT INSTRUCTIONS
Scheduled date of EGD(as of today): TBD  Physician performing EGD:TBD  Location of EGD:TBD  Instructions reviewed with patient by:NR  Clearances: NONE  Patient was given DM  instructions    Patient will call and schedule her procedure and follow up herself 2-3 months after the procedure    HOSPITAL SETTING

## 2025-03-31 NOTE — PROGRESS NOTES
Name: Lizabeth Santiago      : 1955      MRN: [id number]  Encounter Provider: Heidi Bansal PA-C  Encounter Date: 3/31/2025   Encounter department: [facility] [place]  :  Assessment & Plan  Epigastric pain  Patient with severe episode of epigastric/chest pain requiring ER visit.  She had unremarkable cardiac workup, CT scan unremarkable.  Noted to have elevated , ALT 46, alk phos 113.  Will get EGD with biopsy to r/o esophagitis/gastritis/h pylori/PUD. Educated patient on GERD diet/lifestyle changes and provided handouts. Limit NSAIDs as able.   She recently started GLP1, could be side effect from medication.  Orders:    Ambulatory Referral to Gastroenterology    EGD; Future    History of Hima-en-Y gastric bypass  As above.  Orders:    Ambulatory Referral to Gastroenterology    Gastroesophageal reflux disease, unspecified whether esophagitis present  Continue omeprazole 20mg daily.   Rest of plan as above.  Orders:    EGD; Future    Elevated LFTs  Will repeat LFTs.  Orders:    Hepatic function panel; Future        History of Present Illness   HPI  Lizabeth Santiago is a 69 y.o. female with PMH of HTN, CULLEN, type 2 diabetes, hypothyroidism, history RYGB, HLD who presents for epigastric pain. Patient was in the ER 3/25/2025 for acute onset severe epigastric pain.    No other associated symptoms.  Labs showed WBC 10.1, hemoglobin 14.7, , ALT 46, alk phos 113, T. bili 1.16, lipase 145.  Troponin and EKG were unremarkable.  CT scan was unremarkable.  She was discharged on omeprazole 20 mg daily.    Patient reports she had severe chest/epigastric pain.  She then went to the ER and symptoms did slowly improve.  She is still having some ongoing pain, mostly after eating dinner.  She has been using Tums almost every day to help with heartburn.  Was started on omeprazole 20 mg daily.  No NSAID use.  She did recently start Ozempic 2 weeks ago.  Denies unintentional  weight loss, nausea, vomiting, dysphagia, odynophagia, change in bowel habits, melena, hematochezia.    Prior imaging/procedures:  CT A/P with IV contrast 3/25/2025: No acute abnormalities  Colonoscopy 8/2017: Mild diverticulosis, internal hemorrhoids    Review of Systems   Constitutional:  Negative for appetite change, chills and fever.   Respiratory:  Negative for shortness of breath.    Gastrointestinal:  Positive for abdominal pain. Negative for blood in stool, constipation, diarrhea, nausea and vomiting.     Medical History Reviewed by provider this encounter:     .  Current Outpatient Medications on File Prior to Visit   Medication Sig Dispense Refill    amLODIPine (NORVASC) 5 mg tablet TAKE 1 TABLET (5 MG TOTAL) BY MOUTH DAILY. 90 tablet 1    b complex vitamins capsule Take 1 capsule by mouth daily      Blood Glucose Monitoring Suppl (OneTouch Verio Reflect) w/Device KIT Check blood sugars once daily. Please substitute with appropriate alternative as covered by patient's insurance. Dx: E11.65 1 kit 0    carvedilol (COREG) 6.25 mg tablet TAKE 1 TABLET BY MOUTH TWICE A  tablet 1    ergocalciferol (VITAMIN D2) 50,000 units Take 1 capsule (50,000 Units total) by mouth once a week 12 capsule 2    ezetimibe (ZETIA) 10 mg tablet TAKE 1 TABLET BY MOUTH EVERY DAY 90 tablet 1    glucose blood (OneTouch Verio) test strip Check blood sugars once daily. Please substitute with appropriate alternative as covered by patient's insurance. Dx: E11.65 100 each 3    hydroCHLOROthiazide 25 mg tablet TAKE 1 TABLET (25 MG TOTAL) BY MOUTH DAILY. 90 tablet 1    levothyroxine 125 mcg tablet Take 1 tablet (125 mcg total) by mouth daily 90 tablet 1    metFORMIN (GLUCOPHAGE-XR) 500 mg 24 hr tablet Take 4 tablets (2,000 mg total) by mouth daily with breakfast 360 tablet 1    Multiple Vitamin (multivitamin) tablet Take 1 tablet by mouth daily      omeprazole (PriLOSEC) 20 mg delayed release capsule Take 1 capsule (20 mg total) by  "mouth daily 30 capsule 0    OneTouch Delica Lancets 33G MISC Check blood sugars once daily. Please substitute with appropriate alternative as covered by patient's insurance. Dx: E11.65 100 each 3    semaglutide, 0.25 or 0.5 mg/dose, (Ozempic, 0.25 or 0.5 MG/DOSE,) 2 mg/3 mL injection pen 0.25 mg under the skin every 7 days for 4 doses (28 days), THEN 0.5 mg under the skin every 7 days 9 mL 0     No current facility-administered medications on file prior to visit.      Social History     Tobacco Use    Smoking status: Never     Passive exposure: Never    Smokeless tobacco: Never   Vaping Use    Vaping status: Never Used   Substance and Sexual Activity    Alcohol use: Yes     Comment: rarely    Drug use: Never    Sexual activity: Yes     Partners: Male        Objective   /72 (BP Location: Right arm, Patient Position: Sitting, Cuff Size: Large)   Temp 98.6 °F (37 °C) (Tympanic)   Ht 5' 5\" (1.651 m)   Wt 105 kg (232 lb)   BMI 38.61 kg/m²      Physical Exam  Vitals reviewed.   Constitutional:       General: She is not in acute distress.     Appearance: She is not ill-appearing.   HENT:      Head: Normocephalic and atraumatic.   Cardiovascular:      Rate and Rhythm: Normal rate and regular rhythm.   Pulmonary:      Effort: Pulmonary effort is normal.      Breath sounds: Normal breath sounds.   Abdominal:      General: Abdomen is flat. Bowel sounds are normal. There is no distension.      Palpations: Abdomen is soft.      Tenderness: There is no abdominal tenderness.   Skin:     General: Skin is warm and dry.   Neurological:      Mental Status: She is alert. Mental status is at baseline.         "

## 2025-04-09 ENCOUNTER — ANESTHESIA EVENT (OUTPATIENT)
Dept: PERIOP | Facility: HOSPITAL | Age: 70
End: 2025-04-09
Payer: COMMERCIAL

## 2025-04-09 ENCOUNTER — ANESTHESIA (OUTPATIENT)
Dept: PERIOP | Facility: HOSPITAL | Age: 70
End: 2025-04-09
Payer: COMMERCIAL

## 2025-04-09 ENCOUNTER — HOSPITAL ENCOUNTER (OUTPATIENT)
Dept: PERIOP | Facility: HOSPITAL | Age: 70
Setting detail: OUTPATIENT SURGERY
Discharge: HOME | End: 2025-04-09
Payer: COMMERCIAL

## 2025-04-09 VITALS
WEIGHT: 231 LBS | TEMPERATURE: 97.3 F | SYSTOLIC BLOOD PRESSURE: 123 MMHG | DIASTOLIC BLOOD PRESSURE: 82 MMHG | HEIGHT: 65 IN | HEART RATE: 68 BPM | RESPIRATION RATE: 22 BRPM | BODY MASS INDEX: 38.49 KG/M2 | OXYGEN SATURATION: 95 %

## 2025-04-09 DIAGNOSIS — R10.13 EPIGASTRIC PAIN: ICD-10-CM

## 2025-04-09 DIAGNOSIS — K21.9 GASTROESOPHAGEAL REFLUX DISEASE, UNSPECIFIED WHETHER ESOPHAGITIS PRESENT: ICD-10-CM

## 2025-04-09 LAB — GLUCOSE SERPL-MCNC: 153 MG/DL (ref 65–140)

## 2025-04-09 PROCEDURE — 82948 REAGENT STRIP/BLOOD GLUCOSE: CPT

## 2025-04-09 PROCEDURE — 88305 TISSUE EXAM BY PATHOLOGIST: CPT | Performed by: PATHOLOGY

## 2025-04-09 PROCEDURE — 43239 EGD BIOPSY SINGLE/MULTIPLE: CPT | Performed by: STUDENT IN AN ORGANIZED HEALTH CARE EDUCATION/TRAINING PROGRAM

## 2025-04-09 PROCEDURE — 43251 EGD REMOVE LESION SNARE: CPT | Performed by: STUDENT IN AN ORGANIZED HEALTH CARE EDUCATION/TRAINING PROGRAM

## 2025-04-09 RX ORDER — SODIUM CHLORIDE, SODIUM LACTATE, POTASSIUM CHLORIDE, CALCIUM CHLORIDE 600; 310; 30; 20 MG/100ML; MG/100ML; MG/100ML; MG/100ML
INJECTION, SOLUTION INTRAVENOUS CONTINUOUS PRN
Status: DISCONTINUED | OUTPATIENT
Start: 2025-04-09 | End: 2025-04-09

## 2025-04-09 RX ORDER — SODIUM CHLORIDE, SODIUM LACTATE, POTASSIUM CHLORIDE, CALCIUM CHLORIDE 600; 310; 30; 20 MG/100ML; MG/100ML; MG/100ML; MG/100ML
125 INJECTION, SOLUTION INTRAVENOUS CONTINUOUS
Status: DISCONTINUED | OUTPATIENT
Start: 2025-04-09 | End: 2025-04-13 | Stop reason: HOSPADM

## 2025-04-09 RX ORDER — LIDOCAINE HYDROCHLORIDE 20 MG/ML
INJECTION, SOLUTION EPIDURAL; INFILTRATION; INTRACAUDAL; PERINEURAL AS NEEDED
Status: DISCONTINUED | OUTPATIENT
Start: 2025-04-09 | End: 2025-04-09

## 2025-04-09 RX ORDER — PROPOFOL 10 MG/ML
INJECTION, EMULSION INTRAVENOUS AS NEEDED
Status: DISCONTINUED | OUTPATIENT
Start: 2025-04-09 | End: 2025-04-09

## 2025-04-09 RX ADMIN — SODIUM CHLORIDE, SODIUM LACTATE, POTASSIUM CHLORIDE, AND CALCIUM CHLORIDE 125 ML/HR: .6; .31; .03; .02 INJECTION, SOLUTION INTRAVENOUS at 10:26

## 2025-04-09 RX ADMIN — PROPOFOL 30 MG: 10 INJECTION, EMULSION INTRAVENOUS at 11:24

## 2025-04-09 RX ADMIN — PROPOFOL 50 MG: 10 INJECTION, EMULSION INTRAVENOUS at 11:26

## 2025-04-09 RX ADMIN — PROPOFOL 150 MG: 10 INJECTION, EMULSION INTRAVENOUS at 11:22

## 2025-04-09 RX ADMIN — LIDOCAINE HYDROCHLORIDE 100 MG: 20 INJECTION, SOLUTION EPIDURAL; INFILTRATION; INTRACAUDAL; PERINEURAL at 11:22

## 2025-04-09 RX ADMIN — SODIUM CHLORIDE, SODIUM LACTATE, POTASSIUM CHLORIDE, AND CALCIUM CHLORIDE: .6; .31; .03; .02 INJECTION, SOLUTION INTRAVENOUS at 11:19

## 2025-04-09 NOTE — ANESTHESIA POSTPROCEDURE EVALUATION
Post-Op Assessment Note    CV Status:  Stable  Pain Score: 0    Pain management: satisfactory to patient       Mental Status:  Alert and awake   Hydration Status:  Euvolemic   PONV Controlled:  Controlled   Airway Patency:  Patent     Post Op Vitals Reviewed: Yes    No anethesia notable event occurred.    Staff: CRNA       Last Filed PACU Vitals:  Vitals Value Taken Time   Temp 97 °F (36.1 °C) 04/09/25 1135   Pulse 71 04/09/25 1141   /58 04/09/25 1140   Resp 18 04/09/25 1141   SpO2 94 % 04/09/25 1141   Vitals shown include unfiled device data.    Modified Enriqueta:     Vitals Value Taken Time   Activity 0 04/09/25 1135   Respiration 2 04/09/25 1135   Circulation 2 04/09/25 1135   Consciousness 0 04/09/25 1135   Oxygen Saturation 2 04/09/25 1135     Modified Enriqueta Score: 6

## 2025-04-09 NOTE — ANESTHESIA PREPROCEDURE EVALUATION
Medical History    History Comments   Hypertension    Type 2 diabetes mellitus (HCC)    Morbid obesity (HCC)    Hypothyroidism    Hyperlipidemia    Vitamin D deficiency    Sleep apnea no cpap machine used   Pancreatitis    Anesthesia complication Facial swelling   Anemia    Diabetes mellitus (HCC)    Disease of thyroid gland    Wears glasses    Procedure:  EGD    Relevant Problems   ANESTHESIA (within normal limits)      CARDIO   (+) HTN (hypertension)   (+) Hyperlipidemia      ENDO   (+) Hypothyroidism   (+) Type 2 diabetes mellitus with obesity  (HCC)      HEMATOLOGY   (+) Iron deficiency anemia      PULMONARY   (+) Sleep apnea        Physical Exam    Airway    Mallampati score: II  TM Distance: >3 FB  Neck ROM: full     Dental       Cardiovascular  Rate: normal    Pulmonary  Pulmonary exam normal     Other Findings  Per pt denies anything remaining that is loose or removeable  post-pubertal.      Anesthesia Plan  ASA Score- 2     Anesthesia Type- IV sedation with anesthesia with ASA Monitors.         Additional Monitors:     Airway Plan:            Plan Factors-Exercise tolerance (METS): >4 METS.    Chart reviewed.    Patient summary reviewed.    Patient is not a current smoker.              Induction- intravenous.    Postoperative Plan-         Informed Consent- Anesthetic plan and risks discussed with patient.  I personally reviewed this patient with the CRNA. Discussed and agreed on the Anesthesia Plan with the CRNA..      NPO Status:  Vitals Value Taken Time   Date of last liquid 04/09/25 04/09/25 1020   Time of last liquid 0700 04/09/25 1020   Date of last solid 04/08/25 04/09/25 1020   Time of last solid 2100 04/09/25 1020

## 2025-04-09 NOTE — INTERVAL H&P NOTE
H&P reviewed. After examining the patient I find no changes in the patients condition since the H&P had been written.    Vitals:    04/09/25 1021   BP: 127/69   Pulse: 76   Resp: 18   Temp: 98.6 °F (37 °C)   SpO2: 98%

## 2025-04-15 NOTE — TELEPHONE ENCOUNTER
Report from Sending RN:    Report From: Starr  Recent Surgery of Procedure: No  Baseline Level of Consciousness (LOC): A&Ox2 confused  Oxygen Use: No  Type: RA  Diabetic: Yes  Last BP Med Given Day of Dialysis: see MAr  Last Pain Med Given: see MAR   Lab Tests to be Obtained with Dialysis: No  Blood Transfusion to be Given During Dialysis: No  Available IV Access: Yes  Medications to be Administered During Dialysis: No  Continuous IV Infusion Running: No  Restraints on Currently or in the Last 24 Hours: No  Hand-Off Communication: DNR no intubation   Dialysis Catheter Dressing: clean dry intact   Last Dressing Change: 4/12/2025   Spoke with patient, confirmed dates & times OK

## 2025-04-17 PROCEDURE — 88305 TISSUE EXAM BY PATHOLOGIST: CPT | Performed by: PATHOLOGY

## 2025-04-20 DIAGNOSIS — E11.69 TYPE 2 DIABETES MELLITUS WITH OBESITY  (HCC): ICD-10-CM

## 2025-04-20 DIAGNOSIS — E66.9 TYPE 2 DIABETES MELLITUS WITH OBESITY  (HCC): ICD-10-CM

## 2025-04-20 DIAGNOSIS — I10 ESSENTIAL HYPERTENSION: ICD-10-CM

## 2025-04-21 RX ORDER — CARVEDILOL 6.25 MG/1
6.25 TABLET ORAL 2 TIMES DAILY
Qty: 180 TABLET | Refills: 1 | Status: SHIPPED | OUTPATIENT
Start: 2025-04-21

## 2025-04-21 RX ORDER — METFORMIN HYDROCHLORIDE 500 MG/1
TABLET, EXTENDED RELEASE ORAL
Qty: 360 TABLET | Refills: 1 | Status: SHIPPED | OUTPATIENT
Start: 2025-04-21

## 2025-04-22 ENCOUNTER — RESULTS FOLLOW-UP (OUTPATIENT)
Dept: GASTROENTEROLOGY | Facility: MEDICAL CENTER | Age: 70
End: 2025-04-22

## 2025-04-30 NOTE — TELEPHONE ENCOUNTER
----- Message from Gael Ferguson MD sent at 4/30/2025 10:52 AM EDT -----  Patient has not read the result message I sent via [facility] 1 week ago. I will ask GI staff to call to update the patient. Recommend reading the [facility] message to the patient - not the result note.

## 2025-04-30 NOTE — TELEPHONE ENCOUNTER
Left message about Dr. Ferguson sending her a message in my-chart and read the message on machine.

## 2025-05-06 ENCOUNTER — APPOINTMENT (OUTPATIENT)
Dept: LAB | Facility: HOSPITAL | Age: 70
End: 2025-05-06
Payer: COMMERCIAL

## 2025-05-06 ENCOUNTER — RESULTS FOLLOW-UP (OUTPATIENT)
Dept: GASTROENTEROLOGY | Facility: CLINIC | Age: 70
End: 2025-05-06

## 2025-05-06 ENCOUNTER — TELEPHONE (OUTPATIENT)
Age: 70
End: 2025-05-06

## 2025-05-06 DIAGNOSIS — E66.9 TYPE 2 DIABETES MELLITUS WITH OBESITY  (HCC): ICD-10-CM

## 2025-05-06 DIAGNOSIS — R79.89 ELEVATED LFTS: ICD-10-CM

## 2025-05-06 DIAGNOSIS — E66.01 MORBID OBESITY (HCC): Primary | ICD-10-CM

## 2025-05-06 DIAGNOSIS — E11.69 TYPE 2 DIABETES MELLITUS WITH OBESITY  (HCC): ICD-10-CM

## 2025-05-06 LAB
ALBUMIN SERPL BCG-MCNC: 4.3 G/DL (ref 3.5–5)
ALP SERPL-CCNC: 74 U/L (ref 34–104)
ALT SERPL W P-5'-P-CCNC: 20 U/L (ref 7–52)
AST SERPL W P-5'-P-CCNC: 16 U/L (ref 13–39)
BILIRUB DIRECT SERPL-MCNC: 0.36 MG/DL (ref 0–0.2)
BILIRUB SERPL-MCNC: 1.66 MG/DL (ref 0.2–1)
PROT SERPL-MCNC: 7.4 G/DL (ref 6.4–8.4)

## 2025-05-06 PROCEDURE — 36415 COLL VENOUS BLD VENIPUNCTURE: CPT

## 2025-05-06 PROCEDURE — 80076 HEPATIC FUNCTION PANEL: CPT

## 2025-05-06 NOTE — TELEPHONE ENCOUNTER
Patient called with update regarding Ozempic.  Patient states she is doing well, and experiencing NO ill effects.    Patient is currently taking 0.5 mg and has two remaining doses.  She would like to know when dosage would be increased, and is an appointment required.    Please call back with instructions.

## 2025-05-12 ENCOUNTER — TELEPHONE (OUTPATIENT)
Dept: GASTROENTEROLOGY | Facility: CLINIC | Age: 70
End: 2025-05-12

## 2025-05-12 NOTE — TELEPHONE ENCOUNTER
I left a voice message to inform patient of the results  below and provided the office number with any questions or concerns, Thank you         your repeat labs show your liver tests are normal. This is great news

## 2025-05-27 ENCOUNTER — OFFICE VISIT (OUTPATIENT)
Dept: GASTROENTEROLOGY | Facility: CLINIC | Age: 70
End: 2025-05-27
Payer: COMMERCIAL

## 2025-05-27 VITALS
OXYGEN SATURATION: 96 % | HEART RATE: 78 BPM | WEIGHT: 224 LBS | TEMPERATURE: 97.6 F | HEIGHT: 65 IN | BODY MASS INDEX: 37.32 KG/M2 | SYSTOLIC BLOOD PRESSURE: 113 MMHG | DIASTOLIC BLOOD PRESSURE: 72 MMHG

## 2025-05-27 DIAGNOSIS — K76.0 HEPATIC STEATOSIS: ICD-10-CM

## 2025-05-27 DIAGNOSIS — Z98.84 HISTORY OF ROUX-EN-Y GASTRIC BYPASS: ICD-10-CM

## 2025-05-27 DIAGNOSIS — R17 ELEVATED BILIRUBIN: ICD-10-CM

## 2025-05-27 DIAGNOSIS — Z12.11 SCREENING FOR COLON CANCER: ICD-10-CM

## 2025-05-27 DIAGNOSIS — R10.13 EPIGASTRIC PAIN: Primary | ICD-10-CM

## 2025-05-27 PROCEDURE — 99214 OFFICE O/P EST MOD 30 MIN: CPT | Performed by: PHYSICIAN ASSISTANT

## 2025-05-27 RX ORDER — FAMOTIDINE 20 MG/1
20 TABLET, FILM COATED ORAL 2 TIMES DAILY PRN
Qty: 60 TABLET | Refills: 3 | Status: SHIPPED | OUTPATIENT
Start: 2025-05-27

## 2025-05-27 RX ORDER — OMEPRAZOLE 20 MG/1
20 CAPSULE, DELAYED RELEASE ORAL DAILY
Qty: 30 CAPSULE | Refills: 3 | Status: SHIPPED | OUTPATIENT
Start: 2025-05-27

## 2025-05-27 NOTE — PATIENT INSTRUCTIONS
Okay for as needed famotidine for symptom relief.   If needed, you can restart omeprazole daily.     High fiber diet, constipation prevention.

## 2025-05-27 NOTE — ASSESSMENT & PLAN NOTE
Hx of such, pt should follow with Bariatrics for nutritional monitoring.   Orders:    famotidine (PEPCID) 20 mg tablet; Take 1 tablet (20 mg total) by mouth 2 (two) times a day as needed for heartburn

## 2025-05-27 NOTE — PROGRESS NOTES
Name: Lizabeth Santiago      : 1955      MRN: [id number]  Encounter Provider: Aimee Nova PA-C  Encounter Date: 2025   Encounter department: [facility] [place]  Assessment & Plan  Epigastric pain  Pt who is s/p RYGB and cholecystectomy had acute onset of epi pain in 2025 had ER eval which was negative for ACS.   Started on PPI and had EGD completed which was macroscopically unremarkable.   Bx with some gastritis, negative for h pylori, intestinal metaplasia. Fundic gland polyps identified.   Symptoms have abated.     Perhaps underlying gastritis, PUD, functional dyspepsia, medication SE etc.     Pt would like to d/c PPI, I see no objection at this time.   Okay for PRN usage of H2RA for symptom relief, if needed can restart daily PPI.     Recommend diet and lifestyle modifications for GERD.  This includes avoiding spicy, saucy, greasy/oily foods, citrus, EtOH, NSAIDs, tobacco.  Avoid eating within 2 to 3 hours of bed.  Elevating height of bed 6 inches on blocks may be beneficial.  Weight loss would likely be beneficial.  Orders:    famotidine (PEPCID) 20 mg tablet; Take 1 tablet (20 mg total) by mouth 2 (two) times a day as needed for heartburn    omeprazole (PriLOSEC) 20 mg delayed release capsule; Take 1 capsule (20 mg total) by mouth daily    History of Hima-en-Y gastric bypass  Hx of such, pt should follow with Bariatrics for nutritional monitoring.   Orders:    famotidine (PEPCID) 20 mg tablet; Take 1 tablet (20 mg total) by mouth 2 (two) times a day as needed for heartburn    Hepatic steatosis  Incidental finding on imaging from 2025.   Likely metabolic etiol.   Reviewed potential sequelae of disease including fibrosis and cirrhosis.   Low risk of fibrosis per FIB4 score.     Discussed recommendations in regards to fatty liver including:   Strict control of contributing comorbidities (obesity, prediabetes/diabetes, hypertension, and  hypertriglyceridemia).  Weight loss of approx 10-15% of patient's current body weight over a period of 6-12 months through low fat diet and cardiovascular exercise as tolerated; pt is on GLP-1 agonist.   Limiting alcohol consumption, preferably complete abstinence.  Monitor hepatic function every 6 months with routine labs.     Fibrosis-4 (FIB-4) Score is: .69    Indication for testing Absence of advanced fibrosis Presence of advanced fibrosis Indeterminate result   NAFLD <2.00 >2.67 2.00-2.67   Hepatitis C <1.45 >3.25 1.45-3.25   Hepatitis B <1.00 >2.65 1.00-2.65     FIB-4 Score Component Values:  Component Value Date   Age: 69 y.o.     AST: 16 U/L 5/6/2025   Platelet: 348 Thousands/uL 3/25/2025   ALT: 20 U/L 5/6/2025        Screening for colon cancer  Last colonoscopy in 2017, no hx of colon polyps or family hx of CRC.   Due for repeat in 2027 for cancer screening purposes.        Elevated bilirubin  Initially elevation in bili and AST/ALT, the latter of which have normalized.   Primarily indirect elevation, likely benign etiol, suspect something like Gilbert's disease.   No evidence of advanced liver disease.        We will follow up as needed for development of new GI symptoms.     History of Present Illness   Lizabeth Santiago is a 69 y.o. female who presents for f/u from EGD. Pmhx sig for HTN, HLD, CULLEN, DM2, hypothyroidism, WYATT, hx of RYGB, history of cholecystectomy.  HPI  History obtained from: patient    Pt was last evaluated in clinic in 03/2025. She had ER evaluation for epi pain, chest pain. ACS w/u was negative. She was started on PPI and underwent EGD in 04/2025 which was unremarkable. She had started on GLP agonist over past couple of months, thought to have perhaps been precipitant for symptoms.     05/27/25:     Pt shares she is feeling well at this time. No recurrence of epi pain. No nausea, emesis. No dysphagia or odynophagia. No early satiety. No abnormal weight loss over past 6 months.     Pt is  having formed brown stools daily. No BRBPR or melena. No nocturnal BM.     No family hx of CRC or other GI malignancies.     NSAIDs: none  Etoh: none     03/2025: WBC 10.1, hemoglobin 14.7, , ALT 46, alk phos 113, T. bili 1.16, lipase 145   03/2025: CT A/P: wnl    Endoscopic history:   EGD: 04/2025: Previous Hima-en-Y gastric bypass; Two polyps measuring from 7 mm up to 11 mm in the gastric pouch   A. Stomach, cold fcp bx r/o h pylori: Gastric oxyntic and antral type mucosa with minimal or mild chronic inflammation; No bessie shaped bacteria seen on H&E stained tissue section; Negative for intestinal metaplasia and dysplasia   B. Stomach, cold snare of gastric polyp x2: Fundic gland polyps  Colon: 08/2017: mild diverticulosis, internal hemorrhoids     Review of Systems A complete review of systems is negative other than that noted above in the HPI.    Past Medical History   Past Medical History[1]  Past Surgical History[2]  Family History[3]   reports that she has never smoked. She has never been exposed to tobacco smoke. She has never used smokeless tobacco. She reports current alcohol use. She reports that she does not use drugs.  Current Outpatient Medications   Medication Instructions    amLODIPine (NORVASC) 5 mg, Oral, Daily    b complex vitamins capsule 1 capsule, Daily    Blood Glucose Monitoring Suppl (OneTouch Verio Reflect) w/Device KIT Check blood sugars once daily. Please substitute with appropriate alternative as covered by patient's insurance. Dx: E11.65    carvedilol (COREG) 6.25 mg, Oral, 2 times daily    ergocalciferol (VITAMIN D2) 50,000 Units, Oral, Weekly    ezetimibe (ZETIA) 10 mg, Oral, Daily    glucose blood (OneTouch Verio) test strip Check blood sugars once daily. Please substitute with appropriate alternative as covered by patient's insurance. Dx: E11.65    hydroCHLOROthiazide 25 mg, Oral, Daily    levothyroxine 125 mcg, Oral, Daily    metFORMIN (GLUCOPHAGE-XR) 500 mg 24 hr tablet TAKE  "4 TABLETS BY MOUTH DAILY WITH BREAKFAST    Multiple Vitamin (multivitamin) tablet 1 tablet, Daily    omeprazole (PRILOSEC) 20 mg, Oral, Daily    OneTouch Delica Lancets 33G MISC Check blood sugars once daily. Please substitute with appropriate alternative as covered by patient's insurance. Dx: E11.65    semaglutide (1 mg/dose) (OZEMPIC) 1 mg, Subcutaneous, Weekly   Allergies[4]   Current Medications[5]  Objective   /72 (BP Location: Left arm, Patient Position: Sitting, Cuff Size: Standard)   Pulse 78   Temp 97.6 °F (36.4 °C) (Temporal)   Ht 5' 5\" (1.651 m)   Wt 102 kg (224 lb)   SpO2 96%   BMI 37.28 kg/m²     Physical Exam  Vitals and nursing note reviewed.   Constitutional:       General: She is not in acute distress.     Appearance: She is well-developed.   HENT:      Head: Normocephalic and atraumatic.     Eyes:      General: No scleral icterus.     Conjunctiva/sclera: Conjunctivae normal.       Cardiovascular:      Rate and Rhythm: Normal rate.   Pulmonary:      Effort: Pulmonary effort is normal. No respiratory distress.      Breath sounds: Normal breath sounds.   Abdominal:      General: There is no distension.      Palpations: Abdomen is soft.      Tenderness: There is no abdominal tenderness. There is no guarding or rebound.     Musculoskeletal:         General: No swelling.     Skin:     General: Skin is warm and dry.      Coloration: Skin is not jaundiced.     Neurological:      General: No focal deficit present.      Mental Status: She is alert.     Psychiatric:         Mood and Affect: Mood normal.          Lab Results: I personally reviewed relevant lab results. CBC/BMP: No new results in last 24 hours. , Creatinine Clearance: CrCl cannot be calculated (Patient's most recent lab result is older than the maximum 7 days allowed.)., LFTs: No new results in last 24 hours.     Radiology Results Review: I have reviewed the following images/report studies in PACS: No results found.   Results for " orders placed during the hospital encounter of 04/09/25    EGD    Impression  The esophagus appeared normal.  Previous Hima-en-Y gastric bypass  The gastric pouch appeared normal. Performed random biopsy to rule out H. pylori.  Two polyps measuring from 7 mm up to 11 mm in the gastric pouch; performed cold snare removal  The gastrojejunostomy appeared normal.  The hima limb appeared normal.      RECOMMENDATION:  Await pathology results            Gael Ferguson MD    **Please note:  Dictation voice to text software may have been used in the creation of this record.  Occasional wrong word or “sound alike” substitutions may have occurred due to the inherent limitations of voice recognition software.  Read the chart carefully and recognize, using context, where substitutions have occurred.**         [1]   Past Medical History:  Diagnosis Date    Anemia     Anesthesia complication     Facial swelling    Diabetes mellitus (HCC)     Disease of thyroid gland     Hyperlipidemia     Hypertension     Hypothyroidism     Morbid obesity (HCC)     Pancreatitis 09/1997    Sleep apnea     no cpap machine used    Type 2 diabetes mellitus (HCC)     Vitamin D deficiency     Wears glasses    [2]   Past Surgical History:  Procedure Laterality Date    BREAST EXCISIONAL BIOPSY Left 07/2001    benign    CATARACT EXTRACTION Left 06/2024    CATARACT EXTRACTION Right 07/2024    CHOLECYSTECTOMY  04/2013    COLONOSCOPY      EGD      GASTRIC BYPASS  05/2013    JOINT REPLACEMENT      AZ EXC EXCSV SKN ABD INFRAUMBILICAL PANNICULECTOMY N/A 02/15/2023    Procedure: BODY-LIFT;  Surgeon: Riley Celeste MD;  Location:  MAIN OR;  Service: Plastics    AZ EXCISION EXCESSIVE SKIN & SUBQ TISSUE ARM Bilateral 09/26/2023    Procedure: BRACHIOPLASTY;  Surgeon: Riley Celeste MD;  Location:  MAIN OR;  Service: Plastics    AZ SUCTION ASSISTED LIPECTOMY LOWER EXTREMITY Bilateral 09/26/2023    Procedure: LIPOSUCTION OF LATERAL & MEDIAL THIGHS;  Surgeon:  Riley Celeste MD;  Location:  MAIN OR;  Service: Plastics    TONSILLECTOMY      TOTAL KNEE ARTHROPLASTY Left 2013   [3]   Family History  Problem Relation Name Age of Onset    Breast cancer Mother      Lung cancer Mother      Brain cancer Mother      Heart attack Father      Stroke Sister joão     No Known Problems Sister      No Known Problems Sister      No Known Problems Sister      No Known Problems Sister      No Known Problems Daughter selene     No Known Problems Maternal Grandmother      No Known Problems Maternal Grandfather      Cancer Paternal Grandmother          unknown origin     Cancer Paternal Grandfather          unknown origin     Breast cancer Maternal Aunt yuri     Breast cancer Maternal Aunt kori     Cancer Paternal Aunt kori         unknown origin    Cancer Paternal Aunt sandie         unknown origin    Cancer Paternal Aunt irene         unknown origin    Lung cancer Paternal Aunt sherine    [4]   Allergies  Allergen Reactions    Aspirin Angioedema    Codeine Angioedema    Nsaids Angioedema    Statins Other (See Comments) and Angioedema     Patient states allergic to statins    Wound Dressing Adhesive Rash   [5]   Current Outpatient Medications   Medication Sig Dispense Refill    amLODIPine (NORVASC) 5 mg tablet TAKE 1 TABLET (5 MG TOTAL) BY MOUTH DAILY. 90 tablet 1    b complex vitamins capsule Take 1 capsule by mouth in the morning.      Blood Glucose Monitoring Suppl (OneTouch Verio Reflect) w/Device KIT Check blood sugars once daily. Please substitute with appropriate alternative as covered by patient's insurance. Dx: E11.65 1 kit 0    carvedilol (COREG) 6.25 mg tablet TAKE 1 TABLET BY MOUTH TWICE A  tablet 1    ergocalciferol (VITAMIN D2) 50,000 units Take 1 capsule (50,000 Units total) by mouth once a week 12 capsule 2    ezetimibe (ZETIA) 10 mg tablet TAKE 1 TABLET BY MOUTH EVERY DAY 90 tablet 1    glucose blood (OneTouch Verio) test strip Check blood sugars once daily.  Please substitute with appropriate alternative as covered by patient's insurance. Dx: E11.65 100 each 3    hydroCHLOROthiazide 25 mg tablet TAKE 1 TABLET (25 MG TOTAL) BY MOUTH DAILY. 90 tablet 1    levothyroxine 125 mcg tablet Take 1 tablet (125 mcg total) by mouth daily 90 tablet 1    metFORMIN (GLUCOPHAGE-XR) 500 mg 24 hr tablet TAKE 4 TABLETS BY MOUTH DAILY WITH BREAKFAST 360 tablet 1    Multiple Vitamin (multivitamin) tablet Take 1 tablet by mouth in the morning.      OneTouch Delica Lancets 33G MISC Check blood sugars once daily. Please substitute with appropriate alternative as covered by patient's insurance. Dx: E11.65 100 each 3    semaglutide, 1 mg/dose, (Ozempic) 4 mg/3 mL injection pen Inject 0.75 mL (1 mg total) under the skin once a week 9 mL 1    omeprazole (PriLOSEC) 20 mg delayed release capsule Take 1 capsule (20 mg total) by mouth daily 30 capsule 0     No current facility-administered medications for this visit.

## 2025-06-09 DIAGNOSIS — E03.9 ACQUIRED HYPOTHYROIDISM: ICD-10-CM

## 2025-06-09 RX ORDER — LEVOTHYROXINE SODIUM 125 UG/1
125 TABLET ORAL DAILY
Qty: 90 TABLET | Refills: 1 | Status: SHIPPED | OUTPATIENT
Start: 2025-06-09

## 2025-06-19 DIAGNOSIS — Z98.84 HISTORY OF ROUX-EN-Y GASTRIC BYPASS: ICD-10-CM

## 2025-06-19 DIAGNOSIS — R10.13 EPIGASTRIC PAIN: ICD-10-CM

## 2025-06-20 RX ORDER — OMEPRAZOLE 20 MG/1
20 CAPSULE, DELAYED RELEASE ORAL DAILY
Qty: 90 CAPSULE | Refills: 1 | Status: SHIPPED | OUTPATIENT
Start: 2025-06-20

## 2025-06-20 RX ORDER — FAMOTIDINE 20 MG/1
TABLET, FILM COATED ORAL
Qty: 180 TABLET | Refills: 1 | Status: SHIPPED | OUTPATIENT
Start: 2025-06-20

## 2025-07-09 NOTE — ANESTHESIA POSTPROCEDURE EVALUATION
Post-Op Assessment Note    CV Status:  Stable  Pain Score: 0    Pain management: adequate     Mental Status:  Alert and awake   Hydration Status:  Stable   PONV Controlled:  None   Airway Patency:  Patent      Post Op Vitals Reviewed: Yes      Staff: CRNA         No notable events documented.     /76 (09/26/23 1308)    Temp (!) 97.4 °F (36.3 °C) (09/26/23 1308)    Pulse 65 (09/26/23 1308)   Resp 16 (09/26/23 1308)    SpO2 99 % (09/26/23 1308)
No

## 2025-08-14 ENCOUNTER — VBI (OUTPATIENT)
Dept: ADMINISTRATIVE | Facility: OTHER | Age: 70
End: 2025-08-14

## 2025-08-22 DIAGNOSIS — E78.5 DYSLIPIDEMIA: ICD-10-CM

## 2025-08-22 DIAGNOSIS — I10 ESSENTIAL HYPERTENSION: ICD-10-CM

## 2025-08-22 RX ORDER — HYDROCHLOROTHIAZIDE 25 MG/1
25 TABLET ORAL DAILY
Qty: 90 TABLET | Refills: 1 | Status: SHIPPED | OUTPATIENT
Start: 2025-08-22

## 2025-08-22 RX ORDER — AMLODIPINE BESYLATE 5 MG/1
5 TABLET ORAL DAILY
Qty: 90 TABLET | Refills: 1 | Status: SHIPPED | OUTPATIENT
Start: 2025-08-22

## 2025-08-22 RX ORDER — EZETIMIBE 10 MG/1
10 TABLET ORAL DAILY
Qty: 90 TABLET | Refills: 1 | Status: SHIPPED | OUTPATIENT
Start: 2025-08-22

## (undated) DEVICE — SUT VICRYL 2-0 J459H

## (undated) DEVICE — DRAPE SHEET THREE QUARTER

## (undated) DEVICE — INTENDED FOR TISSUE SEPARATION, AND OTHER PROCEDURES THAT REQUIRE A SHARP SURGICAL BLADE TO PUNCTURE OR CUT.: Brand: BARD-PARKER SAFETY BLADES SIZE 11, STERILE

## (undated) DEVICE — STERILE POLYISOPRENE POWDER-FREE SURGICAL GLOVES: Brand: PROTEXIS

## (undated) DEVICE — DECANTER: Brand: UNBRANDED

## (undated) DEVICE — TUBING INFILTRATION 9FT

## (undated) DEVICE — NEEDLE 25G X 1 1/2

## (undated) DEVICE — 1820 FOAM BLOCK NEEDLE COUNTER: Brand: DEVON

## (undated) DEVICE — TRAY FOLEY 16FR URIMETER SURESTEP

## (undated) DEVICE — PACK UNIVERSAL DRAPES SUB-Q ICD

## (undated) DEVICE — BASIC SINGLE BASIN-LF: Brand: MEDLINE INDUSTRIES, INC.

## (undated) DEVICE — JACKSON-PRATT 100CC BULB RESERVOIR: Brand: CARDINAL HEALTH

## (undated) DEVICE — CANNISTER WASTE IMPLOSION PROOF

## (undated) DEVICE — GAUZE SPONGES,16 PLY: Brand: CURITY

## (undated) DEVICE — DRAPE TOWEL: Brand: CONVERTORS

## (undated) DEVICE — SYRINGE 30ML LL

## (undated) DEVICE — TUBING SUCTION 5MM X 12 FT

## (undated) DEVICE — INTENDED FOR TISSUE SEPARATION, AND OTHER PROCEDURES THAT REQUIRE A SHARP SURGICAL BLADE TO PUNCTURE OR CUT.: Brand: BARD-PARKER ® SAFETYLOCK CARBON RIB-BACK BLADES

## (undated) DEVICE — SYRINGE 10ML LL CONTROL TOP

## (undated) DEVICE — SUT ETHILON 5-0 PS-2 18 IN 1666H

## (undated) DEVICE — SINGLE PORT MANIFOLD: Brand: NEPTUNE 2

## (undated) DEVICE — NEEDLE BLUNT 18 G X 1 1/2IN

## (undated) DEVICE — STOCKINETTE 2P PREROLLD 4X48

## (undated) DEVICE — WET SKIN PREP TRAY: Brand: MEDLINE INDUSTRIES, INC.

## (undated) DEVICE — SPONGE STICK WITH PVP-I: Brand: KENDALL

## (undated) DEVICE — STANDARD SURGICAL GOWN, L: Brand: CONVERTORS

## (undated) DEVICE — DISPOSABLE OR TOWEL: Brand: CARDINAL HEALTH

## (undated) DEVICE — TUBING ASPIRATION LIPOSUCTION SET 12FT

## (undated) DEVICE — ELECTRODE EZ CLEAN BLADE -0012

## (undated) DEVICE — PREMIUM DRY TRAY LF: Brand: MEDLINE INDUSTRIES, INC.

## (undated) DEVICE — 3M™ STERI-STRIP™ REINFORCED ADHESIVE SKIN CLOSURES, R1547, 1/2 IN X 4 IN (12 MM X 100 MM), 6 STRIPS/ENVELOPE: Brand: 3M™ STERI-STRIP™

## (undated) DEVICE — NEPTUNE E-SEP SMOKE EVACUATION PENCIL, COATED, 70MM BLADE, PUSH BUTTON SWITCH: Brand: NEPTUNE E-SEP

## (undated) DEVICE — CRADLE EXTREMITY UNIVERSAL CONTOURED

## (undated) DEVICE — NEEDLE FILTER 5 MICR 19G X 1.5IN

## (undated) DEVICE — SCD SEQUENTIAL COMPRESSION COMFORT SLEEVE MEDIUM KNEE LENGTH: Brand: KENDALL SCD

## (undated) DEVICE — TAPE TRANSPORE CLEAR 2 IN

## (undated) DEVICE — SUT PROLENE 3-0 PC-5 18 IN 8632G

## (undated) DEVICE — ACE WRAP 6 IN UNSTERILE

## (undated) DEVICE — SKIN MARKER DUAL TIP WITH RULER CAP, FLEXIBLE RULER AND LABELS: Brand: DEVON

## (undated) DEVICE — SPONGE LAP 18 X 18 IN STRL RFD

## (undated) DEVICE — STOCKINETTE 2P PREROLLD 6X60

## (undated) DEVICE — CHEST/BREAST DRAPE: Brand: CONVERTORS

## (undated) DEVICE — EMERALD TOP SHEET DRAPE: Brand: CONVERTORS

## (undated) DEVICE — COTTON TIP APPLICTOR 2 PK

## (undated) DEVICE — KERLIX BANDAGE ROLL: Brand: KERLIX

## (undated) DEVICE — SUT ETHIBOND 0 CT-2 30 IN X412H

## (undated) DEVICE — LIGHT GLOVE GREEN

## (undated) DEVICE — OCCLUSIVE GAUZE STRIP,3% BISMUTH TRIBROMOPHENATE IN PETROLATUM BLEND: Brand: XEROFORM

## (undated) DEVICE — SUT VICRYL 4-0 PS-2 18 IN J496G

## (undated) DEVICE — TIBURON SPLIT SHEET: Brand: CONVERTORS

## (undated) DEVICE — DRESSING XEROFORM 5 X 9

## (undated) DEVICE — PENCIL SMOKE EVAC TELESCOPING W/TUBING

## (undated) DEVICE — CANNULA 4MM TRI-PORT III 22CM 10 MM PORT

## (undated) DEVICE — ACE WRAP 4 IN UNSTERILE

## (undated) DEVICE — SYRINGE 3ML LL

## (undated) DEVICE — CANNULA 4MM DBL MERCEDES SINGLE USE 30CM 10MM PORT

## (undated) DEVICE — ABDOMINAL PAD: Brand: DERMACEA

## (undated) DEVICE — SUT ETHILON 3-0 PS-1 18 IN 1663H

## (undated) DEVICE — DRAIN CHANNEL RND FULL FLUTED 19FR W/TROCAR

## (undated) DEVICE — FILTER ABSOLUTE BIOFILTER 0.2MICRON F/LIPOSUCTION

## (undated) DEVICE — SUT ETHILON 3-0 PS-1 18 IN 1663G